# Patient Record
Sex: FEMALE | Race: WHITE | NOT HISPANIC OR LATINO | Employment: UNEMPLOYED | URBAN - METROPOLITAN AREA
[De-identification: names, ages, dates, MRNs, and addresses within clinical notes are randomized per-mention and may not be internally consistent; named-entity substitution may affect disease eponyms.]

---

## 2019-04-04 DIAGNOSIS — M65.841 STENOSING TENOSYNOVITIS OF FINGER OF RIGHT HAND: Primary | ICD-10-CM

## 2019-04-16 ENCOUNTER — CLINICAL SUPPORT (OUTPATIENT)
Dept: REHABILITATION | Facility: HOSPITAL | Age: 39
End: 2019-04-16
Attending: ORTHOPAEDIC SURGERY
Payer: MEDICAID

## 2019-04-16 DIAGNOSIS — M79.89 PAIN AND SWELLING OF RIGHT FOREARM: ICD-10-CM

## 2019-04-16 DIAGNOSIS — M79.621 PAIN IN RIGHT UPPER ARM: ICD-10-CM

## 2019-04-16 DIAGNOSIS — M79.641 PAIN IN RIGHT HAND: ICD-10-CM

## 2019-04-16 DIAGNOSIS — M79.631 PAIN AND SWELLING OF RIGHT FOREARM: ICD-10-CM

## 2019-04-16 PROCEDURE — 97166 OT EVAL MOD COMPLEX 45 MIN: CPT | Mod: PO

## 2019-04-16 NOTE — PLAN OF CARE
[facility] Occupational Therapy  Initial Evaluation     Date: 4/16/2019  Name: Geetha Meyers  Clinic Number: [id number]    Therapy Diagnosis:   Encounter Diagnoses   Name Primary?    Pain in right hand     Pain and swelling of right forearm     Pain in right upper arm      Physician: Triston Pitt III*    Physician Orders: Bilateral wrists ROM, heat, US, multimodality therapies  Medical Diagnosis: M65.841 (ICD-10-CM) - Stenosing tenosynovitis of finger of right hand  Surgical Procedure and Date: N/a  Evaluation Date: 4/16/2019  Insurance Authorization Period Expiration: 4/3/20  Plan of Care Certification Period: 5/10/19  Date of Return to MD: ?    Visit # / Visits authorized: 1/ 1   Time In:2:00PM  Time Out: 3:00PM  Total Billable Time: 60 minutes      Precautions:  Standard    Subjective     Involved Side: RUE  Dominant Side: Right  Date of Onset: A few months ago  History of Current Condition/Mechanism of Injury: Pt reports she was brushing her hair several weeks ago and felt a sharp pain in her wrist and heard a pop. She states the pain shot up her arm almost to the shoulder. She reports she has a history of CTS that is treated conservatively with wrist braces but that she still has occasional numbness, burning, and tingling in her fingers. She wears isotoner edema gloves as needed. She reports she was recently diagnosed with fibromyalgia and is concerned that is part of the issue. She reports recent onset of locking of the index finger of her R hand. She is scheduled for a nerve conduction study later this week.  Imaging: not available  Previous Therapy: No    Past Medical History/Physical Systems Review:   Geetha Meyers  has no past medical history on file.    Geetha Meyers  has no past surgical history on file.    Geetha currently has no medications in their medication list.    Review of patient's allergies indicates:  Allergies not on file     Patient's Goals for Therapy: To be able to  brush her daughter's hair again and get her symptoms to a manageable point.    Pain:  Functional Pain Scale Rating 0-10:   5/10 on average  Location: ulnar side of hand, proximal dorsal forearm, shooting up to upper arm  Description: burning, sometimes dull and achy    Occupation:  Business owner of staffing/security company  Duties: computer work, writing, typing    Functional Limitations/Social History:    Previous functional status includes: Independent with all ADLs.     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 PIP joints of bilateral fingers slip into hyperextension with mild resistance against finger extension.  Palpation: Tenderness over muscle bellies of ECRB and EDC muscles. No tenderness over 2nd A1 pulley.    AROM of bilateral hands and wrists WNL    Treatment     Treatment Time In: 2:40PM  Treatment Time Out: 3:00PM  Total Treatment time separate from Evaluation time:20 minutes    Geetha received the following supervised modalities after being cleared for contradictions for 10 minutes:   -Patient received paraffin bath with MHP to increase blood flow, circulation, pain management and for tissue elasticity prior to therex.     Geetha received therapeutic exercises for 10 minutes including:  -Initial HEP instruction and demonstration    Home Exercise Program/Education:  Issued HEP (see patient instructions in EMR) and educated on modality use for pain management . Exercises were reviewed and Geetha was able to demonstrate them prior to the end of the session.   Pt received a written copy of exercises to perform at home. Geetha demonstrated good  understanding of the education  provided.  Pt was advised to perform these exercises free of pain, and to stop performing them if pain occurs.    Patient/Family Education: role of OT, goals for OT, scheduling/cancellations - pt verbalized understanding. Discussed insurance limitations with patient.    Additional Education provided: journal to record details of type of pain, time of pain, factors that cause pain. Avoid repetitive/sustained gripping    Assessment     Geetha Meyers is a 38 y.o. female referred to outpatient occupational therapy with a medical diagnosis of A1 pulley stenosing tenosynovitis, along with complaints of radiating pain from hand to shoulder and loss of  strength, resulting in Decreased  strength, Decreased functional hand use, Increased pain, Edema and Joint Stiffness and demonstrates limitations as described in the chart below. Following medical record review it is determined that pt will benefit from occupational therapy services in order to maximize pain free and/or functional use of right UE. The following goals were discussed with the patient and patient is in agreement with them as to be addressed in the treatment plan. The patient's rehab potential is Fair.     Anticipated barriers to occupational therapy: underlying cause of symptoms unknown at this time  Pt has no cultural, educational or language barriers to learning provided.    Profile and History Assessment of Occupational Performance Level of Clinical Decision Making Complexity Score   Occupational Profile:   Geetha Meyers is a 38 y.o. female who lives with her family and is a business owner of a [employer]/security company. Geetha Meyers has difficulty with  ADLs and IADLs as listed previously, which  affecting his/her daily functional abilities.      Comorbidities:    has no past medical history on file.    Medical and Therapy History Review:   Brief Performance Deficits    Physical:  Pain, Edema, Joint stiffness, Muscular endurance    Cognitive:  No  deficits    Psychosocial:    No deficits     Clinical Decision Making:  moderate    Assessment Process:  Problem-focused    Modification/Need for Assistance:  Not Necessary    Intervention Selection:  Limited Treatment Options       low  Based on PMHX, co morbidities , data from assessments and functional level of assistance required with task and clinical presentation directly impacting function.       The following goals were discussed with the patient and patient is in agreement with them as to be addressed in the treatment plan.     Goals:   Short Term Goals: (in 2 week)  1) Patient will be independent in HEP and modalities for pain management  2) Pt will identify at least two activities that aggravate pain and two activities that mitigate pain to better manage symptoms  (in  4 weeks)  3) Patient will perform yellow putty gripping for 3 minutes without locking of index finger for pain free gripping during cooking tasks  Long Term Goals: (to be completed by discharge)  4) Pt will write using a pen for at least 5 minutes continuously without dropping pen once to return to previous level of functional performance at work  5) Return to prior level of functioning with  and self care activities.    Plan   Certification Period/Plan of care expiration: 4/16/2019 to 5/10/19    Outpatient Occupational Therapy 1x times weekly for 5 weeks to include the following interventions: paraffin, modalities for pain management, therapeutic exercise, strengthening, ultrasound      Olga Qureshi, OT

## 2019-04-16 NOTE — PATIENT INSTRUCTIONS
Tendon Glides        Start at position A and move through each position slowly attempting to achieve full glide.  A-E is ONE repetition.         Prayer Stretch (Passive Flexor Stretch)      Sitting with elbows on table and palms together, slowly lower wrists toward table until stretch is felt. Be sure to keep palms together throughout stretch. Hold 5-10 seconds. Relax.  .

## 2019-04-30 ENCOUNTER — CLINICAL SUPPORT (OUTPATIENT)
Dept: REHABILITATION | Facility: HOSPITAL | Age: 39
End: 2019-04-30
Attending: ORTHOPAEDIC SURGERY
Payer: MEDICAID

## 2019-04-30 DIAGNOSIS — M79.641 PAIN IN RIGHT HAND: ICD-10-CM

## 2019-04-30 DIAGNOSIS — M79.631 PAIN AND SWELLING OF RIGHT FOREARM: ICD-10-CM

## 2019-04-30 DIAGNOSIS — M79.89 PAIN AND SWELLING OF RIGHT FOREARM: ICD-10-CM

## 2019-04-30 DIAGNOSIS — M79.621 PAIN IN RIGHT UPPER ARM: ICD-10-CM

## 2019-04-30 PROCEDURE — 97530 THERAPEUTIC ACTIVITIES: CPT | Mod: PO

## 2019-04-30 NOTE — PROGRESS NOTES
Occupational Therapy Daily Treatment Note     Date: 4/30/2019  Name: Geetha Meyers  Clinic Number: [id number]    Therapy Diagnosis:   Encounter Diagnoses   Name Primary?    Pain in right hand     Pain and swelling of right forearm     Pain in right upper arm      Physician: Triston Pitt III*    Physician Orders: Bilateral wrists ROM, heat, US, multimodality therapies  Medical Diagnosis: M65.841 (ICD-10-CM) - Stenosing tenosynovitis of finger of right hand  Surgical Procedure and Date: N/a  Evaluation Date: 4/16/2019  Insurance Authorization Period Expiration: 6/29/19  Plan of Care Certification Period: 5/10/19  Date of Return to MD: ?     Visit # / Visits authorized: 2/ 8   Time In:9:30AM  Time Out: 10:20AMPM  Total Billable Time: 50 minutes    Subjective     Patient reports compliance with HEP.    Pain:  Functional Pain Scale Rating 0-10:   5/10 on average  Location: ulnar side of hand, proximal dorsal forearm, shooting up to upper arm  Description: burning, sometimes dull and achy     Objective     Geetha received the following supervised modalities after being cleared for contradictions for 10 minutes:   -Patient received paraffin bath to bilateral hands to increase blood flow, circulation, pain management and for tissue elasticity prior to therex.     Geetha received therapeutic exercises for 40 minutes including:  -Joint blocking all digits, PIP and DIP x 10 each  -Grasping and extension in rice for desensitization and light resistance x 3'  -Grasp and transfer rice x 3'  -Yellow putty molding x 2'  -Yellow putty gripping x 2'2  Fitted pt with tubigrip size D to bilateral wrist/hands  Fitted pt with oval-8 splints to bilateral ring fingers    Home Exercises and Education Provided     Education provided: tubigrip sleeve use for swelling, use of oval-8 splints during the day, issued putty and instructed on performing  strengthening once per day 3-4 minutes    Written Home Exercises Provided:  Yes  Exercises were reviewed and Geetha was able to demonstrate them prior to the end of the session.  Geetha demonstrated good  understanding of the HEP provided.   .   See EMR under Patient Instructions for exercises provided during initial evaluation.     Assessment     Pt reports dexterity of the fingers slightly improved since performing daily exercises. She tolerated light  strengthening with mild c/o pain and reported fatigue by end. Pt fitted with oval-8 splints to bilateral ring fingers to limit hyperextension of the PIPs, decrease pain, and promote function.    Geetha is progressing well towards her goals and there are no updates to goals at this time. Pt prognosis is Fair.     Pt will continue to benefit from skilled outpatient occupational therapy to address the deficits listed in the problem list on initial evaluation provide pt/family education and to maximize pt's level of independence in the home and community environment.     Pt's spiritual, cultural and educational needs considered and pt agreeable to plan of care and goals.    Goals:  Short Term Goals: (in 2 week)  1) Patient will be independent in HEP and modalities for pain management-Progressing  2) Pt will identify at least two activities that aggravate pain and two activities that mitigate pain to better manage symptoms-Progressing  (in  4 weeks)  3) Patient will perform yellow putty gripping for 3 minutes without locking of index finger for pain free gripping during cooking tasks-Progressing  Long Term Goals: (to be completed by discharge)  4) Pt will write using a pen for at least 5 minutes continuously without dropping pen once to return to previous level of functional performance at work-Not met  5) Return to prior level of functioning with  and self care activities-Not met.      Plan   Continue OT 1x/week  Updates/Grading for next session: assess tolerance to oval-8 splints and daily  strengthening      Olga Qureshi, OT

## 2019-05-09 ENCOUNTER — CLINICAL SUPPORT (OUTPATIENT)
Dept: REHABILITATION | Facility: HOSPITAL | Age: 39
End: 2019-05-09
Attending: ORTHOPAEDIC SURGERY
Payer: MEDICAID

## 2019-05-09 DIAGNOSIS — M79.89 PAIN AND SWELLING OF RIGHT FOREARM: ICD-10-CM

## 2019-05-09 DIAGNOSIS — M79.641 PAIN IN RIGHT HAND: ICD-10-CM

## 2019-05-09 DIAGNOSIS — M79.631 PAIN AND SWELLING OF RIGHT FOREARM: ICD-10-CM

## 2019-05-09 DIAGNOSIS — M79.621 PAIN IN RIGHT UPPER ARM: ICD-10-CM

## 2019-05-09 PROCEDURE — 97530 THERAPEUTIC ACTIVITIES: CPT | Mod: PO

## 2019-05-09 PROCEDURE — 97018 PARAFFIN BATH THERAPY: CPT | Mod: PO

## 2019-05-09 PROCEDURE — 97110 THERAPEUTIC EXERCISES: CPT | Mod: PO

## 2019-05-09 NOTE — PROGRESS NOTES
Occupational Therapy Daily Treatment Note     Date: 5/9/2019  Name: Geetha Meyers  Clinic Number: [id number]    Therapy Diagnosis:   Encounter Diagnoses   Name Primary?    Pain in right hand     Pain and swelling of right forearm     Pain in right upper arm      Physician: Triston Pitt III*    Physician Orders: Bilateral wrists ROM, heat, US, multimodality therapies  Medical Diagnosis: M65.841 (ICD-10-CM) - Stenosing tenosynovitis of finger of right hand  Surgical Procedure and Date: N/a  Evaluation Date: 4/16/2019  Insurance Authorization Period Expiration: 6/29/19  Plan of Care Certification Period: 5/10/19  Date of Return to MD: ?     Visit # / Visits authorized: 3/ 8   Time In:9:30AM  Time Out: 10:20AM  Total Billable Time: 50 minutes    Subjective     Patient reports the compression sleeves and oval 8 splints have been helping with the pain.    Pain:  Functional Pain Scale Rating 0-10:   5/10 on average  Location: ulnar side of hand, proximal dorsal forearm, shooting up to upper arm  Description: burning, sometimes dull and achy     Objective     Geetha received the following supervised modalities after being cleared for contradictions for 15 minutes:   -Patient received paraffin bath to bilateral hands to increase blood flow, circulation, pain management and for tissue elasticity prior to therex.     Geetha received therapeutic exercises for 35 minutes including:  -Joint blocking all digits, PIP and DIP x 10 each, each hand  -Grasping and extension in rice for desensitization and light resistance x 3' each hand  -Yellow putty molding x 2', each hand  -Yellow putty gripping x 2', each hand    Home Exercises and Education Provided     Education provided: Reviewed HEP    Written Home Exercises Provided: Yes  Exercises were reviewed and Geetha was able to demonstrate them prior to the end of the session.  Geetha demonstrated good  understanding of the HEP provided.   .   See EMR under Patient Instructions for  exercises provided during initial evaluation.     Assessment     Pt reports dexterity of the fingers slightly improved since performing daily exercises. She tolerated light  strengthening with mild c/o pain and reported fatigue by end. Pt fitted with oval-8 splints to bilateral ring fingers to limit hyperextension of the PIPs, decrease pain, and promote function.    Geetha is progressing well towards her goals and there are no updates to goals at this time. Pt prognosis is Fair.     Pt will continue to benefit from skilled outpatient occupational therapy to address the deficits listed in the problem list on initial evaluation provide pt/family education and to maximize pt's level of independence in the home and community environment.     Pt's spiritual, cultural and educational needs considered and pt agreeable to plan of care and goals.    Goals:  Short Term Goals: (in 2 week)  1) Patient will be independent in HEP and modalities for pain management-Met  2) Pt will identify at least two activities that aggravate pain and two activities that mitigate pain to better manage symptoms-Met 1/2  (in  4 weeks)  3) Patient will perform yellow putty gripping for 3 minutes without locking of index finger for pain free gripping during cooking tasks-Progressing  Long Term Goals: (to be completed by discharge)  4) Pt will write using a pen for at least 5 minutes continuously without dropping pen once to return to previous level of functional performance at work-Not met  5) Return to prior level of functioning with  and self care activities-Not met.      Plan   Continue OT 1x/week  Updates/Grading for next session: assess tolerance to oval-8 splints and daily  strengthening      Olga Qureshi, OT

## 2019-05-31 ENCOUNTER — DOCUMENTATION ONLY (OUTPATIENT)
Dept: REHABILITATION | Facility: HOSPITAL | Age: 39
End: 2019-05-31

## 2019-05-31 NOTE — PROGRESS NOTES
OCCUPATIONAL THERAPY RETURN TO DOCTOR  DISCHARGE SUMMARY      Patient: Geetha Meyers  MRN: [id number]  Date of Evaluation: 4/16/19  Referring Physician: Triston Pitt III MD visit: Not reported  Primary Diagnosis: M65.841 (ICD-10-CM) - Stenosing tenosynovitis of finger of right hand  Treatment Diagnosis:   Encounter Diagnoses   Name Primary?    Pain in right hand      Pain and swelling of right forearm      Pain in right upper arm      Date of last visit: 5/9/19    DOS:N/a  Certification Period:  4/16/19 to 5/10/19  Precautions:  Standard    TOTAL VISITS: 3    Assessment      Pt reports dexterity of the fingers slightly improved since initiating HEP. She reports decreased triggering of the fingers during daily activities with use of oval-8 splints.     Goals:  Short Term Goals: (in 2 week)  1) Patient will be independent in HEP and modalities for pain management-Met  2) Pt will identify at least two activities that aggravate pain and two activities that mitigate pain to better manage symptoms-Met   (in  4 weeks)  3) Patient will perform yellow putty gripping for 3 minutes without locking of index finger for pain free gripping during cooking tasks-Met  Long Term Goals: (to be completed by discharge)  4) Pt will write using a pen for at least 5 minutes continuously without dropping pen once to return to previous level of functional performance at work-Not met  5) Return to prior level of functioning with  and self care activities-Not met.    Reason for discharge from therapy: Trigger finger is asymptomatic with splint use. Pain managed with HEP and modalities.    Plan   Discharge to HEP    TRINY Shen

## 2019-06-03 DIAGNOSIS — M65.841 STENOSING TENOSYNOVITIS OF FINGER OF RIGHT HAND: Primary | ICD-10-CM

## 2019-10-21 DIAGNOSIS — R05.9 COUGH: Primary | ICD-10-CM

## 2019-10-24 ENCOUNTER — HOSPITAL ENCOUNTER (OUTPATIENT)
Dept: PULMONOLOGY | Facility: CLINIC | Age: 39
Discharge: HOME | End: 2019-10-24
Payer: MEDICAID

## 2019-10-24 ENCOUNTER — HOSPITAL ENCOUNTER (OUTPATIENT)
Dept: RADIOLOGY | Facility: HOSPITAL | Age: 39
Discharge: HOME | End: 2019-10-24
Attending: NURSE PRACTITIONER
Payer: MEDICAID

## 2019-10-24 ENCOUNTER — OFFICE VISIT (OUTPATIENT)
Dept: PULMONOLOGY | Facility: CLINIC | Age: 39
End: 2019-10-24
Payer: MEDICAID

## 2019-10-24 VITALS
OXYGEN SATURATION: 98 % | HEIGHT: 63 IN | DIASTOLIC BLOOD PRESSURE: 82 MMHG | BODY MASS INDEX: 51.29 KG/M2 | WEIGHT: 289.44 LBS | SYSTOLIC BLOOD PRESSURE: 124 MMHG | HEART RATE: 111 BPM

## 2019-10-24 DIAGNOSIS — R05.9 COUGH: ICD-10-CM

## 2019-10-24 DIAGNOSIS — R06.02 SHORTNESS OF BREATH: Primary | ICD-10-CM

## 2019-10-24 DIAGNOSIS — R06.02 SHORTNESS OF BREATH: ICD-10-CM

## 2019-10-24 LAB
DLCO ADJ PRE: 16.96 ML/(MIN*MMHG) (ref 19.61–31.07)
DLCO SINGLE BREATH LLN: 19.61
DLCO SINGLE BREATH PRE REF: 66.9 %
DLCO SINGLE BREATH REF: 25.34
DLCOC SBVA LLN: 3.68
DLCOC SBVA PRE REF: 88.3 %
DLCOC SBVA REF: 5.31
DLCOC SINGLE BREATH LLN: 19.61
DLCOC SINGLE BREATH PRE REF: 66.9 %
DLCOC SINGLE BREATH REF: 25.34
DLCOCSBVAULN: 6.94
DLCOCSINGLEBREATHULN: 31.07
DLCOSINGLEBREATHULN: 31.07
DLCOVA LLN: 3.68
DLCOVA PRE REF: 88.3 %
DLCOVA PRE: 4.69 ML/(MIN*MMHG*L) (ref 3.68–6.94)
DLCOVA REF: 5.31
DLCOVAULN: 6.94
DLVAADJ PRE: 4.69 ML/(MIN*MMHG*L) (ref 3.68–6.94)
FEF 25 75 LLN: 1.54
FEF 25 75 PRE REF: 62.9 %
FEF 25 75 REF: 2.81
FEV05 LLN: 1.25
FEV05 REF: 2.1
FEV1 FVC LLN: 73
FEV1 FVC PRE REF: 93.5 %
FEV1 FVC REF: 83
FEV1 LLN: 1.98
FEV1 PRE REF: 78.4 %
FEV1 REF: 2.55
FVC LLN: 2.4
FVC PRE REF: 83.5 %
FVC REF: 3.07
IVC PRE: 2.35 L (ref 2.4–3.73)
IVC SINGLE BREATH LLN: 2.4
IVC SINGLE BREATH PRE REF: 76.6 %
IVC SINGLE BREATH REF: 3.07
IVCSINGLEBREATHULN: 3.73
MVV LLN: 94
MVV REF: 111
PEF LLN: 4.56
PEF PRE REF: 90 %
PEF REF: 6.51
PRE DLCO: 16.96 ML/(MIN*MMHG) (ref 19.61–31.07)
PRE FEF 25 75: 1.77 L/S (ref 1.54–4.08)
PRE FET 100: 6.68 SEC
PRE FEV05 REF: 74.2 %
PRE FEV1 FVC: 77.98 % (ref 72.57–94.19)
PRE FEV1: 2 L (ref 1.98–3.12)
PRE FEV5: 1.56 L (ref 1.25–2.96)
PRE FVC: 2.56 L (ref 2.4–3.73)
PRE PEF: 5.86 L/S (ref 4.56–8.45)
VA PRE: 3.61 L (ref 4.62–4.62)
VA SINGLE BREATH LLN: 4.62
VA SINGLE BREATH PRE REF: 78.2 %
VA SINGLE BREATH REF: 4.62
VASINGLEBREATHULN: 4.62

## 2019-10-24 PROCEDURE — 99999 PR PBB SHADOW E&M-EST. PATIENT-LVL III: CPT | Mod: PBBFAC,,, | Performed by: NURSE PRACTITIONER

## 2019-10-24 PROCEDURE — 94729 DIFFUSING CAPACITY: CPT | Mod: 26,S$PBB,, | Performed by: INTERNAL MEDICINE

## 2019-10-24 PROCEDURE — 71046 XR CHEST PA AND LATERAL: ICD-10-PCS | Mod: 26,,, | Performed by: RADIOLOGY

## 2019-10-24 PROCEDURE — 94010 BREATHING CAPACITY TEST: ICD-10-PCS | Mod: 26,S$PBB,, | Performed by: INTERNAL MEDICINE

## 2019-10-24 PROCEDURE — 94010 BREATHING CAPACITY TEST: CPT | Mod: 26,S$PBB,, | Performed by: INTERNAL MEDICINE

## 2019-10-24 PROCEDURE — 94729 DIFFUSING CAPACITY: CPT | Mod: PBBFAC | Performed by: INTERNAL MEDICINE

## 2019-10-24 PROCEDURE — 94729 PR C02/MEMBANE DIFFUSE CAPACITY: ICD-10-PCS | Mod: 26,S$PBB,, | Performed by: INTERNAL MEDICINE

## 2019-10-24 PROCEDURE — 99213 OFFICE O/P EST LOW 20 MIN: CPT | Mod: PBBFAC,25 | Performed by: NURSE PRACTITIONER

## 2019-10-24 PROCEDURE — 99999 PR PBB SHADOW E&M-EST. PATIENT-LVL III: ICD-10-PCS | Mod: PBBFAC,,, | Performed by: NURSE PRACTITIONER

## 2019-10-24 PROCEDURE — 99204 OFFICE O/P NEW MOD 45 MIN: CPT | Mod: 25,S$PBB,, | Performed by: NURSE PRACTITIONER

## 2019-10-24 PROCEDURE — 71046 X-RAY EXAM CHEST 2 VIEWS: CPT | Mod: 26,,, | Performed by: RADIOLOGY

## 2019-10-24 PROCEDURE — 99204 PR OFFICE/OUTPT VISIT, NEW, LEVL IV, 45-59 MIN: ICD-10-PCS | Mod: 25,S$PBB,, | Performed by: NURSE PRACTITIONER

## 2019-10-24 PROCEDURE — 71046 X-RAY EXAM CHEST 2 VIEWS: CPT | Mod: TC,FY

## 2019-10-24 PROCEDURE — 94010 BREATHING CAPACITY TEST: CPT | Mod: PBBFAC | Performed by: INTERNAL MEDICINE

## 2019-10-24 RX ORDER — CYCLOBENZAPRINE HCL 10 MG
TABLET ORAL
Status: ON HOLD | COMMUNITY
Start: 2019-09-23 | End: 2021-03-19

## 2019-10-24 RX ORDER — NAPROXEN 250 MG/1
250 TABLET ORAL
COMMUNITY
End: 2020-03-05

## 2019-10-24 RX ORDER — BENZONATATE 200 MG/1
CAPSULE ORAL
COMMUNITY
Start: 2019-08-23 | End: 2020-03-05

## 2019-10-24 RX ORDER — MULTIVITAMIN
1 TABLET ORAL
Status: ON HOLD | COMMUNITY
End: 2021-03-19

## 2019-10-24 RX ORDER — LORATADINE 10 MG/1
10 TABLET ORAL
COMMUNITY
End: 2020-03-05

## 2019-10-24 RX ORDER — OMEPRAZOLE 40 MG/1
CAPSULE, DELAYED RELEASE ORAL
COMMUNITY
Start: 2005-08-01 | End: 2020-03-05

## 2019-10-24 RX ORDER — CYCLOBENZAPRINE HCL 10 MG
10 TABLET ORAL NIGHTLY
COMMUNITY
Start: 2009-01-01 | End: 2023-08-10 | Stop reason: SDUPTHER

## 2019-10-24 RX ORDER — LORATADINE 10 MG/1
TABLET ORAL
COMMUNITY
Start: 2019-09-23 | End: 2020-03-05

## 2019-10-24 RX ORDER — NAPROXEN SODIUM 500 MG/1
TABLET, FILM COATED, EXTENDED RELEASE ORAL
COMMUNITY
Start: 2009-01-01 | End: 2020-03-05

## 2019-10-24 RX ORDER — NAPROXEN 500 MG/1
TABLET ORAL
Status: ON HOLD | COMMUNITY
Start: 2019-09-30 | End: 2021-03-19

## 2019-10-24 RX ORDER — ERGOCALCIFEROL (VITAMIN D2) 10 MCG
TABLET ORAL
COMMUNITY
End: 2020-03-05

## 2019-10-24 RX ORDER — LEVALBUTEROL TARTRATE 45 UG/1
AEROSOL, METERED ORAL
COMMUNITY
Start: 2019-08-23 | End: 2020-03-05 | Stop reason: SDUPTHER

## 2019-10-24 NOTE — LETTER
October 25, 2019      Anel Mace MD  [address] [place] [address]           [facility]  [address]  [place] [address]  Phone: [phone]          Patient: Geetha Meyers   MR Number: [id number]   YOB: 1980   Date of Visit: 10/24/2019       Dear Dr. Anel Mace:    Thank you for referring Geetha Meyers to me for evaluation. Attached you will find relevant portions of my assessment and plan of care.    If you have questions, please do not hesitate to call me. I look forward to following Geetha Meyers along with you.    Sincerely,    Saritha Aguirre, [facility]    Enclosure  CC:  No Recipients    If you would like to receive this communication electronically, please contact [email or web address] or [phone] to request more information on [facility] [employer] access.    For providers and/or their staff who would like to refer a patient to [facility], please contact us through our one-stop-shop provider referral line, [facility] , at [phone].    If you feel you have received this communication in error or would no longer like to receive these types of communications, please e-mail [email or web address]

## 2019-10-24 NOTE — PROGRESS NOTES
.   Subjective:       Patient ID: Geetha Meyers is a 39 y.o. female.    Chief Complaint: Cough    HPI:   Geetha Meyers is a 39 y.o. female who presents with Cough for several months.  Can feel like she needs to cough something up but nothing is coming up.  When she coughs she feels something there. Tessalon not helping, started with a URI a few months ago.  Tried albuterol, a neb and tessalon.  Tried flonase for years but not currently using.  Was having nose bleeds.  No tobacco use.   As a child.Had to have allergy shots.  Had steroids which help for a bit but stop helping after a while.  Had hives on her face and back after a steroid shot. Does have a mild allergy and did a mold test in her apartment that showed black mold which was fixed.  She got better for a while but then got worse again.  Had to have a bone graft in [id number] to assist with completion of a dental procedure.  Was thought to have RA previously and was diagnosed with fibromyalgia.        Review of Systems   Constitutional: Positive for fever, chills and night sweats. Negative for activity change and fatigue.   HENT: Negative for postnasal drip, rhinorrhea, trouble swallowing and congestion.    Eyes: Negative for itching.   Respiratory: Positive for cough. Negative for hemoptysis, sputum production, choking, chest tightness, shortness of breath, wheezing, dyspnea on extertion and use of rescue inhaler.    Cardiovascular: Negative for chest pain and palpitations.   Genitourinary: Negative for difficulty urinating.   Endocrine: Negative for cold intolerance and heat intolerance.    Musculoskeletal: Negative for arthralgias.   Skin: Negative for rash.   Gastrointestinal: Negative for nausea, vomiting and acid reflux.   Neurological: Negative for dizziness and light-headedness.   Hematological: Negative for adenopathy.   Psychiatric/Behavioral: Negative for sleep disturbance.         Social History     Tobacco Use    Smoking status: Never Smoker    Substance Use Topics    Alcohol use: Yes     Frequency: Monthly or less     Drinks per session: 3 or 4     Binge frequency: Never       Review of patient's allergies indicates:   Allergen Reactions    Mold Diarrhea, Hives, Itching, Nausea And Vomiting, Rash, Shortness Of Breath and Swelling    Mushroom Anaphylaxis, Diarrhea, Edema, Hives, Itching, Nausea And Vomiting, Rash and Swelling    Wool Hives, Itching, Nausea Only, Rash and Swelling     History reviewed. No pertinent past medical history.  History reviewed. No pertinent surgical history.  Current Outpatient Medications on File Prior to Visit   Medication Sig    benzonatate (TESSALON) 200 MG capsule     cyclobenzaprine (FLEXERIL) 10 MG tablet Take 10 mg by mouth.    cyclobenzaprine (FLEXERIL) 10 MG tablet     ergocalciferol, vitamin D2, 400 unit Tab Take by mouth.    ketone blood test (PRECISION XTRA B-KETONE) Strp     levalbuterol (XOPENEX HFA) 45 mcg/actuation inhaler     loratadine (CLARITIN) 10 mg tablet Take 10 mg by mouth.    loratadine (CLARITIN) 10 mg tablet     loratadine 10 mg Cap     multivitamin (THERAGRAN) per tablet Take 1 tablet by mouth.    naproxen (NAPROSYN) 250 MG tablet Take 250 mg by mouth.    naproxen (NAPROSYN) 500 MG tablet     naproxen sodium 500 mg TM24     omeprazole (PRILOSEC) 40 MG capsule      No current facility-administered medications on file prior to visit.        Objective:      Vitals:    10/24/19 0908   BP: 124/82   Pulse: (!) 111     Physical Exam   Constitutional: She is oriented to person, place, and time. She appears well-developed and well-nourished. No distress.   HENT:   Head: Normocephalic.   Neck: Normal range of motion. Neck supple.   Cardiovascular: Normal rate and regular rhythm.   No murmur heard.  Pulmonary/Chest: Normal expansion, symmetric chest wall expansion, effort normal and breath sounds normal. No respiratory distress. She has no decreased breath sounds. She has no wheezes. She has  no rhonchi. She has no rales.   Abdominal: Soft. She exhibits no distension. There is no hepatosplenomegaly. There is no tenderness.   Musculoskeletal: Normal range of motion. She exhibits no edema.   Lymphadenopathy:     She has no cervical adenopathy.   Neurological: She is alert and oriented to person, place, and time. Gait normal.   Skin: Skin is warm and dry. No cyanosis. Nails show no clubbing.   Psychiatric: She has a normal mood and affect.   Vitals reviewed.    Personal Diagnostic Review    Imaging personally reviewed with patient CXR 10/24/19  PFTs personally reviewed and discussed with patient          Assessment:     Problem List Items Addressed This Visit        Pulmonary    Cough    Overview     Ongoing for several months.  Has increased allergic reactions to mold and was exposed to mold in her home during the course of illness. Mold has been remediated but symptoms remain.         Current Assessment & Plan     PFTs show no obstruction or restriction save for a  diffusion capacity.  She is not anemic.  Unable to tell from chart review if she has had a MCTD workup.  Will initiate if not completed.  Trial albuterol and Trial saline nasal rinses, oral antihistamine, Flonase nasal spray and Delsym cough syrup BID as outlined in AVS.            Other Visit Diagnoses     Shortness of breath    -  Primary    Relevant Orders    IGE (Completed)    CBC auto differential (Completed)    X-Ray Chest PA And Lateral (Completed)

## 2019-10-25 NOTE — ASSESSMENT & PLAN NOTE
PFTs show no obstruction or restriction save for a  diffusion capacity.  She is not anemic.  Unable to tell from chart review if she has had a MCTD workup.  Will initiate if not completed.  Trial albuterol and Trial saline nasal rinses, oral antihistamine, Flonase nasal spray and Delsym cough syrup BID as outlined in AVS.

## 2020-02-03 ENCOUNTER — TELEPHONE (OUTPATIENT)
Dept: PULMONOLOGY | Facility: CLINIC | Age: 40
End: 2020-02-03

## 2020-02-03 NOTE — TELEPHONE ENCOUNTER
Spoke with patient, informed her that I have received her message. Patient follow up appointment with NICHOLAS Melara has been scheduled on 3/5/2020 for 2:30 pm. Patient verbalized that she understand and excepted the appointment.

## 2020-02-03 NOTE — TELEPHONE ENCOUNTER
----- Message from Alaina Wood sent at 2/3/2020  1:17 PM CST -----  Contact: patient  Please call above patient received letter to schedule appointment call [phone] thanks.

## 2020-03-05 ENCOUNTER — OFFICE VISIT (OUTPATIENT)
Dept: PULMONOLOGY | Facility: CLINIC | Age: 40
End: 2020-03-05
Payer: COMMERCIAL

## 2020-03-05 VITALS — WEIGHT: 282.19 LBS | BODY MASS INDEX: 50 KG/M2 | OXYGEN SATURATION: 95 % | HEIGHT: 63 IN | HEART RATE: 88 BPM

## 2020-03-05 DIAGNOSIS — R06.83 SNORING: Primary | ICD-10-CM

## 2020-03-05 DIAGNOSIS — R05.9 COUGH: ICD-10-CM

## 2020-03-05 PROCEDURE — 99999 PR PBB SHADOW E&M-EST. PATIENT-LVL III: ICD-10-PCS | Mod: PBBFAC,,, | Performed by: NURSE PRACTITIONER

## 2020-03-05 PROCEDURE — 3008F BODY MASS INDEX DOCD: CPT | Mod: CPTII,S$GLB,, | Performed by: NURSE PRACTITIONER

## 2020-03-05 PROCEDURE — 99999 PR PBB SHADOW E&M-EST. PATIENT-LVL III: CPT | Mod: PBBFAC,,, | Performed by: NURSE PRACTITIONER

## 2020-03-05 PROCEDURE — 99213 PR OFFICE/OUTPT VISIT, EST, LEVL III, 20-29 MIN: ICD-10-PCS | Mod: S$GLB,,, | Performed by: NURSE PRACTITIONER

## 2020-03-05 PROCEDURE — 3008F PR BODY MASS INDEX (BMI) DOCUMENTED: ICD-10-PCS | Mod: CPTII,S$GLB,, | Performed by: NURSE PRACTITIONER

## 2020-03-05 PROCEDURE — 99213 OFFICE O/P EST LOW 20 MIN: CPT | Mod: S$GLB,,, | Performed by: NURSE PRACTITIONER

## 2020-03-05 RX ORDER — LEVALBUTEROL TARTRATE 45 UG/1
1-2 AEROSOL, METERED ORAL EVERY 6 HOURS PRN
Qty: 15 G | Refills: 11 | Status: SHIPPED | OUTPATIENT
Start: 2020-03-05 | End: 2023-03-14

## 2020-03-05 RX ORDER — GABAPENTIN 100 MG/1
1 CAPSULE ORAL DAILY
Status: ON HOLD | COMMUNITY
Start: 2020-02-18 | End: 2021-03-19

## 2020-03-05 RX ORDER — ERGOCALCIFEROL 1.25 MG/1
50000 CAPSULE ORAL WEEKLY
COMMUNITY
Start: 2018-05-14 | End: 2020-04-01

## 2020-03-05 RX ORDER — LEVOCETIRIZINE DIHYDROCHLORIDE 5 MG/1
5 TABLET, FILM COATED ORAL DAILY PRN
COMMUNITY

## 2020-03-05 NOTE — PROGRESS NOTES
Subjective:       Patient ID: Geetha Meyers is a 39 y.o. female.    Chief Complaint: Shortness of Breath and Cough    HPI:   Geetha Meyers is a 39 y.o. female who presents for follow up.   Shortness of breath is better  Cough is better  Likes inhaler  Having snoring and daytime somnolence  Her  reports that she snores and has pauses in her breathing  Has established with rheum, waiting for records and then they will initiate a formal work up    Review of Systems   Constitutional: Negative for fever, chills, fatigue and night sweats.   HENT: Negative for postnasal drip and congestion.    Respiratory: Negative for sputum production, chest tightness, wheezing, dyspnea on extertion and use of rescue inhaler.    Cardiovascular: Negative for leg swelling.   Psychiatric/Behavioral: Negative for sleep disturbance.         Social History     Tobacco Use    Smoking status: Never Smoker    Smokeless tobacco: Never Used   Substance Use Topics    Alcohol use: Yes     Frequency: Monthly or less     Drinks per session: 3 or 4     Binge frequency: Never       Review of patient's allergies indicates:   Allergen Reactions    Mold Diarrhea, Hives, Itching, Nausea And Vomiting, Rash, Shortness Of Breath and Swelling    Mushroom Anaphylaxis, Diarrhea, Edema, Hives, Itching, Nausea And Vomiting, Rash and Swelling    Wool Hives, Itching, Nausea Only, Rash and Swelling     History reviewed. No pertinent past medical history.  History reviewed. No pertinent surgical history.  Current Outpatient Medications on File Prior to Visit   Medication Sig    cyclobenzaprine (FLEXERIL) 10 MG tablet Take 10 mg by mouth.    ergocalciferol (ERGOCALCIFEROL) 50,000 unit Cap Take 50,000 Units by mouth once a week.    gabapentin (NEURONTIN) 100 MG capsule 1 capsule once daily.    levocetirizine (XYZAL) 5 MG tablet Take 5 mg by mouth once daily.    cyclobenzaprine (FLEXERIL) 10 MG tablet     ketone blood test (PRECISION XTRA B-KETONE) Strp      levalbuterol (XOPENEX HFA) 45 mcg/actuation inhaler     multivitamin (THERAGRAN) per tablet Take 1 tablet by mouth.    naproxen (NAPROSYN) 500 MG tablet     [DISCONTINUED] benzonatate (TESSALON) 200 MG capsule     [DISCONTINUED] ergocalciferol, vitamin D2, 400 unit Tab Take by mouth.    [DISCONTINUED] loratadine (CLARITIN) 10 mg tablet Take 10 mg by mouth.    [DISCONTINUED] loratadine (CLARITIN) 10 mg tablet     [DISCONTINUED] loratadine 10 mg Cap     [DISCONTINUED] naproxen (NAPROSYN) 250 MG tablet Take 250 mg by mouth.    [DISCONTINUED] naproxen sodium 500 mg TM24     [DISCONTINUED] omeprazole (PRILOSEC) 40 MG capsule      No current facility-administered medications on file prior to visit.        Objective:      Vitals:    03/05/20 1445   Pulse: 88     Physical Exam   Constitutional: She is oriented to person, place, and time. She appears well-developed and well-nourished. No distress.   HENT:   Head: Normocephalic.   Neck: Normal range of motion. Neck supple.   Cardiovascular: Normal rate.   Pulmonary/Chest: Normal expansion. No respiratory distress. She has no decreased breath sounds. She has no wheezes. She has no rales.   Abdominal: Soft.   Musculoskeletal: Normal range of motion. She exhibits no edema.   Lymphadenopathy:     She has no axillary adenopathy.   Neurological: She is alert and oriented to person, place, and time. Gait normal.   Skin: Skin is warm and dry. She is not diaphoretic. No erythema. Nails show no clubbing.   Psychiatric: She has a normal mood and affect.   Nursing note and vitals reviewed.    Personal Diagnostic Review    Pulmonary Function Tests 3/5/2020   SpO2 95   Height 63.000   Weight 4515.02   BMI (Calculated) 50   Some recent data might be hidden         Assessment:     Problem List Items Addressed This Visit        Pulmonary    Cough    Overview     Continue saline nasal rinses, oral antihistamine, Flonase nasal spray and Delsym cough syrup BID as outlined in  AVS.           Current Assessment & Plan     Feels much improved.           Relevant Medications    levalbuterol (XOPENEX HFA) 45 mcg/actuation inhaler       Other    Snoring - Primary    Overview     Refer to sleep medicine for eval.  Suspect this contributes to her daytime somnolence         Current Assessment & Plan     Await eval by sleep         Relevant Orders    Ambulatory referral/consult to Sleep Disorders

## 2020-03-05 NOTE — PATIENT INSTRUCTIONS
Keep up the good work!  Continue the saline rinses and other meds as now    I'll be watching for what sleep medicine has to say    Keep me posted on how you are doing!

## 2020-03-13 ENCOUNTER — PATIENT MESSAGE (OUTPATIENT)
Dept: PULMONOLOGY | Facility: CLINIC | Age: 40
End: 2020-03-13

## 2021-03-16 ENCOUNTER — HOSPITAL ENCOUNTER (INPATIENT)
Facility: HOSPITAL | Age: 41
LOS: 4 days | Discharge: HOME | DRG: 098 | End: 2021-03-20
Attending: EMERGENCY MEDICINE | Admitting: HOSPITALIST
Payer: MEDICAID

## 2021-03-16 DIAGNOSIS — R50.9 FEVER: ICD-10-CM

## 2021-03-16 DIAGNOSIS — G03.0 ASEPTIC MENINGITIS DUE TO DRUG: Primary | ICD-10-CM

## 2021-03-16 DIAGNOSIS — M79.10 MYALGIA: ICD-10-CM

## 2021-03-16 DIAGNOSIS — T50.905A ASEPTIC MENINGITIS DUE TO DRUG: Primary | ICD-10-CM

## 2021-03-16 DIAGNOSIS — G03.9 MENINGITIS: ICD-10-CM

## 2021-03-16 DIAGNOSIS — Z79.899 IMMUNOSUPPRESSION DUE TO DRUG THERAPY: ICD-10-CM

## 2021-03-16 DIAGNOSIS — D84.821 IMMUNOSUPPRESSION DUE TO DRUG THERAPY: ICD-10-CM

## 2021-03-16 DIAGNOSIS — R51.9 INTRACTABLE HEADACHE, UNSPECIFIED CHRONICITY PATTERN, UNSPECIFIED HEADACHE TYPE: ICD-10-CM

## 2021-03-16 LAB
BACTERIA #/AREA URNS AUTO: ABNORMAL /HPF
BASOPHILS # BLD AUTO: 0.05 K/UL (ref 0–0.2)
BASOPHILS NFR BLD: 0.5 % (ref 0–1.9)
BILIRUB UR QL STRIP: NEGATIVE
CLARITY UR REFRACT.AUTO: CLEAR
COLOR UR AUTO: YELLOW
CRP SERPL-MCNC: 11.2 MG/L (ref 0–8.2)
CTP QC/QA: YES
CTP QC/QA: YES
DIFFERENTIAL METHOD: ABNORMAL
EOSINOPHIL # BLD AUTO: 0 K/UL (ref 0–0.5)
EOSINOPHIL NFR BLD: 0.1 % (ref 0–8)
ERYTHROCYTE [DISTWIDTH] IN BLOOD BY AUTOMATED COUNT: 14.3 % (ref 11.5–14.5)
ERYTHROCYTE [SEDIMENTATION RATE] IN BLOOD BY WESTERGREN METHOD: 75 MM/HR (ref 0–36)
GLUCOSE UR QL STRIP: NEGATIVE
GROUP A STREP, MOLECULAR: NEGATIVE
HCT VFR BLD AUTO: 38.9 % (ref 37–48.5)
HGB BLD-MCNC: 12.2 G/DL (ref 12–16)
HGB UR QL STRIP: ABNORMAL
IMM GRANULOCYTES # BLD AUTO: 0.18 K/UL (ref 0–0.04)
IMM GRANULOCYTES NFR BLD AUTO: 1.7 % (ref 0–0.5)
KETONES UR QL STRIP: NEGATIVE
LACTATE SERPL-SCNC: 1.7 MMOL/L (ref 0.5–2.2)
LEUKOCYTE ESTERASE UR QL STRIP: NEGATIVE
LYMPHOCYTES # BLD AUTO: 3.1 K/UL (ref 1–4.8)
LYMPHOCYTES NFR BLD: 29 % (ref 18–48)
MCH RBC QN AUTO: 28.9 PG (ref 27–31)
MCHC RBC AUTO-ENTMCNC: 31.4 G/DL (ref 32–36)
MCV RBC AUTO: 92 FL (ref 82–98)
MICROSCOPIC COMMENT: ABNORMAL
MONOCYTES # BLD AUTO: 0.5 K/UL (ref 0.3–1)
MONOCYTES NFR BLD: 4.9 % (ref 4–15)
NEUTROPHILS # BLD AUTO: 6.8 K/UL (ref 1.8–7.7)
NEUTROPHILS NFR BLD: 63.8 % (ref 38–73)
NITRITE UR QL STRIP: NEGATIVE
NRBC BLD-RTO: 0 /100 WBC
PH UR STRIP: 8 [PH] (ref 5–8)
PLATELET # BLD AUTO: 458 K/UL (ref 150–350)
PMV BLD AUTO: 10 FL (ref 9.2–12.9)
POC MOLECULAR INFLUENZA A AGN: NEGATIVE
POC MOLECULAR INFLUENZA B AGN: NEGATIVE
PROT UR QL STRIP: NEGATIVE
RBC # BLD AUTO: 4.22 M/UL (ref 4–5.4)
RBC #/AREA URNS AUTO: 11 /HPF (ref 0–4)
SARS-COV-2 RDRP RESP QL NAA+PROBE: NEGATIVE
SP GR UR STRIP: 1.01 (ref 1–1.03)
SQUAMOUS #/AREA URNS AUTO: 1 /HPF
URN SPEC COLLECT METH UR: ABNORMAL
WBC # BLD AUTO: 10.72 K/UL (ref 3.9–12.7)
WBC #/AREA URNS AUTO: 4 /HPF (ref 0–5)

## 2021-03-16 PROCEDURE — 96361 HYDRATE IV INFUSION ADD-ON: CPT

## 2021-03-16 PROCEDURE — 87040 BLOOD CULTURE FOR BACTERIA: CPT | Performed by: PHYSICIAN ASSISTANT

## 2021-03-16 PROCEDURE — 63600175 PHARM REV CODE 636 W HCPCS: Performed by: PHYSICIAN ASSISTANT

## 2021-03-16 PROCEDURE — 25000003 PHARM REV CODE 250: Performed by: PHYSICIAN ASSISTANT

## 2021-03-16 PROCEDURE — 85025 COMPLETE CBC W/AUTO DIFF WBC: CPT | Performed by: PHYSICIAN ASSISTANT

## 2021-03-16 PROCEDURE — 96375 TX/PRO/DX INJ NEW DRUG ADDON: CPT

## 2021-03-16 PROCEDURE — U0002 COVID-19 LAB TEST NON-CDC: HCPCS | Performed by: EMERGENCY MEDICINE

## 2021-03-16 PROCEDURE — 83605 ASSAY OF LACTIC ACID: CPT | Performed by: PHYSICIAN ASSISTANT

## 2021-03-16 PROCEDURE — 81001 URINALYSIS AUTO W/SCOPE: CPT | Performed by: PHYSICIAN ASSISTANT

## 2021-03-16 PROCEDURE — 83036 HEMOGLOBIN GLYCOSYLATED A1C: CPT | Performed by: PHYSICIAN ASSISTANT

## 2021-03-16 PROCEDURE — 86140 C-REACTIVE PROTEIN: CPT | Performed by: PHYSICIAN ASSISTANT

## 2021-03-16 PROCEDURE — 85652 RBC SED RATE AUTOMATED: CPT | Performed by: PHYSICIAN ASSISTANT

## 2021-03-16 PROCEDURE — 87651 STREP A DNA AMP PROBE: CPT | Performed by: PHYSICIAN ASSISTANT

## 2021-03-16 PROCEDURE — 99285 EMERGENCY DEPT VISIT HI MDM: CPT | Mod: CR,CS,, | Performed by: PHYSICIAN ASSISTANT

## 2021-03-16 PROCEDURE — 99285 PR EMERGENCY DEPT VISIT,LEVEL V: ICD-10-PCS | Mod: CR,CS,, | Performed by: PHYSICIAN ASSISTANT

## 2021-03-16 PROCEDURE — 87502 INFLUENZA DNA AMP PROBE: CPT

## 2021-03-16 PROCEDURE — 99285 EMERGENCY DEPT VISIT HI MDM: CPT | Mod: 25

## 2021-03-16 PROCEDURE — 12000002 HC ACUTE/MED SURGE SEMI-PRIVATE ROOM

## 2021-03-16 RX ORDER — IBUPROFEN 400 MG/1
800 TABLET ORAL
Status: COMPLETED | OUTPATIENT
Start: 2021-03-16 | End: 2021-03-16

## 2021-03-16 RX ORDER — ONDANSETRON 2 MG/ML
4 INJECTION INTRAMUSCULAR; INTRAVENOUS
Status: COMPLETED | OUTPATIENT
Start: 2021-03-16 | End: 2021-03-16

## 2021-03-16 RX ORDER — ACETAMINOPHEN 500 MG
1000 TABLET ORAL
Status: COMPLETED | OUTPATIENT
Start: 2021-03-16 | End: 2021-03-16

## 2021-03-16 RX ORDER — SULFAMETHOXAZOLE AND TRIMETHOPRIM 800; 160 MG/1; MG/1
1 TABLET ORAL
Status: ON HOLD | COMMUNITY
Start: 2021-03-16 | End: 2021-03-19

## 2021-03-16 RX ADMIN — IBUPROFEN 800 MG: 400 TABLET, FILM COATED ORAL at 11:03

## 2021-03-16 RX ADMIN — ONDANSETRON 4 MG: 2 INJECTION INTRAMUSCULAR; INTRAVENOUS at 11:03

## 2021-03-16 RX ADMIN — ACETAMINOPHEN 1000 MG: 500 TABLET ORAL at 09:03

## 2021-03-16 RX ADMIN — SODIUM CHLORIDE 1000 ML: 0.9 INJECTION, SOLUTION INTRAVENOUS at 09:03

## 2021-03-17 PROBLEM — R51.9 INTRACTABLE HEADACHE: Status: ACTIVE | Noted: 2021-03-17

## 2021-03-17 LAB
ALBUMIN SERPL BCP-MCNC: 3.2 G/DL (ref 3.5–5.2)
ALP SERPL-CCNC: 131 U/L (ref 55–135)
ALT SERPL W/O P-5'-P-CCNC: 10 U/L (ref 10–44)
ANION GAP SERPL CALC-SCNC: 9 MMOL/L (ref 8–16)
ANION GAP SERPL CALC-SCNC: 9 MMOL/L (ref 8–16)
AST SERPL-CCNC: 11 U/L (ref 10–40)
BASOPHILS # BLD AUTO: 0.05 K/UL (ref 0–0.2)
BASOPHILS NFR BLD: 0.4 % (ref 0–1.9)
BILIRUB SERPL-MCNC: 0.3 MG/DL (ref 0.1–1)
BUN SERPL-MCNC: 10 MG/DL (ref 6–20)
BUN SERPL-MCNC: 11 MG/DL (ref 6–20)
BUN SERPL-MCNC: 14 MG/DL (ref 6–30)
CALCIUM SERPL-MCNC: 8.6 MG/DL (ref 8.7–10.5)
CALCIUM SERPL-MCNC: 8.7 MG/DL (ref 8.7–10.5)
CHLORIDE SERPL-SCNC: 106 MMOL/L (ref 95–110)
CHLORIDE SERPL-SCNC: 107 MMOL/L (ref 95–110)
CHLORIDE SERPL-SCNC: 108 MMOL/L (ref 95–110)
CO2 SERPL-SCNC: 22 MMOL/L (ref 23–29)
CO2 SERPL-SCNC: 22 MMOL/L (ref 23–29)
CREAT SERPL-MCNC: 1.1 MG/DL (ref 0.5–1.4)
DIFFERENTIAL METHOD: ABNORMAL
EOSINOPHIL # BLD AUTO: 0 K/UL (ref 0–0.5)
EOSINOPHIL NFR BLD: 0 % (ref 0–8)
ERYTHROCYTE [DISTWIDTH] IN BLOOD BY AUTOMATED COUNT: 14.3 % (ref 11.5–14.5)
EST. GFR  (AFRICAN AMERICAN): >60 ML/MIN/1.73 M^2
EST. GFR  (AFRICAN AMERICAN): >60 ML/MIN/1.73 M^2
EST. GFR  (NON AFRICAN AMERICAN): >60 ML/MIN/1.73 M^2
EST. GFR  (NON AFRICAN AMERICAN): >60 ML/MIN/1.73 M^2
ESTIMATED AVG GLUCOSE: 114 MG/DL (ref 68–131)
GLUCOSE SERPL-MCNC: 125 MG/DL (ref 70–110)
GLUCOSE SERPL-MCNC: 161 MG/DL (ref 70–110)
GLUCOSE SERPL-MCNC: 161 MG/DL (ref 70–110)
HBA1C MFR BLD: 5.6 % (ref 4–5.6)
HCT VFR BLD AUTO: 36.3 % (ref 37–48.5)
HCT VFR BLD CALC: 36 %PCV (ref 36–54)
HGB BLD-MCNC: 11.9 G/DL (ref 12–16)
IMM GRANULOCYTES # BLD AUTO: 0.11 K/UL (ref 0–0.04)
IMM GRANULOCYTES NFR BLD AUTO: 0.9 % (ref 0–0.5)
LYMPHOCYTES # BLD AUTO: 1.6 K/UL (ref 1–4.8)
LYMPHOCYTES NFR BLD: 13.8 % (ref 18–48)
MCH RBC QN AUTO: 30.2 PG (ref 27–31)
MCHC RBC AUTO-ENTMCNC: 32.8 G/DL (ref 32–36)
MCV RBC AUTO: 92 FL (ref 82–98)
MONOCYTES # BLD AUTO: 0.3 K/UL (ref 0.3–1)
MONOCYTES NFR BLD: 2.8 % (ref 4–15)
NEUTROPHILS # BLD AUTO: 9.7 K/UL (ref 1.8–7.7)
NEUTROPHILS NFR BLD: 82.1 % (ref 38–73)
NRBC BLD-RTO: 0 /100 WBC
PLATELET # BLD AUTO: 410 K/UL (ref 150–350)
PMV BLD AUTO: 9.7 FL (ref 9.2–12.9)
POC IONIZED CALCIUM: 1.04 MMOL/L (ref 1.06–1.42)
POC TCO2 (MEASURED): 28 MMOL/L (ref 23–29)
POTASSIUM BLD-SCNC: 6.8 MMOL/L (ref 3.5–5.1)
POTASSIUM SERPL-SCNC: 4.2 MMOL/L (ref 3.5–5.1)
POTASSIUM SERPL-SCNC: 4.2 MMOL/L (ref 3.5–5.1)
PROT SERPL-MCNC: 7.5 G/DL (ref 6–8.4)
RBC # BLD AUTO: 3.94 M/UL (ref 4–5.4)
SAMPLE: ABNORMAL
SODIUM BLD-SCNC: 135 MMOL/L (ref 136–145)
SODIUM SERPL-SCNC: 138 MMOL/L (ref 136–145)
SODIUM SERPL-SCNC: 139 MMOL/L (ref 136–145)
WBC # BLD AUTO: 11.83 K/UL (ref 3.9–12.7)

## 2021-03-17 PROCEDURE — 63600175 PHARM REV CODE 636 W HCPCS: Performed by: PHYSICIAN ASSISTANT

## 2021-03-17 PROCEDURE — 96375 TX/PRO/DX INJ NEW DRUG ADDON: CPT

## 2021-03-17 PROCEDURE — 25000003 PHARM REV CODE 250: Performed by: STUDENT IN AN ORGANIZED HEALTH CARE EDUCATION/TRAINING PROGRAM

## 2021-03-17 PROCEDURE — 63600175 PHARM REV CODE 636 W HCPCS: Performed by: HOSPITALIST

## 2021-03-17 PROCEDURE — 63600175 PHARM REV CODE 636 W HCPCS: Performed by: EMERGENCY MEDICINE

## 2021-03-17 PROCEDURE — 11000001 HC ACUTE MED/SURG PRIVATE ROOM

## 2021-03-17 PROCEDURE — 25000003 PHARM REV CODE 250: Performed by: HOSPITALIST

## 2021-03-17 PROCEDURE — 99219 PR INITIAL OBSERVATION CARE,LEVL II: ICD-10-PCS | Mod: ,,, | Performed by: HOSPITALIST

## 2021-03-17 PROCEDURE — 96367 TX/PROPH/DG ADDL SEQ IV INF: CPT

## 2021-03-17 PROCEDURE — 96366 THER/PROPH/DIAG IV INF ADDON: CPT

## 2021-03-17 PROCEDURE — 96361 HYDRATE IV INFUSION ADD-ON: CPT

## 2021-03-17 PROCEDURE — G0378 HOSPITAL OBSERVATION PER HR: HCPCS

## 2021-03-17 PROCEDURE — 99219 PR INITIAL OBSERVATION CARE,LEVL II: CPT | Mod: ,,, | Performed by: HOSPITALIST

## 2021-03-17 PROCEDURE — 80048 BASIC METABOLIC PNL TOTAL CA: CPT | Performed by: HOSPITALIST

## 2021-03-17 PROCEDURE — 25000003 PHARM REV CODE 250: Performed by: EMERGENCY MEDICINE

## 2021-03-17 PROCEDURE — 25000003 PHARM REV CODE 250: Performed by: PHYSICIAN ASSISTANT

## 2021-03-17 PROCEDURE — 63600175 PHARM REV CODE 636 W HCPCS: Performed by: STUDENT IN AN ORGANIZED HEALTH CARE EDUCATION/TRAINING PROGRAM

## 2021-03-17 PROCEDURE — 85025 COMPLETE CBC W/AUTO DIFF WBC: CPT | Performed by: HOSPITALIST

## 2021-03-17 PROCEDURE — 80053 COMPREHEN METABOLIC PANEL: CPT | Performed by: PHYSICIAN ASSISTANT

## 2021-03-17 PROCEDURE — 96376 TX/PRO/DX INJ SAME DRUG ADON: CPT

## 2021-03-17 PROCEDURE — 96365 THER/PROPH/DIAG IV INF INIT: CPT

## 2021-03-17 RX ORDER — ACETAMINOPHEN 325 MG/1
650 TABLET ORAL EVERY 6 HOURS PRN
Status: DISCONTINUED | OUTPATIENT
Start: 2021-03-17 | End: 2021-03-20 | Stop reason: HOSPADM

## 2021-03-17 RX ORDER — ENOXAPARIN SODIUM 100 MG/ML
60 INJECTION SUBCUTANEOUS EVERY 12 HOURS
Status: DISCONTINUED | OUTPATIENT
Start: 2021-03-18 | End: 2021-03-18

## 2021-03-17 RX ORDER — SODIUM CHLORIDE 0.9 % (FLUSH) 0.9 %
10 SYRINGE (ML) INJECTION
Status: DISCONTINUED | OUTPATIENT
Start: 2021-03-17 | End: 2021-03-20 | Stop reason: HOSPADM

## 2021-03-17 RX ORDER — DEXAMETHASONE SODIUM PHOSPHATE 4 MG/ML
19 INJECTION, SOLUTION INTRA-ARTICULAR; INTRALESIONAL; INTRAMUSCULAR; INTRAVENOUS; SOFT TISSUE ONCE
Status: COMPLETED | OUTPATIENT
Start: 2021-03-17 | End: 2021-03-17

## 2021-03-17 RX ORDER — DEXAMETHASONE SODIUM PHOSPHATE 4 MG/ML
50 INJECTION, SOLUTION INTRA-ARTICULAR; INTRALESIONAL; INTRAMUSCULAR; INTRAVENOUS; SOFT TISSUE EVERY 12 HOURS
Status: DISCONTINUED | OUTPATIENT
Start: 2021-03-17 | End: 2021-03-17

## 2021-03-17 RX ORDER — DEXAMETHASONE SODIUM PHOSPHATE 4 MG/ML
40 INJECTION, SOLUTION INTRA-ARTICULAR; INTRALESIONAL; INTRAMUSCULAR; INTRAVENOUS; SOFT TISSUE EVERY 12 HOURS
Status: DISCONTINUED | OUTPATIENT
Start: 2021-03-17 | End: 2021-03-17

## 2021-03-17 RX ORDER — KETOROLAC TROMETHAMINE 30 MG/ML
15 INJECTION, SOLUTION INTRAMUSCULAR; INTRAVENOUS EVERY 8 HOURS PRN
Status: DISCONTINUED | OUTPATIENT
Start: 2021-03-17 | End: 2021-03-19

## 2021-03-17 RX ORDER — PREDNISONE 5 MG/1
5 TABLET ORAL DAILY
COMMUNITY
Start: 2020-05-15 | End: 2024-01-02

## 2021-03-17 RX ORDER — ONDANSETRON 2 MG/ML
4 INJECTION INTRAMUSCULAR; INTRAVENOUS EVERY 8 HOURS PRN
Status: DISCONTINUED | OUTPATIENT
Start: 2021-03-17 | End: 2021-03-18

## 2021-03-17 RX ORDER — TALC
6 POWDER (GRAM) TOPICAL NIGHTLY PRN
Status: DISCONTINUED | OUTPATIENT
Start: 2021-03-17 | End: 2021-03-20 | Stop reason: HOSPADM

## 2021-03-17 RX ORDER — FAMCICLOVIR 500 MG/1
500 TABLET ORAL DAILY
COMMUNITY
Start: 2021-03-09 | End: 2023-03-14

## 2021-03-17 RX ORDER — ACETAMINOPHEN 325 MG/1
650 TABLET ORAL EVERY 6 HOURS PRN
Status: DISCONTINUED | OUTPATIENT
Start: 2021-03-17 | End: 2021-03-17

## 2021-03-17 RX ORDER — ACETAMINOPHEN 325 MG/1
650 TABLET ORAL EVERY 8 HOURS PRN
Status: DISCONTINUED | OUTPATIENT
Start: 2021-03-17 | End: 2021-03-17

## 2021-03-17 RX ORDER — ENOXAPARIN SODIUM 100 MG/ML
40 INJECTION SUBCUTANEOUS EVERY 24 HOURS
Status: DISCONTINUED | OUTPATIENT
Start: 2021-03-17 | End: 2021-03-17

## 2021-03-17 RX ORDER — PROCHLORPERAZINE EDISYLATE 5 MG/ML
5 INJECTION INTRAMUSCULAR; INTRAVENOUS EVERY 6 HOURS PRN
Status: DISCONTINUED | OUTPATIENT
Start: 2021-03-17 | End: 2021-03-20 | Stop reason: HOSPADM

## 2021-03-17 RX ADMIN — ACETAMINOPHEN 650 MG: 325 TABLET ORAL at 11:03

## 2021-03-17 RX ADMIN — CEFTRIAXONE 2 G: 2 INJECTION, SOLUTION INTRAVENOUS at 03:03

## 2021-03-17 RX ADMIN — DEXAMETHASONE SODIUM PHOSPHATE 50 MG: 10 INJECTION INTRAMUSCULAR; INTRAVENOUS at 12:03

## 2021-03-17 RX ADMIN — AMPICILLIN SODIUM 2 G: 2 INJECTION, POWDER, FOR SOLUTION INTRAMUSCULAR; INTRAVENOUS at 04:03

## 2021-03-17 RX ADMIN — KETOROLAC TROMETHAMINE 15 MG: 30 INJECTION, SOLUTION INTRAMUSCULAR; INTRAVENOUS at 04:03

## 2021-03-17 RX ADMIN — VANCOMYCIN HYDROCHLORIDE 2000 MG: 10 INJECTION, POWDER, LYOPHILIZED, FOR SOLUTION INTRAVENOUS at 05:03

## 2021-03-17 RX ADMIN — CEFTRIAXONE 2 G: 2 INJECTION, SOLUTION INTRAVENOUS at 02:03

## 2021-03-17 RX ADMIN — AMPICILLIN SODIUM 2 G: 2 INJECTION, POWDER, FOR SOLUTION INTRAMUSCULAR; INTRAVENOUS at 10:03

## 2021-03-17 RX ADMIN — KETOROLAC TROMETHAMINE 15 MG: 30 INJECTION, SOLUTION INTRAMUSCULAR; INTRAVENOUS at 07:03

## 2021-03-17 RX ADMIN — ONDANSETRON 4 MG: 2 INJECTION INTRAMUSCULAR; INTRAVENOUS at 11:03

## 2021-03-17 RX ADMIN — DEXAMETHASONE SODIUM PHOSPHATE 19 MG: 4 INJECTION INTRA-ARTICULAR; INTRALESIONAL; INTRAMUSCULAR; INTRAVENOUS; SOFT TISSUE at 12:03

## 2021-03-17 RX ADMIN — ACYCLOVIR SODIUM 520 MG: 1000 INJECTION, SOLUTION INTRAVENOUS at 11:03

## 2021-03-17 RX ADMIN — VANCOMYCIN HYDROCHLORIDE 2000 MG: 10 INJECTION, POWDER, LYOPHILIZED, FOR SOLUTION INTRAVENOUS at 06:03

## 2021-03-17 RX ADMIN — ACYCLOVIR SODIUM 520 MG: 500 INJECTION, SOLUTION INTRAVENOUS at 01:03

## 2021-03-17 RX ADMIN — ACYCLOVIR SODIUM 520 MG: 1000 INJECTION, SOLUTION INTRAVENOUS at 10:03

## 2021-03-17 RX ADMIN — DEXAMETHASONE SODIUM PHOSPHATE 50 MG: 10 INJECTION INTRAMUSCULAR; INTRAVENOUS at 10:03

## 2021-03-18 ENCOUNTER — ANESTHESIA EVENT (OUTPATIENT)
Dept: OBSERVATION | Facility: HOSPITAL | Age: 41
End: 2021-03-18

## 2021-03-18 ENCOUNTER — ANESTHESIA EVENT (OUTPATIENT)
Dept: ENDOSCOPY | Facility: HOSPITAL | Age: 41
DRG: 098 | End: 2021-03-18
Payer: MEDICAID

## 2021-03-18 ENCOUNTER — ANESTHESIA (OUTPATIENT)
Dept: ENDOSCOPY | Facility: HOSPITAL | Age: 41
DRG: 098 | End: 2021-03-18
Payer: MEDICAID

## 2021-03-18 ENCOUNTER — ANESTHESIA (OUTPATIENT)
Dept: OBSERVATION | Facility: HOSPITAL | Age: 41
End: 2021-03-18

## 2021-03-18 PROBLEM — D84.9 IMMUNOSUPPRESSED STATUS: Status: ACTIVE | Noted: 2021-03-18

## 2021-03-18 LAB
ACID FAST MOD KINY STN SPEC: NORMAL
ANION GAP SERPL CALC-SCNC: 9 MMOL/L (ref 8–16)
ANISOCYTOSIS BLD QL SMEAR: SLIGHT
BASOPHILS # BLD AUTO: 0.07 K/UL (ref 0–0.2)
BASOPHILS NFR BLD: 0.3 % (ref 0–1.9)
BUN SERPL-MCNC: 17 MG/DL (ref 6–20)
CALCIUM SERPL-MCNC: 8.8 MG/DL (ref 8.7–10.5)
CHLORIDE SERPL-SCNC: 109 MMOL/L (ref 95–110)
CLARITY CSF: CLEAR
CLARITY CSF: CLEAR
CO2 SERPL-SCNC: 19 MMOL/L (ref 23–29)
COLOR CSF: COLORLESS
COLOR CSF: COLORLESS
CREAT SERPL-MCNC: 1.2 MG/DL (ref 0.5–1.4)
DIFFERENTIAL METHOD: ABNORMAL
EOSINOPHIL # BLD AUTO: 0 K/UL (ref 0–0.5)
EOSINOPHIL NFR BLD: 0 % (ref 0–8)
ERYTHROCYTE [DISTWIDTH] IN BLOOD BY AUTOMATED COUNT: 14.6 % (ref 11.5–14.5)
EST. GFR  (AFRICAN AMERICAN): >60 ML/MIN/1.73 M^2
EST. GFR  (NON AFRICAN AMERICAN): 57.1 ML/MIN/1.73 M^2
GLUCOSE CSF-MCNC: 84 MG/DL (ref 40–70)
GLUCOSE SERPL-MCNC: 154 MG/DL (ref 70–110)
HCT VFR BLD AUTO: 36.9 % (ref 37–48.5)
HGB BLD-MCNC: 11.6 G/DL (ref 12–16)
HYPOCHROMIA BLD QL SMEAR: ABNORMAL
IMM GRANULOCYTES # BLD AUTO: 0.58 K/UL (ref 0–0.04)
IMM GRANULOCYTES NFR BLD AUTO: 2.2 % (ref 0–0.5)
INDIA INK PREP CSF: NORMAL
LYMPHOCYTES # BLD AUTO: 2.6 K/UL (ref 1–4.8)
LYMPHOCYTES NFR BLD: 9.6 % (ref 18–48)
LYMPHOCYTES NFR CSF MANUAL: 22 % (ref 40–80)
LYMPHOCYTES NFR CSF MANUAL: 36 % (ref 40–80)
MCH RBC QN AUTO: 29.4 PG (ref 27–31)
MCHC RBC AUTO-ENTMCNC: 31.4 G/DL (ref 32–36)
MCV RBC AUTO: 94 FL (ref 82–98)
MONOCYTES # BLD AUTO: 0.5 K/UL (ref 0.3–1)
MONOCYTES NFR BLD: 1.8 % (ref 4–15)
MONOS+MACROS NFR CSF MANUAL: 55 % (ref 15–45)
MONOS+MACROS NFR CSF MANUAL: 72 % (ref 15–45)
NEUTROPHILS # BLD AUTO: 23 K/UL (ref 1.8–7.7)
NEUTROPHILS NFR BLD: 86.1 % (ref 38–73)
NEUTROPHILS NFR CSF MANUAL: 6 % (ref 0–6)
NEUTROPHILS NFR CSF MANUAL: 9 % (ref 0–6)
NRBC BLD-RTO: 0 /100 WBC
OVALOCYTES BLD QL SMEAR: ABNORMAL
PLATELET # BLD AUTO: 324 K/UL (ref 150–350)
PLATELET BLD QL SMEAR: ABNORMAL
PMV BLD AUTO: 10.2 FL (ref 9.2–12.9)
POLYCHROMASIA BLD QL SMEAR: ABNORMAL
POTASSIUM SERPL-SCNC: 4.9 MMOL/L (ref 3.5–5.1)
PROT CSF-MCNC: 45 MG/DL (ref 15–40)
RBC # BLD AUTO: 3.94 M/UL (ref 4–5.4)
RBC # CSF: 11 /CU MM
RBC # CSF: 136 /CU MM
SODIUM SERPL-SCNC: 137 MMOL/L (ref 136–145)
SPECIMEN VOL CSF: 1.8 ML
SPECIMEN VOL CSF: 2 ML
VANCOMYCIN TROUGH SERPL-MCNC: 13.6 UG/ML (ref 10–22)
WBC # BLD AUTO: 26.63 K/UL (ref 3.9–12.7)
WBC # CSF: 170 /CU MM (ref 0–5)
WBC # CSF: 192 /CU MM (ref 0–5)

## 2021-03-18 PROCEDURE — 87206 SMEAR FLUORESCENT/ACID STAI: CPT | Performed by: STUDENT IN AN ORGANIZED HEALTH CARE EDUCATION/TRAINING PROGRAM

## 2021-03-18 PROCEDURE — 87210 SMEAR WET MOUNT SALINE/INK: CPT | Performed by: STUDENT IN AN ORGANIZED HEALTH CARE EDUCATION/TRAINING PROGRAM

## 2021-03-18 PROCEDURE — 99000 SPECIMEN HANDLING OFFICE-LAB: CPT | Performed by: STUDENT IN AN ORGANIZED HEALTH CARE EDUCATION/TRAINING PROGRAM

## 2021-03-18 PROCEDURE — 63600175 PHARM REV CODE 636 W HCPCS: Performed by: HOSPITALIST

## 2021-03-18 PROCEDURE — 96376 TX/PRO/DX INJ SAME DRUG ADON: CPT

## 2021-03-18 PROCEDURE — 63600175 PHARM REV CODE 636 W HCPCS: Performed by: EMERGENCY MEDICINE

## 2021-03-18 PROCEDURE — 25000003 PHARM REV CODE 250: Performed by: STUDENT IN AN ORGANIZED HEALTH CARE EDUCATION/TRAINING PROGRAM

## 2021-03-18 PROCEDURE — 25000003 PHARM REV CODE 250: Performed by: HOSPITALIST

## 2021-03-18 PROCEDURE — 62270 DX LMBR SPI PNXR: CPT | Performed by: STUDENT IN AN ORGANIZED HEALTH CARE EDUCATION/TRAINING PROGRAM

## 2021-03-18 PROCEDURE — 62270 LUMBAR PUNCTURE: ICD-10-PCS | Mod: 59,,, | Performed by: ANESTHESIOLOGY

## 2021-03-18 PROCEDURE — 80202 ASSAY OF VANCOMYCIN: CPT | Performed by: HOSPITALIST

## 2021-03-18 PROCEDURE — 99233 SBSQ HOSP IP/OBS HIGH 50: CPT | Mod: ,,, | Performed by: HOSPITALIST

## 2021-03-18 PROCEDURE — 99233 PR SUBSEQUENT HOSPITAL CARE,LEVL III: ICD-10-PCS | Mod: ,,, | Performed by: HOSPITALIST

## 2021-03-18 PROCEDURE — 89051 BODY FLUID CELL COUNT: CPT | Mod: 91 | Performed by: STUDENT IN AN ORGANIZED HEALTH CARE EDUCATION/TRAINING PROGRAM

## 2021-03-18 PROCEDURE — 25000003 PHARM REV CODE 250: Performed by: EMERGENCY MEDICINE

## 2021-03-18 PROCEDURE — 80048 BASIC METABOLIC PNL TOTAL CA: CPT | Performed by: HOSPITALIST

## 2021-03-18 PROCEDURE — 84157 ASSAY OF PROTEIN OTHER: CPT | Performed by: PHYSICIAN ASSISTANT

## 2021-03-18 PROCEDURE — 87529 HSV DNA AMP PROBE: CPT | Performed by: PHYSICIAN ASSISTANT

## 2021-03-18 PROCEDURE — 87102 FUNGUS ISOLATION CULTURE: CPT | Performed by: STUDENT IN AN ORGANIZED HEALTH CARE EDUCATION/TRAINING PROGRAM

## 2021-03-18 PROCEDURE — 36415 COLL VENOUS BLD VENIPUNCTURE: CPT | Performed by: HOSPITALIST

## 2021-03-18 PROCEDURE — 88108 PR  CYTOPATH FLUIDS,CONCENTRATN,INTERP: ICD-10-PCS | Mod: 26,,, | Performed by: PATHOLOGY

## 2021-03-18 PROCEDURE — 82945 GLUCOSE OTHER FLUID: CPT | Performed by: PHYSICIAN ASSISTANT

## 2021-03-18 PROCEDURE — 89051 BODY FLUID CELL COUNT: CPT | Performed by: PHYSICIAN ASSISTANT

## 2021-03-18 PROCEDURE — 11000001 HC ACUTE MED/SURG PRIVATE ROOM

## 2021-03-18 PROCEDURE — 88108 CYTOPATH CONCENTRATE TECH: CPT | Performed by: PATHOLOGY

## 2021-03-18 PROCEDURE — 85025 COMPLETE CBC W/AUTO DIFF WBC: CPT | Performed by: HOSPITALIST

## 2021-03-18 PROCEDURE — 87070 CULTURE OTHR SPECIMN AEROBIC: CPT | Performed by: PHYSICIAN ASSISTANT

## 2021-03-18 PROCEDURE — 87498 ENTEROVIRUS PROBE&REVRS TRNS: CPT | Performed by: STUDENT IN AN ORGANIZED HEALTH CARE EDUCATION/TRAINING PROGRAM

## 2021-03-18 PROCEDURE — 63600175 PHARM REV CODE 636 W HCPCS: Performed by: STUDENT IN AN ORGANIZED HEALTH CARE EDUCATION/TRAINING PROGRAM

## 2021-03-18 PROCEDURE — 87205 SMEAR GRAM STAIN: CPT | Performed by: PHYSICIAN ASSISTANT

## 2021-03-18 PROCEDURE — 62270 DX LMBR SPI PNXR: CPT | Mod: 59,,, | Performed by: ANESTHESIOLOGY

## 2021-03-18 PROCEDURE — 87206 SMEAR FLUORESCENT/ACID STAI: CPT | Mod: 91 | Performed by: STUDENT IN AN ORGANIZED HEALTH CARE EDUCATION/TRAINING PROGRAM

## 2021-03-18 PROCEDURE — 88108 CYTOPATH CONCENTRATE TECH: CPT | Mod: 26,,, | Performed by: PATHOLOGY

## 2021-03-18 PROCEDURE — 87116 MYCOBACTERIA CULTURE: CPT | Performed by: STUDENT IN AN ORGANIZED HEALTH CARE EDUCATION/TRAINING PROGRAM

## 2021-03-18 RX ORDER — ENOXAPARIN SODIUM 100 MG/ML
40 INJECTION SUBCUTANEOUS EVERY 12 HOURS
Status: DISCONTINUED | OUTPATIENT
Start: 2021-03-19 | End: 2021-03-20 | Stop reason: HOSPADM

## 2021-03-18 RX ORDER — ONDANSETRON 2 MG/ML
4 INJECTION INTRAMUSCULAR; INTRAVENOUS EVERY 8 HOURS PRN
Status: DISCONTINUED | OUTPATIENT
Start: 2021-03-18 | End: 2021-03-20 | Stop reason: HOSPADM

## 2021-03-18 RX ORDER — ONDANSETRON 4 MG/1
4 TABLET, ORALLY DISINTEGRATING ORAL EVERY 6 HOURS PRN
Status: DISCONTINUED | OUTPATIENT
Start: 2021-03-18 | End: 2021-03-20 | Stop reason: HOSPADM

## 2021-03-18 RX ADMIN — CEFTRIAXONE 2 G: 2 INJECTION, SOLUTION INTRAVENOUS at 01:03

## 2021-03-18 RX ADMIN — CEFTRIAXONE 2 G: 2 INJECTION, SOLUTION INTRAVENOUS at 04:03

## 2021-03-18 RX ADMIN — DEXAMETHASONE SODIUM PHOSPHATE 50 MG: 10 INJECTION INTRAMUSCULAR; INTRAVENOUS at 12:03

## 2021-03-18 RX ADMIN — SODIUM CHLORIDE, SODIUM LACTATE, POTASSIUM CHLORIDE, AND CALCIUM CHLORIDE 500 ML: .6; .31; .03; .02 INJECTION, SOLUTION INTRAVENOUS at 09:03

## 2021-03-18 RX ADMIN — AMPICILLIN SODIUM 2 G: 2 INJECTION, POWDER, FOR SOLUTION INTRAMUSCULAR; INTRAVENOUS at 02:03

## 2021-03-18 RX ADMIN — AMPICILLIN SODIUM 2 G: 2 INJECTION, POWDER, FOR SOLUTION INTRAMUSCULAR; INTRAVENOUS at 06:03

## 2021-03-18 RX ADMIN — SODIUM CHLORIDE, SODIUM LACTATE, POTASSIUM CHLORIDE, AND CALCIUM CHLORIDE 500 ML: .6; .31; .03; .02 INJECTION, SOLUTION INTRAVENOUS at 12:03

## 2021-03-18 RX ADMIN — ACYCLOVIR SODIUM 520 MG: 1000 INJECTION, SOLUTION INTRAVENOUS at 06:03

## 2021-03-18 RX ADMIN — ACETAMINOPHEN 650 MG: 325 TABLET ORAL at 12:03

## 2021-03-18 RX ADMIN — VANCOMYCIN HYDROCHLORIDE 2000 MG: 10 INJECTION, POWDER, LYOPHILIZED, FOR SOLUTION INTRAVENOUS at 08:03

## 2021-03-18 RX ADMIN — ACYCLOVIR SODIUM 520 MG: 1000 INJECTION, SOLUTION INTRAVENOUS at 10:03

## 2021-03-18 RX ADMIN — ONDANSETRON 4 MG: 4 TABLET, ORALLY DISINTEGRATING ORAL at 09:03

## 2021-03-18 RX ADMIN — ACETAMINOPHEN 650 MG: 325 TABLET ORAL at 09:03

## 2021-03-18 RX ADMIN — KETOROLAC TROMETHAMINE 15 MG: 30 INJECTION, SOLUTION INTRAMUSCULAR; INTRAVENOUS at 03:03

## 2021-03-18 RX ADMIN — AMPICILLIN SODIUM 2 G: 2 INJECTION, POWDER, FOR SOLUTION INTRAMUSCULAR; INTRAVENOUS at 09:03

## 2021-03-18 RX ADMIN — GUAIFENESIN AND DEXTROMETHORPHAN HYDROBROMIDE 1 TABLET: 600; 30 TABLET, EXTENDED RELEASE ORAL at 09:03

## 2021-03-18 RX ADMIN — VANCOMYCIN HYDROCHLORIDE 2250 MG: 1 INJECTION, POWDER, LYOPHILIZED, FOR SOLUTION INTRAVENOUS at 10:03

## 2021-03-19 LAB
ANION GAP SERPL CALC-SCNC: 8 MMOL/L (ref 8–16)
ANISOCYTOSIS BLD QL SMEAR: SLIGHT
BASOPHILS # BLD AUTO: 0.06 K/UL (ref 0–0.2)
BASOPHILS NFR BLD: 0.2 % (ref 0–1.9)
BUN SERPL-MCNC: 19 MG/DL (ref 6–20)
CALCIUM SERPL-MCNC: 8.3 MG/DL (ref 8.7–10.5)
CHLORIDE SERPL-SCNC: 105 MMOL/L (ref 95–110)
CO2 SERPL-SCNC: 25 MMOL/L (ref 23–29)
CREAT SERPL-MCNC: 1 MG/DL (ref 0.5–1.4)
DIFFERENTIAL METHOD: ABNORMAL
EOSINOPHIL # BLD AUTO: 0 K/UL (ref 0–0.5)
EOSINOPHIL NFR BLD: 0 % (ref 0–8)
ERYTHROCYTE [DISTWIDTH] IN BLOOD BY AUTOMATED COUNT: 14.9 % (ref 11.5–14.5)
EST. GFR  (AFRICAN AMERICAN): >60 ML/MIN/1.73 M^2
EST. GFR  (NON AFRICAN AMERICAN): >60 ML/MIN/1.73 M^2
GLUCOSE SERPL-MCNC: 121 MG/DL (ref 70–110)
HCT VFR BLD AUTO: 35.4 % (ref 37–48.5)
HGB BLD-MCNC: 11.1 G/DL (ref 12–16)
IMM GRANULOCYTES # BLD AUTO: 0.59 K/UL (ref 0–0.04)
IMM GRANULOCYTES NFR BLD AUTO: 2.4 % (ref 0–0.5)
LYMPHOCYTES # BLD AUTO: 1.9 K/UL (ref 1–4.8)
LYMPHOCYTES NFR BLD: 7.7 % (ref 18–48)
MCH RBC QN AUTO: 29.2 PG (ref 27–31)
MCHC RBC AUTO-ENTMCNC: 31.4 G/DL (ref 32–36)
MCV RBC AUTO: 93 FL (ref 82–98)
MONOCYTES # BLD AUTO: 0.7 K/UL (ref 0.3–1)
MONOCYTES NFR BLD: 2.9 % (ref 4–15)
NEUTROPHILS # BLD AUTO: 21.2 K/UL (ref 1.8–7.7)
NEUTROPHILS NFR BLD: 86.8 % (ref 38–73)
NRBC BLD-RTO: 0 /100 WBC
PLATELET # BLD AUTO: 400 K/UL (ref 150–350)
PLATELET BLD QL SMEAR: ABNORMAL
PMV BLD AUTO: 10.4 FL (ref 9.2–12.9)
POLYCHROMASIA BLD QL SMEAR: ABNORMAL
POTASSIUM SERPL-SCNC: 4.3 MMOL/L (ref 3.5–5.1)
RBC # BLD AUTO: 3.8 M/UL (ref 4–5.4)
SODIUM SERPL-SCNC: 138 MMOL/L (ref 136–145)
WBC # BLD AUTO: 24.46 K/UL (ref 3.9–12.7)

## 2021-03-19 PROCEDURE — 85025 COMPLETE CBC W/AUTO DIFF WBC: CPT | Performed by: HOSPITALIST

## 2021-03-19 PROCEDURE — 36410 VNPNXR 3YR/> PHY/QHP DX/THER: CPT

## 2021-03-19 PROCEDURE — 99233 SBSQ HOSP IP/OBS HIGH 50: CPT | Mod: ,,, | Performed by: HOSPITALIST

## 2021-03-19 PROCEDURE — 25000003 PHARM REV CODE 250: Performed by: STUDENT IN AN ORGANIZED HEALTH CARE EDUCATION/TRAINING PROGRAM

## 2021-03-19 PROCEDURE — 99223 PR INITIAL HOSPITAL CARE,LEVL III: ICD-10-PCS | Mod: ,,, | Performed by: INTERNAL MEDICINE

## 2021-03-19 PROCEDURE — C1751 CATH, INF, PER/CENT/MIDLINE: HCPCS

## 2021-03-19 PROCEDURE — 63600175 PHARM REV CODE 636 W HCPCS: Performed by: HOSPITALIST

## 2021-03-19 PROCEDURE — 63600175 PHARM REV CODE 636 W HCPCS: Performed by: STUDENT IN AN ORGANIZED HEALTH CARE EDUCATION/TRAINING PROGRAM

## 2021-03-19 PROCEDURE — 25000003 PHARM REV CODE 250: Performed by: EMERGENCY MEDICINE

## 2021-03-19 PROCEDURE — 80048 BASIC METABOLIC PNL TOTAL CA: CPT | Performed by: HOSPITALIST

## 2021-03-19 PROCEDURE — 25000003 PHARM REV CODE 250: Performed by: HOSPITALIST

## 2021-03-19 PROCEDURE — 63600175 PHARM REV CODE 636 W HCPCS: Performed by: EMERGENCY MEDICINE

## 2021-03-19 PROCEDURE — 99223 1ST HOSP IP/OBS HIGH 75: CPT | Mod: ,,, | Performed by: INTERNAL MEDICINE

## 2021-03-19 PROCEDURE — 76937 US GUIDE VASCULAR ACCESS: CPT

## 2021-03-19 PROCEDURE — 36415 COLL VENOUS BLD VENIPUNCTURE: CPT | Performed by: HOSPITALIST

## 2021-03-19 PROCEDURE — 11000001 HC ACUTE MED/SURG PRIVATE ROOM

## 2021-03-19 PROCEDURE — 99233 PR SUBSEQUENT HOSPITAL CARE,LEVL III: ICD-10-PCS | Mod: ,,, | Performed by: HOSPITALIST

## 2021-03-19 RX ORDER — DICYCLOMINE HYDROCHLORIDE 20 MG/1
20 TABLET ORAL
Status: DISCONTINUED | OUTPATIENT
Start: 2021-03-19 | End: 2021-03-19

## 2021-03-19 RX ORDER — AMITRIPTYLINE HYDROCHLORIDE 25 MG/1
25 TABLET, FILM COATED ORAL NIGHTLY
Status: DISCONTINUED | OUTPATIENT
Start: 2021-03-19 | End: 2021-03-20 | Stop reason: HOSPADM

## 2021-03-19 RX ORDER — AMITRIPTYLINE HYDROCHLORIDE 25 MG/1
25 TABLET, FILM COATED ORAL NIGHTLY
COMMUNITY
End: 2023-03-14

## 2021-03-19 RX ORDER — DICYCLOMINE HYDROCHLORIDE 20 MG/1
20 TABLET ORAL 4 TIMES DAILY PRN
Status: DISCONTINUED | OUTPATIENT
Start: 2021-03-19 | End: 2021-03-20 | Stop reason: HOSPADM

## 2021-03-19 RX ORDER — LANOLIN ALCOHOL/MO/W.PET/CERES
400 CREAM (GRAM) TOPICAL ONCE
Status: COMPLETED | OUTPATIENT
Start: 2021-03-19 | End: 2021-03-19

## 2021-03-19 RX ORDER — DULOXETIN HYDROCHLORIDE 30 MG/1
30 CAPSULE, DELAYED RELEASE ORAL 2 TIMES DAILY
Status: DISCONTINUED | OUTPATIENT
Start: 2021-03-19 | End: 2021-03-20 | Stop reason: HOSPADM

## 2021-03-19 RX ORDER — OMEPRAZOLE 20 MG/1
20 CAPSULE, DELAYED RELEASE ORAL DAILY
COMMUNITY
End: 2024-01-02

## 2021-03-19 RX ORDER — KETOROLAC TROMETHAMINE 30 MG/ML
30 INJECTION, SOLUTION INTRAMUSCULAR; INTRAVENOUS EVERY 8 HOURS PRN
Status: ACTIVE | OUTPATIENT
Start: 2021-03-19 | End: 2021-03-20

## 2021-03-19 RX ORDER — HYDROCHLOROTHIAZIDE 12.5 MG/1
12.5 CAPSULE ORAL DAILY
COMMUNITY
End: 2023-03-14

## 2021-03-19 RX ORDER — ATORVASTATIN CALCIUM 10 MG/1
10 TABLET, FILM COATED ORAL NIGHTLY
Status: DISCONTINUED | OUTPATIENT
Start: 2021-03-19 | End: 2021-03-20 | Stop reason: HOSPADM

## 2021-03-19 RX ORDER — DULOXETIN HYDROCHLORIDE 30 MG/1
30 CAPSULE, DELAYED RELEASE ORAL 2 TIMES DAILY
COMMUNITY
End: 2023-03-14

## 2021-03-19 RX ORDER — CETIRIZINE HYDROCHLORIDE 10 MG/1
10 TABLET ORAL DAILY
COMMUNITY

## 2021-03-19 RX ORDER — PREDNISONE 5 MG/1
5 TABLET ORAL DAILY
Status: DISCONTINUED | OUTPATIENT
Start: 2021-03-20 | End: 2021-03-20 | Stop reason: HOSPADM

## 2021-03-19 RX ORDER — ERGOCALCIFEROL 1.25 MG/1
50000 CAPSULE ORAL
COMMUNITY
End: 2024-01-02

## 2021-03-19 RX ORDER — ATORVASTATIN CALCIUM 10 MG/1
10 TABLET, FILM COATED ORAL DAILY
COMMUNITY
End: 2024-01-02

## 2021-03-19 RX ADMIN — AMPICILLIN SODIUM 2 G: 2 INJECTION, POWDER, FOR SOLUTION INTRAMUSCULAR; INTRAVENOUS at 02:03

## 2021-03-19 RX ADMIN — ACETAMINOPHEN 650 MG: 325 TABLET ORAL at 09:03

## 2021-03-19 RX ADMIN — DICYCLOMINE HYDROCHLORIDE 20 MG: 20 TABLET ORAL at 09:03

## 2021-03-19 RX ADMIN — Medication 400 MG: at 09:03

## 2021-03-19 RX ADMIN — KETOROLAC TROMETHAMINE 15 MG: 30 INJECTION, SOLUTION INTRAMUSCULAR; INTRAVENOUS at 09:03

## 2021-03-19 RX ADMIN — AMPICILLIN SODIUM 2 G: 2 INJECTION, POWDER, FOR SOLUTION INTRAMUSCULAR; INTRAVENOUS at 06:03

## 2021-03-19 RX ADMIN — SODIUM CHLORIDE, SODIUM LACTATE, POTASSIUM CHLORIDE, AND CALCIUM CHLORIDE 500 ML: .6; .31; .03; .02 INJECTION, SOLUTION INTRAVENOUS at 02:03

## 2021-03-19 RX ADMIN — ACYCLOVIR SODIUM 520 MG: 1000 INJECTION, SOLUTION INTRAVENOUS at 05:03

## 2021-03-19 RX ADMIN — DEXAMETHASONE SODIUM PHOSPHATE 50 MG: 10 INJECTION INTRAMUSCULAR; INTRAVENOUS at 12:03

## 2021-03-19 RX ADMIN — DEXAMETHASONE SODIUM PHOSPHATE 50 MG: 10 INJECTION INTRAMUSCULAR; INTRAVENOUS at 02:03

## 2021-03-19 RX ADMIN — AMITRIPTYLINE HYDROCHLORIDE 25 MG: 25 TABLET, FILM COATED ORAL at 09:03

## 2021-03-19 RX ADMIN — CEFTRIAXONE 2 G: 2 INJECTION, SOLUTION INTRAVENOUS at 05:03

## 2021-03-19 RX ADMIN — ACETAMINOPHEN 650 MG: 325 TABLET ORAL at 01:03

## 2021-03-19 RX ADMIN — ATORVASTATIN CALCIUM 10 MG: 10 TABLET, FILM COATED ORAL at 09:03

## 2021-03-19 RX ADMIN — ENOXAPARIN SODIUM 40 MG: 40 INJECTION SUBCUTANEOUS at 09:03

## 2021-03-19 RX ADMIN — ACYCLOVIR SODIUM 520 MG: 1000 INJECTION, SOLUTION INTRAVENOUS at 01:03

## 2021-03-19 RX ADMIN — ENOXAPARIN SODIUM 40 MG: 40 INJECTION SUBCUTANEOUS at 08:03

## 2021-03-19 RX ADMIN — AMPICILLIN SODIUM 2 G: 2 INJECTION, POWDER, FOR SOLUTION INTRAMUSCULAR; INTRAVENOUS at 10:03

## 2021-03-19 RX ADMIN — ACETAMINOPHEN 650 MG: 325 TABLET ORAL at 05:03

## 2021-03-19 RX ADMIN — KETOROLAC TROMETHAMINE 30 MG: 30 INJECTION, SOLUTION INTRAMUSCULAR; INTRAVENOUS at 05:03

## 2021-03-19 RX ADMIN — DULOXETINE HYDROCHLORIDE 30 MG: 30 CAPSULE, DELAYED RELEASE ORAL at 09:03

## 2021-03-19 RX ADMIN — ACYCLOVIR SODIUM 520 MG: 1000 INJECTION, SOLUTION INTRAVENOUS at 08:03

## 2021-03-19 RX ADMIN — VANCOMYCIN HYDROCHLORIDE 2250 MG: 1 INJECTION, POWDER, LYOPHILIZED, FOR SOLUTION INTRAVENOUS at 11:03

## 2021-03-19 RX ADMIN — DICYCLOMINE HYDROCHLORIDE 20 MG: 20 TABLET ORAL at 05:03

## 2021-03-20 VITALS
SYSTOLIC BLOOD PRESSURE: 160 MMHG | DIASTOLIC BLOOD PRESSURE: 93 MMHG | HEART RATE: 100 BPM | TEMPERATURE: 99 F | RESPIRATION RATE: 20 BRPM | OXYGEN SATURATION: 95 % | WEIGHT: 293 LBS | HEIGHT: 63 IN | BODY MASS INDEX: 51.91 KG/M2

## 2021-03-20 LAB
ANION GAP SERPL CALC-SCNC: 10 MMOL/L (ref 8–16)
ANISOCYTOSIS BLD QL SMEAR: SLIGHT
BASOPHILS NFR BLD: 0 % (ref 0–1.9)
BUN SERPL-MCNC: 20 MG/DL (ref 6–20)
CALCIUM SERPL-MCNC: 8.7 MG/DL (ref 8.7–10.5)
CHLORIDE SERPL-SCNC: 106 MMOL/L (ref 95–110)
CO2 SERPL-SCNC: 23 MMOL/L (ref 23–29)
CREAT SERPL-MCNC: 1 MG/DL (ref 0.5–1.4)
DIFFERENTIAL METHOD: ABNORMAL
EOSINOPHIL NFR BLD: 0 % (ref 0–8)
ERYTHROCYTE [DISTWIDTH] IN BLOOD BY AUTOMATED COUNT: 14.8 % (ref 11.5–14.5)
EST. GFR  (AFRICAN AMERICAN): >60 ML/MIN/1.73 M^2
EST. GFR  (NON AFRICAN AMERICAN): >60 ML/MIN/1.73 M^2
GLUCOSE SERPL-MCNC: 126 MG/DL (ref 70–110)
HCT VFR BLD AUTO: 37.3 % (ref 37–48.5)
HGB BLD-MCNC: 11.9 G/DL (ref 12–16)
HSV1, PCR, CSF: NEGATIVE
HSV1, PCR, CSF: NEGATIVE
HSV2, PCR, CSF: NEGATIVE
HSV2, PCR, CSF: NEGATIVE
IMM GRANULOCYTES # BLD AUTO: ABNORMAL K/UL (ref 0–0.04)
IMM GRANULOCYTES NFR BLD AUTO: ABNORMAL % (ref 0–0.5)
LYMPHOCYTES NFR BLD: 12 % (ref 18–48)
MCH RBC QN AUTO: 30.3 PG (ref 27–31)
MCHC RBC AUTO-ENTMCNC: 31.9 G/DL (ref 32–36)
MCV RBC AUTO: 95 FL (ref 82–98)
METAMYELOCYTES NFR BLD MANUAL: 1 %
MONOCYTES NFR BLD: 5 % (ref 4–15)
MYELOCYTES NFR BLD MANUAL: 1 %
NEUTROPHILS NFR BLD: 80 % (ref 38–73)
NEUTS BAND NFR BLD MANUAL: 1 %
NRBC BLD-RTO: 0 /100 WBC
PLATELET # BLD AUTO: 379 K/UL (ref 150–350)
PLATELET BLD QL SMEAR: ABNORMAL
PMV BLD AUTO: 10.6 FL (ref 9.2–12.9)
POTASSIUM SERPL-SCNC: 4.5 MMOL/L (ref 3.5–5.1)
RBC # BLD AUTO: 3.93 M/UL (ref 4–5.4)
SODIUM SERPL-SCNC: 139 MMOL/L (ref 136–145)
WBC # BLD AUTO: 23.19 K/UL (ref 3.9–12.7)

## 2021-03-20 PROCEDURE — 99238 HOSP IP/OBS DSCHRG MGMT 30/<: CPT | Mod: ,,, | Performed by: HOSPITALIST

## 2021-03-20 PROCEDURE — 99233 PR SUBSEQUENT HOSPITAL CARE,LEVL III: ICD-10-PCS | Mod: ,,, | Performed by: INTERNAL MEDICINE

## 2021-03-20 PROCEDURE — 25000003 PHARM REV CODE 250: Performed by: STUDENT IN AN ORGANIZED HEALTH CARE EDUCATION/TRAINING PROGRAM

## 2021-03-20 PROCEDURE — 25000003 PHARM REV CODE 250: Performed by: HOSPITALIST

## 2021-03-20 PROCEDURE — 63600175 PHARM REV CODE 636 W HCPCS: Performed by: STUDENT IN AN ORGANIZED HEALTH CARE EDUCATION/TRAINING PROGRAM

## 2021-03-20 PROCEDURE — 80048 BASIC METABOLIC PNL TOTAL CA: CPT | Performed by: HOSPITALIST

## 2021-03-20 PROCEDURE — 36415 COLL VENOUS BLD VENIPUNCTURE: CPT | Performed by: HOSPITALIST

## 2021-03-20 PROCEDURE — 85027 COMPLETE CBC AUTOMATED: CPT | Performed by: HOSPITALIST

## 2021-03-20 PROCEDURE — 85007 BL SMEAR W/DIFF WBC COUNT: CPT | Performed by: HOSPITALIST

## 2021-03-20 PROCEDURE — 99238 PR HOSPITAL DISCHARGE DAY,<30 MIN: ICD-10-PCS | Mod: ,,, | Performed by: HOSPITALIST

## 2021-03-20 PROCEDURE — 99233 SBSQ HOSP IP/OBS HIGH 50: CPT | Mod: ,,, | Performed by: INTERNAL MEDICINE

## 2021-03-20 PROCEDURE — 63600175 PHARM REV CODE 636 W HCPCS: Performed by: HOSPITALIST

## 2021-03-20 RX ORDER — PANTOPRAZOLE SODIUM 40 MG/1
40 TABLET, DELAYED RELEASE ORAL DAILY
Status: DISCONTINUED | OUTPATIENT
Start: 2021-03-20 | End: 2021-03-20 | Stop reason: HOSPADM

## 2021-03-20 RX ADMIN — SODIUM CHLORIDE, SODIUM LACTATE, POTASSIUM CHLORIDE, AND CALCIUM CHLORIDE 500 ML: .6; .31; .03; .02 INJECTION, SOLUTION INTRAVENOUS at 02:03

## 2021-03-20 RX ADMIN — ACYCLOVIR SODIUM 520 MG: 1000 INJECTION, SOLUTION INTRAVENOUS at 09:03

## 2021-03-20 RX ADMIN — PREDNISONE 5 MG: 5 TABLET ORAL at 03:03

## 2021-03-20 RX ADMIN — DICYCLOMINE HYDROCHLORIDE 20 MG: 20 TABLET ORAL at 01:03

## 2021-03-20 RX ADMIN — ACETAMINOPHEN 650 MG: 325 TABLET ORAL at 10:03

## 2021-03-20 RX ADMIN — DULOXETINE HYDROCHLORIDE 30 MG: 30 CAPSULE, DELAYED RELEASE ORAL at 09:03

## 2021-03-20 RX ADMIN — ACYCLOVIR SODIUM 520 MG: 1000 INJECTION, SOLUTION INTRAVENOUS at 02:03

## 2021-03-20 RX ADMIN — PANTOPRAZOLE SODIUM 40 MG: 40 TABLET, DELAYED RELEASE ORAL at 03:03

## 2021-03-20 RX ADMIN — ENOXAPARIN SODIUM 40 MG: 40 INJECTION SUBCUTANEOUS at 09:03

## 2021-03-21 LAB
EV RNA SPEC QL NAA+PROBE: NEGATIVE
EV RNA SPEC QL NAA+PROBE: NEGATIVE
SPECIMEN SOURCE: NORMAL
SPECIMEN SOURCE: NORMAL

## 2021-03-22 LAB
BACTERIA BLD CULT: NORMAL
BACTERIA BLD CULT: NORMAL
FINAL PATHOLOGIC DIAGNOSIS: NORMAL
Lab: NORMAL

## 2021-03-23 LAB
BACTERIA CSF CULT: NO GROWTH
GRAM STN SPEC: NORMAL

## 2021-04-21 LAB — FUNGUS SPEC CULT: NORMAL

## 2021-05-20 LAB
ACID FAST MOD KINY STN SPEC: NORMAL
MYCOBACTERIUM SPEC QL CULT: NORMAL

## 2022-01-11 ENCOUNTER — CLINICAL SUPPORT (OUTPATIENT)
Dept: REHABILITATION | Facility: HOSPITAL | Age: 42
End: 2022-01-11
Payer: MEDICAID

## 2022-01-11 DIAGNOSIS — M62.89 PELVIC FLOOR DYSFUNCTION: ICD-10-CM

## 2022-01-11 DIAGNOSIS — R10.2 PELVIC PAIN: ICD-10-CM

## 2022-01-11 DIAGNOSIS — M62.838 MUSCLE SPASM: ICD-10-CM

## 2022-01-11 PROCEDURE — 97163 PT EVAL HIGH COMPLEX 45 MIN: CPT | Mod: PO

## 2022-01-11 PROCEDURE — 97530 THERAPEUTIC ACTIVITIES: CPT | Mod: PO

## 2022-01-11 NOTE — PATIENT INSTRUCTIONS
Before next visit, purchase a pelvic wand from [employer] (can buy direct from the website). Bring it to your next session.    Pelvic floor check-ins: Begin to become aware of if/when holding pelvic muscle tension throughout the day and try to relax/let go. Notice if you have triggers. Certain activities, emotions, or environments may activate these muscles. Once you have become aware of this, you can try to change your habits and thought patterns to prevent these triggers from creating as much tension. Also, notice what things work for you in terms of relaxation - this can look different for everyone - positive self talk, deep breath, body scan, etc.    Home Exercise Program: 01/11/2022    Bowel Movement Body Mechanics  1. Sit on the toilet comfortably with legs and buttocks relaxed.  2. Put your feet on a step stool or squatty potty (8 inches tall).  3. Lean forward while keeping your back straight and rest your elbows on your knees.  4. Keep your knees apart.  5. Exhale like you are blowing out birthday candles while you gently bear down.     Do not strain and Do not hold your breath.

## 2022-01-12 PROBLEM — M62.838 MUSCLE SPASM: Status: ACTIVE | Noted: 2022-01-12

## 2022-01-12 PROBLEM — R10.2 PELVIC PAIN: Status: ACTIVE | Noted: 2022-01-12

## 2022-01-12 PROBLEM — M62.89 PELVIC FLOOR DYSFUNCTION: Status: ACTIVE | Noted: 2022-01-12

## 2022-01-12 NOTE — PLAN OF CARE
"[facility]  Pelvic Health Physical Therapy Initial Evaluation    Date: 1/11/2022   Name: Geetha Meyers  Clinic Number: [id number]  Therapy Diagnosis:   Encounter Diagnoses   Name Primary?    Muscle spasm     Pelvic floor dysfunction     Pelvic pain      Physician: Tabatha, Provider    Physician Orders: Pelvic PT Eval and Treat  Medical Diagnosis from Referral: Periodic health assessment, Pap and pelvic [Z01.419]  Evaluation Date: 1/11/2022  Authorization Period Expiration: 12/31/22  Plan of Care Expiration: 5/3/22  Visit # / Visits authorized: 1/ 1    Time In: 5:00p  Time Out: 6:00p  Total Appointment Time (timed & untimed codes): 60 minutes    Precautions: universal    Subjective     Date of onset: over a year    History of current condition - Geetha reports: She has chronic pelvic pain. Complicated history of ER visits and scans led to multiple surgeries including a D&C and an ablation. Most recent surgery in August to remove L fallopian tube and ovary due to swelling. Plans to have hysterectomy soon, unsure if they are going to keep R ovary. Pelvic pain is constant, worse on the L side. Feels as if her limbs are "pulling" at the hip joint and feels like her organs may have shifted after the surgery. Something just doesn't feel right in the abdomen. Describes a deep ache in the anterolateral hip. Pain with intercourse that feels like cramping and pressure anterior to the tailbone. Hx of RA and fibromyalgia as well as painful periods. Her various medications leave her in a cycle of diarrhea and constipation that is difficult to manage. Had urinary leakage in the past but this has resolved over the last few weeks. Receiving OP PT at [facility] for general strengthening and management of her other conditions. 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 Frequent cycle of UTIs and vaginal infections is also an issue - feels like she cannot fully empty her " bladder and is frequently on antibiotics.    OB/GYN History: painful periods  Sexually active? Yes  Pain with vaginal exams, intercourse or tampon use? Yes right at the tailbone cramping and pressure    Bladder/Bowel History:   Frequency of urination:   Daytime: several times, sometimes more than once an hour          Nighttime: 2-3 times a night  Difficulty initiating urine stream: Yes sometimes will have hesitancy  Urine stream: weak and sometimes trickly, splayed sometimes  Complete emptying: No  Bladder leakage: No  Frequency of incidents: not right now  Amount leaked (urine): n/a  Urinary Urgency: Yes  Able to delay the urge for at least 0 minute(s).  Frequency of bowel movements: once a day  Difficulty initiating BM: sometimes  Quality/Shape of BM: inconsistent  Does Patient Feel Empty after BM? No, feels like a blockage  Fiber Supplements or Laxative Use?  No  Colon leakage: No  Form of protection: none  Number of pads required in 24 hours: used to wear pantiliners, no longer needs them    Prior Therapy/Previous treatment included: PT for RA/fibromyalgia and general conditioning  Social History: lives with their family  Current exercise: has a treadmill at home, uses it frequently, doing PT exercises  Occupation: Pt works as a convention coordinator and job-related duties include very active.  Prior Level of Function: independent  Current Level of Function: impaired bowel and bladder function, frequent UTIs and vaginal infections,     Types of fluid intake: water  Diet: Traditional  Habitus:well developed, well nourished  Abuse/Neglect: No     PAIN:  Location: pelvis    Description: Cramping, aching, grabbing, deep, nagging  Aggravating Factors/Activities that cause symptoms: Bowel movement , Walking, Stairs and General movement    Easing Factors: nothing     Medical History: Geetha  has a past medical history of Fibromyalgia, HTN (hypertension), Hyperlipidemia, Lower leg edema, Menorrhagia, Obesity,  Prediabetes, and Rheumatoid arthritis.     Surgical History:  Geetha Meyers  has a past surgical history that includes Endometrial ablation.    Medications: Geetha has a current medication list which includes the following prescription(s): adalimumab, amitriptyline, atorvastatin, cetirizine, cyclobenzaprine, duloxetine, ergocalciferol, famciclovir, hydrochlorothiazide, levalbuterol, levocetirizine, omeprazole, and prednisone.    Allergies:   Review of patient's allergies indicates:   Allergen Reactions    Bactrim [sulfamethoxazole-trimethoprim]      Aseptic meningitis    Mold Diarrhea, Hives, Itching, Nausea And Vomiting, Rash, Shortness Of Breath and Swelling    Mushroom Anaphylaxis, Diarrhea, Edema, Hives, Itching, Nausea And Vomiting, Rash and Swelling    Sulfasalazine      Orange skin,nausea,diarrhea,h/a,ringing in the ears    Sulfa (sulfonamide antibiotics)     Wool Hives, Itching, Nausea Only, Rash and Swelling        Imaging See EMR for full imaging workup    Pts goals: to relieve pain and normalize control of bladder and bowel function    OBJECTIVE     See EMR under MEDIA for written consent provided 1/11/2022  Chaperone: declined    ORTHO SCREEN  Posture in sitting: WNL  Posture in standing: WNL  Pelvic alignment: Not assessed today       BREATHING MECHANICS ASSESSMENT   Thorax Assessment During Quiet Respiration: WNL excursion of ribcage and WNL excursion of abdominal wall  Thorax Assessment During Deep Respiration: WNL excursion of ribcage and WNL excursion of abdominal wall    VAGINAL PELVIC FLOOR EXAM    EXTERNAL ASSESSMENT  Introitus: WNL  Skin condition: WNL  Scarring: none   Sensation: WNL   Pain:  none  Voluntary contraction: visible lift  Voluntary relaxation: visible drop  Involuntary contraction: nil  Bearing down: bulge  Perineal descent: absent  Comments:        INTERNAL ASSESSMENT  Pain: trigger points as follows: B levator ani L>R, significant tension noted at B OI but minimal pain   Sensation:  numbness noted at B OI   Vaginal vault: roomy   Muscle Bulk: hypertonus   Muscle Power: 3/5  Muscle Endurance: 3 sec  # Reps To Fatigue: not tested    Fast Contractions in 10 seconds: not tested     Quality of contraction: WNL   Specificity: WNL   Coordination: WNL   Prolapse check:none  Comments:  Significant hypertonus with sensory loss at B OI  TREATMENT     Treatment Time In: 5:45  Treatment Time Out: 6:00  Total Treatment time (time-based codes) separate from Evaluation: 15 minutes    Therapeutic Activity Patient participated in dynamic functional therapeutic activities to improve functional performance for 15 minutes. Including: Education as described below.     Patient Education provided:   general anatomy/physiology of urinary/ bowel  system, benefits of treatment, risks of treatment and alternative methods of treatment were discussed with the pt. Additionally, anatomy/physiology of pelvic floor, pelvic wand use and BM mechanics were reviewed.     Home Exercises Provided:  Written Home Exercises Provided: yes.  Exercises were reviewed and Geetha was able to demonstrate them prior to the end of the session.    Geetha demonstrated good  understanding of the education provided.     See EMR under Patient Instructions for exercises provided 1/11/2022.    Assessment     Geetha is a 41 y.o. female referred to outpatient Physical Therapy with a medical diagnosis of Periodic health assessment, Pap and pelvic [Z01.419]. Pt presents with pelvic floor tenderness, decreased pelvic muscle strength, decreased endurance of the pelvic muscles, increased tension of the pelvic muscles, increased frequency of urination, increased nocturia, incomplete urination, dysfunctional voiding, dysfunctional defecation, chronic UTI due to dysfunctional voiding and pelvic pain.     Pt prognosis is Good.   Pt will benefit from skilled outpatient Physical Therapy to address the deficits stated above and in the chart below, provide pt/family  education, and to maximize pt's level of independence.     Plan of care discussed with patient: Yes  Pt's spiritual, cultural and educational needs considered and patient is agreeable to the plan of care and goals as stated below:       Anticipated Barriers for therapy: none    Medical Necessity is demonstrated by the following    History  Co-morbidities and personal factors that may impact the plan of care Co-morbidities:   coping style/mechanism, depression, high BMI, HTN, immunosuppression, prior abdominal surgery, prior pelvic surgery, recurrent urinary infections and polypharmacy, fibromyalgia, RA    Personal Factors:   coping style     high   Examination  Body Structures and Functions, activity limitations and participation restrictions that may impact the plan of care Body Regions/Systems/Functions:  pelvic floor tenderness, decreased pelvic muscle strength, decreased endurance of the pelvic muscles, increased tension of the pelvic muscles, poor quality of pelvic muscle contraction, increased frequency of urination, increased nocturia, incomplete urination, dysfunctional voiding, dysfunctional defecation, chronic UTI due to dysfunctional voiding and pelvic pain     Activity Limitations:  bearing down for BM, initiating a BM, full bladder emptying, intercourse/vaginal exam/tampon use without pain, Pain with ADLs and Participating in social activities and ADLs due to pelvic pressure    Participation Restrictions:  social activities with friends/family, relationship with spouse/partner, ADL participation affected by pain, regularly having a comfortable BM, Sleep restrictions, Pelvic pressure with ADLs.  and exercise restrictions due to pain     Activity limitations:   Learning and applying knowledge  no deficits    General Tasks and Commands  no deficits    Communication  no deficits    Mobility  no deficits    Self care  no deficits    Domestic Life  no deficits    Interactions/Relationships  no  deficits    Life Areas  no deficits    Community and Social Life  no deficits       high   Clinical Presentation unstable clinical presentation with unpredictable characteristics high   Decision Making/ Complexity Score: high       Short Term Goals: 4 weeks  Pt will verbalize improved awareness of PFM activity as palpated by PT in order to improve activity involvement with HEP.  Pt will urinate without crede/Valsalva to prevent adverse effects to adjacent structures.  Pt will be independent with double voiding techniques 100% of the time to ensure full bladder emptying and decrease pt's risk of infection.   Pt will report improved ability to tolerate standing > or = 30 minutes with < or = 2/10 pain for improved ability to complete work duties.  Pt will report improved ability to tolerate sitting > or = 30 minutes with < or = 2/10 pain for improved ability to complete work duties.   Pt to demonstrate proper diaphragmatic breathing to help with calming the nervous system in order to decrease pain and to improve abdominal wall musculature extensibility.    Pt will report minimal to no pain with single digit pelvic assessment and display relaxation during this assessment in order to progress to dilator/wand use.  Pt to report a decrease in urinary frequency to no more than once every 90 minutes to improve ability to participate in social activities.     Long Term Goals: 12 weeks   Pt to be discharged with home plan for carry over after discharge.   Pt will be trained and compliant with postural strategies in sitting and standing to improve alignment and decrease pain and muscle fatigue  Pt to report being able to complete a full day at work without significant increase in pain to demonstrate a return towards prior level of function.  Pt to report improved restorative sleeping, waking up no more than 1x/night from pain or urgency.  Pt to report being able to have a comfortable vaginal exam without significant increase in  pelvic pain/discomfort.  Pt to report a decrease in pain to no more than 3/10 at it's worst with intercourse.    Pt will tolerate a medium sized dilator and/or pelvic wand in order to improve access to women's healthcare and allow for more comfortable manual treatment.     Plan     Plan of care Certification: 1/11/2022 to 5/3/22.    Outpatient Physical Therapy 1 times weekly for 12 weeks to include the following interventions: therapeutic exercises, therapeutic activity, neuromuscular re-education, manual therapy, modalities PRN, patient/family education, dry needling, and self care/home management    I appreciate your consult and look forward to participating in this patient's care.    Yadi Miranda, PT, DPT

## 2022-02-23 DIAGNOSIS — D84.9 IMMUNOSUPPRESSED STATUS: ICD-10-CM

## 2022-03-08 ENCOUNTER — CLINICAL SUPPORT (OUTPATIENT)
Dept: REHABILITATION | Facility: HOSPITAL | Age: 42
End: 2022-03-08
Payer: MEDICAID

## 2022-03-08 DIAGNOSIS — M62.89 PELVIC FLOOR DYSFUNCTION: ICD-10-CM

## 2022-03-08 DIAGNOSIS — M62.838 MUSCLE SPASM: ICD-10-CM

## 2022-03-08 DIAGNOSIS — R10.2 PELVIC PAIN: ICD-10-CM

## 2022-03-08 PROCEDURE — 97530 THERAPEUTIC ACTIVITIES: CPT | Mod: PO

## 2022-03-08 NOTE — PATIENT INSTRUCTIONS
TRIGGER POINT RELEASE WITH PELVIC WAND  1. Make sure the wand is clean, free of any visible soiling.  2. Lay back comfortably with your back well supported by several pillows and both knees bent.   3. Apply water-based lubricant (ie. Slippery Stuff) on the end of the wand AND vaginal opening.  4. Grab the end of the wand with one hand, and spread your labia with the other hand to visualize the entrance of the vagina; insert the wand.   5. Gently palpate your pelvic floor muscles with the wand for trigger points/muscle spasms.     To find the pelvic floor muscles:   - In the middle at 6 o'clock = Rectum   - Move a little to the right at 5 o'clock = Right levator ani muscle    - Move a little to the left at 7 o'clock = Left levator ani muscle    - A little more to the right at 3/4 o'clock = Right obturator internus muscle   - A little more to the left at 8/9 o'clock = Left obturator internus muscle   - If you feel sharp, stabbing pain = bone or pudendal nerve (wrong)    6. When you find a sensitive, painful spot (a knot/bump in your muscle), apply constant pressure TO YOUR TOLERANCE.   *Do not apply so much pressure that you cannot tolerate it, your muscles start tightening up, or it leaves you too painful to do again the following day.    7. You may start to feel a pulse, throbbing, or light burning sensations; keep your pressure constant!  8. Hold this until the muscle spasm has diminished and the pain has improved. It may take a few minutes. You can gently massage the muscle in small circular motions.  9. Focus on Diaphragmatic Breathing and DROPPING your pelvic floor muscle (releasing the tension).    10. Once finished, remove wand.  12. Wash with anti-bacterial soap and thoroughly rinse. Let air dry whenever possible. Store in a dry, clean location.   13. Perform for 10-15 minutes, every other day or every day if needed.    *Don't apply so much pressure that it leaves you too sore to perform again the next  day.    STOP if there is increased bleeding, an infection, or extreme pain.

## 2022-03-08 NOTE — PROGRESS NOTES
"  Pelvic Health Physical Therapy   Treatment/Progress Note     Name: Geetha Meyers  Clinic Number: [id number]    Therapy Diagnosis:   Encounter Diagnoses   Name Primary?    Muscle spasm     Pelvic floor dysfunction     Pelvic pain      Physician: Clive Mays    Visit Date: 3/8/2022    Physician Orders: Pelvic PT Eval and Treat  Medical Diagnosis from Referral: Periodic health assessment, Pap and pelvic [Z01.419]  Evaluation Date: 1/11/2022  Authorization Period Expiration: 5/17/22  Plan of Care Expiration: 5/3/22  Visit # / Visits authorized: 1/ 12  Cancelled Visits: 3  No Show Visits: 0    Time In: 3:00p  Time Out: 3:45p  Total Billable Time: 45 minutes    Precautions: Standard    Subjective     Pt reports: she is in a lot of pain today, "pulling tearing and numbness". With walking she'll get sharp stabbing pain in the L leg. R leg crampy as well. The pain brought her to the ER last Tuesday. It has not been a great few weeks, physically.   She was compliant with home exercise program.  Response to previous treatment: none  Functional change: ongoing    Pain: yes, intense  Location: B legs, L>R    Constitutional Symptoms Review: The patient denies having any constitutional symptoms.     Objective   Pt verbally consents to treatment today.  Signed consent form already on file.     Geetha participated in dynamic functional therapeutic activities to improve functional performance for 45  minutes, including:  Pelvic wand training with real time instructions and feedback. Dosing schedule every day to every other day as needed. Discussed using TENS unit PRN for pain in legs, back, hips      Home Exercises Provided and Patient Education Provided     Education provided:   - pelvic wand use and TENS units  Discussed progression of plan of care with patient; educated pt in activity modification; reviewed HEP with pt. Pt demonstrated and verbalized understanding of all instruction and was provided with a handout of HEP " (see Patient Instructions).    Written Home Exercises Provided: yes.  Exercises were reviewed and Geetha was able to demonstrate them prior to the end of the session.  Geetha demonstrated good  understanding of the education provided.     See EMR under Patient Instructions for exercises provided 3/8/2022.    Assessment     Geetha tolerated treatment well and demonstrated understanding of all education provided at today's visit. She noted some TTP with the wand bilaterally, and reports feeling confident using the wand at home. Dosing schedule every day to every other day. Goals assessed today - note that today is first visit from al.    Geetha Is progressing well towards her goals.   Pt prognosis is Good.     Pt will continue to benefit from skilled outpatient physical therapy to address the deficits listed in the problem list box on initial evaluation, provide pt/family education and to maximize pt's level of independence in the home and community environment.     Pt's spiritual, cultural and educational needs considered and pt agreeable to plan of care and goals.     Anticipated barriers to physical therapy: none    Short Term Goals: 4 weeks  Pt will verbalize improved awareness of PFM activity as palpated by PT in order to improve activity involvement with HEP. PROGRESSING 3/8  Pt will urinate without crede/Valsalva to prevent adverse effects to adjacent structures. PROGRESSING 3/8  Pt will be independent with double voiding techniques 100% of the time to ensure full bladder emptying and decrease pt's risk of infection. PROGRESSING 3/8  Pt will report improved ability to tolerate standing > or = 30 minutes with < or = 2/10 pain for improved ability to complete work duties. PROGRESSING 3/8  Pt will report improved ability to tolerate sitting > or = 30 minutes with < or = 2/10 pain for improved ability to complete work duties. PROGRESSING 3/8  Pt to demonstrate proper diaphragmatic breathing to help with calming the  nervous system in order to decrease pain and to improve abdominal wall musculature extensibility.  PROGRESSING 3/8  Pt will report minimal to no pain with single digit pelvic assessment and display relaxation during this assessment in order to progress to dilator/wand use. PROGRESSING 3/8  Pt to report a decrease in urinary frequency to no more than once every 90 minutes to improve ability to participate in social activities. PROGRESSING 3/8     Long Term Goals: 12 weeks   Pt to be discharged with home plan for carry over after discharge.   Pt will be trained and compliant with postural strategies in sitting and standing to improve alignment and decrease pain and muscle fatigue  Pt to report being able to complete a full day at work without significant increase in pain to demonstrate a return towards prior level of function.  Pt to report improved restorative sleeping, waking up no more than 1x/night from pain or urgency.  Pt to report being able to have a comfortable vaginal exam without significant increase in pelvic pain/discomfort.  Pt to report a decrease in pain to no more than 3/10 at it's worst with intercourse.    Pt will tolerate a medium sized dilator and/or pelvic wand in order to improve access to women's healthcare and allow for more comfortable manual treatment.     Plan     Continue per established POC.    Yadi Miranda, PT , DPT

## 2022-04-13 ENCOUNTER — CLINICAL SUPPORT (OUTPATIENT)
Dept: REHABILITATION | Facility: HOSPITAL | Age: 42
End: 2022-04-13
Payer: MEDICAID

## 2022-04-13 DIAGNOSIS — M62.838 MUSCLE SPASM: ICD-10-CM

## 2022-04-13 DIAGNOSIS — R10.2 PELVIC PAIN: ICD-10-CM

## 2022-04-13 DIAGNOSIS — M62.89 PELVIC FLOOR DYSFUNCTION: ICD-10-CM

## 2022-04-13 PROCEDURE — 97530 THERAPEUTIC ACTIVITIES: CPT | Mod: PO

## 2022-04-13 PROCEDURE — 97140 MANUAL THERAPY 1/> REGIONS: CPT | Mod: PO

## 2022-04-13 NOTE — PROGRESS NOTES
Pelvic Health Physical Therapy   Treatment/Progress Note     Name: Geetha Meyers  Clinic Number: [id number]    Therapy Diagnosis:   Encounter Diagnoses   Name Primary?    Muscle spasm     Pelvic floor dysfunction     Pelvic pain      Physician: Clive Mays    Visit Date: 4/13/2022    Physician Orders: Pelvic PT Eval and Treat  Medical Diagnosis from Referral: Periodic health assessment, Pap and pelvic [Z01.419]  Evaluation Date: 1/11/2022  Authorization Period Expiration: 5/17/22  Plan of Care Expiration: 5/3/22  Visit # / Visits authorized: 2/ 12  Cancelled Visits: 3  No Show Visits: 0    Time In: 9:05a  Time Out: 10:05a  Total Billable Time: 60 minutes    Precautions: Standard    Subjective     Pt reports: She is frustrated lately regarding her upcoming hysterectomy. She has been in lots of pain and nothing has really helped.    She was compliant with home exercise program.  Response to previous treatment: none  Functional change: ongoing    Pain: 4/10  Location: pelvis, suprapubic    Constitutional Symptoms Review: The patient denies having any constitutional symptoms.     Objective   Pt verbally consents to treatment today.  Signed consent form already on file.     Geetha participated in dynamic functional therapeutic activities to improve functional performance for 25  minutes, including: discussed symptoms, POC progression, HEP updates     Geetha received the following manual therapy techniques: to develop flexibility, extensibility and desensitization for 35 minutes including: cupping of entirety of abdomen and suprapubic region    Home Exercises Provided and Patient Education Provided     Education provided:   - continue pelvic wand use   Discussed progression of plan of care with patient; educated pt in activity modification; reviewed HEP with pt. Pt demonstrated and verbalized understanding of all instruction and was provided with a handout of HEP (see Patient Instructions).    Written Home  Exercises Provided: yes.  Exercises were reviewed and Geetha was able to demonstrate them prior to the end of the session.  Geetha demonstrated good  understanding of the education provided.     See EMR under Patient Instructions for exercises provided 3/8/2022.    Assessment     Geetha tolerated treatment well and demonstrated understanding of all education provided at today's visit. She noted some relief after treatment. Goals assessed today.    Geetha Is progressing well towards her goals.   Pt prognosis is Good.     Pt will continue to benefit from skilled outpatient physical therapy to address the deficits listed in the problem list box on initial evaluation, provide pt/family education and to maximize pt's level of independence in the home and community environment.     Pt's spiritual, cultural and educational needs considered and pt agreeable to plan of care and goals.     Anticipated barriers to physical therapy: none    Short Term Goals: 4 weeks  Pt will verbalize improved awareness of PFM activity as palpated by PT in order to improve activity involvement with HEP. PROGRESSING 4/13  Pt will urinate without crede/Valsalva to prevent adverse effects to adjacent structures. PROGRESSING 4/13  Pt will be independent with double voiding techniques 100% of the time to ensure full bladder emptying and decrease pt's risk of infection. PROGRESSING 4/13  Pt will report improved ability to tolerate standing > or = 30 minutes with < or = 2/10 pain for improved ability to complete work duties. PROGRESSING 4/13  Pt will report improved ability to tolerate sitting > or = 30 minutes with < or = 2/10 pain for improved ability to complete work duties. PROGRESSING 4/13  Pt to demonstrate proper diaphragmatic breathing to help with calming the nervous system in order to decrease pain and to improve abdominal wall musculature extensibility.  PROGRESSING 4/13  Pt will report minimal to no pain with single digit pelvic assessment and  display relaxation during this assessment in order to progress to dilator/wand use. PROGRESSING 4/13  Pt to report a decrease in urinary frequency to no more than once every 90 minutes to improve ability to participate in social activities. PROGRESSING 4/13     Long Term Goals: 12 weeks   Pt to be discharged with home plan for carry over after discharge.   Pt will be trained and compliant with postural strategies in sitting and standing to improve alignment and decrease pain and muscle fatigue  Pt to report being able to complete a full day at work without significant increase in pain to demonstrate a return towards prior level of function.  Pt to report improved restorative sleeping, waking up no more than 1x/night from pain or urgency.  Pt to report being able to have a comfortable vaginal exam without significant increase in pelvic pain/discomfort.  Pt to report a decrease in pain to no more than 3/10 at it's worst with intercourse.    Pt will tolerate a medium sized dilator and/or pelvic wand in order to improve access to women's healthcare and allow for more comfortable manual treatment.     Plan     Continue per established POC.    Yadi Miranda, PT , DPT

## 2022-04-20 ENCOUNTER — CLINICAL SUPPORT (OUTPATIENT)
Dept: REHABILITATION | Facility: HOSPITAL | Age: 42
End: 2022-04-20
Attending: INTERNAL MEDICINE
Payer: MEDICAID

## 2022-04-20 DIAGNOSIS — M62.838 MUSCLE SPASM: ICD-10-CM

## 2022-04-20 DIAGNOSIS — M62.89 PELVIC FLOOR DYSFUNCTION: ICD-10-CM

## 2022-04-20 DIAGNOSIS — R10.2 PELVIC PAIN: ICD-10-CM

## 2022-04-20 PROCEDURE — 97140 MANUAL THERAPY 1/> REGIONS: CPT | Mod: PO

## 2022-04-20 NOTE — PROGRESS NOTES
Pelvic Health Physical Therapy   Treatment Note     Name: Geetha Meyers  Clinic Number: [id number]    Therapy Diagnosis:   Encounter Diagnoses   Name Primary?    Muscle spasm     Pelvic floor dysfunction     Pelvic pain      Physician: Triston Pitt Jr.,*    Visit Date: 4/20/2022    Physician Orders: Pelvic PT Eval and Treat  Medical Diagnosis from Referral: Periodic health assessment, Pap and pelvic [Z01.419]  Evaluation Date: 1/11/2022  Authorization Period Expiration: 5/17/22  Plan of Care Expiration: 5/3/22  Visit # / Visits authorized: 3/ 12  Cancelled Visits: 3  No Show Visits: 0    Time In: 9:00a  Time Out: 10:00a  Total Billable Time: 60 minutes    Precautions: Standard    Subjective     Pt reports: She feels a lot better today. She feels like her legs are more free and mobile. She feels like the cupping helps. She is able to ignore her pain more. Still using the pelvic wand regularly.    She was compliant with home exercise program.  Response to previous treatment: none  Functional change: ongoing    Pain: 3/10  Location: pelvis, suprapubic    Constitutional Symptoms Review: The patient denies having any constitutional symptoms.     Objective   Pt verbally consents to treatment today.  Signed consent form already on file.     Geetha received the following manual therapy techniques: to develop flexibility, extensibility and desensitization for 60 minutes including: cupping of entirety of abdomen and suprapubic region, inner thighs. MFR to R adductor tendon    Home Exercises Provided and Patient Education Provided     Education provided:   - continue pelvic wand use   Discussed progression of plan of care with patient; educated pt in activity modification; reviewed HEP with pt. Pt demonstrated and verbalized understanding of all instruction and was provided with a handout of HEP (see Patient Instructions).    Written Home Exercises Provided: yes.  Exercises were reviewed and Geetha was able to demonstrate  them prior to the end of the session.  Geetha demonstrated good  understanding of the education provided.     See EMR under Patient Instructions for exercises provided 3/8/2022.    Assessment     Geetha tolerated treatment well and demonstrated understanding of all education provided at today's visit. She noted some relief after treatment. Goals assessed today.    Geetha Is progressing well towards her goals.   Pt prognosis is Good.     Pt will continue to benefit from skilled outpatient physical therapy to address the deficits listed in the problem list box on initial evaluation, provide pt/family education and to maximize pt's level of independence in the home and community environment.     Pt's spiritual, cultural and educational needs considered and pt agreeable to plan of care and goals.     Anticipated barriers to physical therapy: none    Short Term Goals: 4 weeks  Pt will verbalize improved awareness of PFM activity as palpated by PT in order to improve activity involvement with HEP. PROGRESSING 4/13  Pt will urinate without crede/Valsalva to prevent adverse effects to adjacent structures. PROGRESSING 4/13  Pt will be independent with double voiding techniques 100% of the time to ensure full bladder emptying and decrease pt's risk of infection. PROGRESSING 4/13  Pt will report improved ability to tolerate standing > or = 30 minutes with < or = 2/10 pain for improved ability to complete work duties. PROGRESSING 4/13  Pt will report improved ability to tolerate sitting > or = 30 minutes with < or = 2/10 pain for improved ability to complete work duties. PROGRESSING 4/13  Pt to demonstrate proper diaphragmatic breathing to help with calming the nervous system in order to decrease pain and to improve abdominal wall musculature extensibility.  PROGRESSING 4/13  Pt will report minimal to no pain with single digit pelvic assessment and display relaxation during this assessment in order to progress to dilator/wand use.  PROGRESSING 4/13  Pt to report a decrease in urinary frequency to no more than once every 90 minutes to improve ability to participate in social activities. PROGRESSING 4/13     Long Term Goals: 12 weeks   Pt to be discharged with home plan for carry over after discharge.   Pt will be trained and compliant with postural strategies in sitting and standing to improve alignment and decrease pain and muscle fatigue  Pt to report being able to complete a full day at work without significant increase in pain to demonstrate a return towards prior level of function.  Pt to report improved restorative sleeping, waking up no more than 1x/night from pain or urgency.  Pt to report being able to have a comfortable vaginal exam without significant increase in pelvic pain/discomfort.  Pt to report a decrease in pain to no more than 3/10 at it's worst with intercourse.    Pt will tolerate a medium sized dilator and/or pelvic wand in order to improve access to women's healthcare and allow for more comfortable manual treatment.     Plan     Continue per established POC.    Yadi Miranda, PT , DPT

## 2022-05-23 ENCOUNTER — CLINICAL SUPPORT (OUTPATIENT)
Dept: REHABILITATION | Facility: HOSPITAL | Age: 42
End: 2022-05-23
Attending: INTERNAL MEDICINE
Payer: MEDICAID

## 2022-05-23 DIAGNOSIS — M62.838 MUSCLE SPASM: Primary | ICD-10-CM

## 2022-05-23 DIAGNOSIS — M62.89 PELVIC FLOOR DYSFUNCTION: ICD-10-CM

## 2022-05-23 DIAGNOSIS — R10.2 PELVIC PAIN: ICD-10-CM

## 2022-05-23 PROCEDURE — 97140 MANUAL THERAPY 1/> REGIONS: CPT | Mod: PO

## 2022-05-23 NOTE — PROGRESS NOTES
Pelvic Health Physical Therapy   Treatment/Progress Note and updated POC     Name: Geetha Meyers  Clinic Number: [id number]    Therapy Diagnosis:   Encounter Diagnoses   Name Primary?    Muscle spasm Yes    Pelvic floor dysfunction     Pelvic pain      Physician: Triston Pitt Jr.,*    Visit Date: 5/23/2022    Physician Orders: Pelvic PT Eval and Treat  Medical Diagnosis from Referral: Periodic health assessment, Pap and pelvic [Z01.419]  Evaluation Date: 1/11/2022  Authorization Period Expiration: 5/17/22 - extension requested, work queue  Plan of Care Expiration: Extend 12 weeks to 08/15/22  Visit # / Visits authorized: 4/ 12  Cancelled Visits: 3  No Show Visits: 0    Time In: 8:30  Time Out: 9:25  Total Billable Time: 55 minutes    Precautions: Standard    Subjective     Pt reports: She is having a flare-up and having some joint pain. Still having some groin pain. Pelvic pain is improving a lot.  She was compliant with home exercise program.  Response to previous treatment: improvement in pain  Functional change: ongoing    Pain: 6/10  Location: all over    Constitutional Symptoms Review: The patient denies having any constitutional symptoms.     Objective   Pt verbally consents to treatment today.  Signed consent form already on file.     Geetha received the following manual therapy techniques: to develop flexibility, extensibility and desensitization for 55 minutes including: MFR to B levator ani/OI, long axis hip distraction x 3 min each side    Home Exercises Provided and Patient Education Provided     Education provided:   - continue pelvic wand use, add in hip IR stretch with strap  Discussed progression of plan of care with patient; educated pt in activity modification; reviewed HEP with pt. Pt demonstrated and verbalized understanding of all instruction and was provided with a handout of HEP (see Patient Instructions).    Written Home Exercises Provided: yes.  Exercises were reviewed and Geetha was able  to demonstrate them prior to the end of the session.  Geetha demonstrated good  understanding of the education provided.     See EMR under Patient Instructions for exercises provided 3/8/2022.    Assessment     Geetha tolerated treatment well and demonstrated understanding of all education provided at today's visit. She noted some relief after treatment. Pt has not been seen in over a month due to scheduling issues, though she reports compliance with home program. She is also enrolled in PT at an outside facility for joint pain and overall mobility. Goals assessed today.    Geetha Is progressing well towards her goals.   Pt prognosis is Good.     Pt will continue to benefit from skilled outpatient physical therapy to address the deficits listed in the problem list box on initial evaluation, provide pt/family education and to maximize pt's level of independence in the home and community environment.     Pt's spiritual, cultural and educational needs considered and pt agreeable to plan of care and goals.     Anticipated barriers to physical therapy: none    Short Term Goals: 4 weeks  Pt will verbalize improved awareness of PFM activity as palpated by PT in order to improve activity involvement with HEP. MET 5/23  Pt will urinate without crede/Valsalva to prevent adverse effects to adjacent structures. MET 5/23  Pt will be independent with double voiding techniques 100% of the time to ensure full bladder emptying and decrease pt's risk of infection. PROGRESSING 5/23  Pt will report improved ability to tolerate standing > or = 30 minutes with < or = 2/10 pain for improved ability to complete work duties. PROGRESSING 5/23  Pt will report improved ability to tolerate sitting > or = 30 minutes with < or = 2/10 pain for improved ability to complete work duties. PROGRESSING 5/23  Pt to demonstrate proper diaphragmatic breathing to help with calming the nervous system in order to decrease pain and to improve abdominal wall  musculature extensibility.  MET 5/23  Pt will report minimal to no pain with single digit pelvic assessment and display relaxation during this assessment in order to progress to dilator/wand use. MET 5/23  Pt to report a decrease in urinary frequency to no more than once every 90 minutes to improve ability to participate in social activities. PROGRESSING 5/23     Long Term Goals: 12 weeks   Pt to be discharged with home plan for carry over after discharge.   Pt will be trained and compliant with postural strategies in sitting and standing to improve alignment and decrease pain and muscle fatigue  Pt to report being able to complete a full day at work without significant increase in pain to demonstrate a return towards prior level of function.  Pt to report improved restorative sleeping, waking up no more than 1x/night from pain or urgency.  Pt to report being able to have a comfortable vaginal exam without significant increase in pelvic pain/discomfort.  Pt to report a decrease in pain to no more than 3/10 at it's worst with intercourse.    Pt will tolerate a medium sized dilator and/or pelvic wand in order to improve access to women's healthcare and allow for more comfortable manual treatment.     Plan     Continue per established POC. Extend existing POC an additional 12 weeks.    Yadi Miranda, PT , DPT

## 2022-06-07 ENCOUNTER — CLINICAL SUPPORT (OUTPATIENT)
Dept: REHABILITATION | Facility: HOSPITAL | Age: 42
End: 2022-06-07
Attending: INTERNAL MEDICINE
Payer: MEDICAID

## 2022-06-07 DIAGNOSIS — M62.89 PELVIC FLOOR DYSFUNCTION: ICD-10-CM

## 2022-06-07 DIAGNOSIS — R10.2 PELVIC PAIN: ICD-10-CM

## 2022-06-07 DIAGNOSIS — M62.838 MUSCLE SPASM: ICD-10-CM

## 2022-06-07 PROCEDURE — 97530 THERAPEUTIC ACTIVITIES: CPT | Mod: PO

## 2022-06-07 PROCEDURE — 97140 MANUAL THERAPY 1/> REGIONS: CPT | Mod: PO

## 2022-06-07 NOTE — PROGRESS NOTES
Pelvic Health Physical Therapy   Treatment Note      Name: Geetha Meyers  Clinic Number: [id number]    Therapy Diagnosis:   Encounter Diagnoses   Name Primary?    Muscle spasm     Pelvic floor dysfunction     Pelvic pain      Physician: Triston Pitt Jr.,*    Visit Date: 6/7/2022    Physician Orders: Pelvic PT Eval and Treat  Medical Diagnosis from Referral: Periodic health assessment, Pap and pelvic [Z01.419]  Evaluation Date: 1/11/2022  Authorization Period Expiration: 5/17/22 - extension requested, work queue  Plan of Care Expiration: Extend 12 weeks to 08/15/22  Visit # / Visits authorized: 5/ 12  Cancelled Visits: 3  No Show Visits: 0    Time In: 5:00  Time Out: 6:10  Total Billable Time: 70 minutes    Precautions: Standard    Subjective     Pt reports: She is still having significant pain, but it is improving. Used the TENS machine earlier which helped. Achy overused muscle feeling, no longer feeling tearing and pulling sensation.  She was compliant with home exercise program.  Response to previous treatment: improvement in pain  Functional change: ongoing    Pain: 5/10  Location: low back, pelvis    Constitutional Symptoms Review: The patient denies having any constitutional symptoms.     Objective   Pt verbally consents to treatment today.  Signed consent form already on file.     Geetha received the following manual therapy techniques: to develop extensibility and desensitization, pain relief for 25 minutes including: scraping/IASTM to thoracolumbar fascia, sacral region    Geetha participated in dynamic functional therapeutic activities to improve functional performance for 45  minutes, including: reviewed prior concepts and discussed behavioral modifications as well as exercise modifications    Home Exercises Provided and Patient Education Provided     Education provided:   - continue pelvic wand use, add in hip IR stretch with strap  Discussed progression of plan of care with patient; educated pt in  activity modification; reviewed HEP with pt. Pt demonstrated and verbalized understanding of all instruction and was provided with a handout of HEP (see Patient Instructions).    Written Home Exercises Provided: yes.  Exercises were reviewed and Geetha was able to demonstrate them prior to the end of the session.  Geetha demonstrated good  understanding of the education provided.     See EMR under Patient Instructions for exercises provided 3/8/2022.    Assessment     Geetha tolerated treatment well and demonstrated understanding of all education provided at today's visit. She noted some relief after treatment. She expressed hesitation regarding hysterectomy/pelvic surgery options, states that she's in the process of finding a new physician for a second opinion. I provided her with some names for [facility] physicians.    Geetha Is progressing well towards her goals.   Pt prognosis is Good.     Pt will continue to benefit from skilled outpatient physical therapy to address the deficits listed in the problem list box on initial evaluation, provide pt/family education and to maximize pt's level of independence in the home and community environment.     Pt's spiritual, cultural and educational needs considered and pt agreeable to plan of care and goals.     Anticipated barriers to physical therapy: none    Short Term Goals: 4 weeks  Pt will verbalize improved awareness of PFM activity as palpated by PT in order to improve activity involvement with HEP. MET 5/23  Pt will urinate without crede/Valsalva to prevent adverse effects to adjacent structures. MET 5/23  Pt will be independent with double voiding techniques 100% of the time to ensure full bladder emptying and decrease pt's risk of infection. PROGRESSING 5/23  Pt will report improved ability to tolerate standing > or = 30 minutes with < or = 2/10 pain for improved ability to complete work duties. PROGRESSING 5/23  Pt will report improved ability to tolerate sitting > or = 30  minutes with < or = 2/10 pain for improved ability to complete work duties. PROGRESSING 5/23  Pt to demonstrate proper diaphragmatic breathing to help with calming the nervous system in order to decrease pain and to improve abdominal wall musculature extensibility.  MET 5/23  Pt will report minimal to no pain with single digit pelvic assessment and display relaxation during this assessment in order to progress to dilator/wand use. MET 5/23  Pt to report a decrease in urinary frequency to no more than once every 90 minutes to improve ability to participate in social activities. PROGRESSING 5/23     Long Term Goals: 12 weeks   Pt to be discharged with home plan for carry over after discharge.   Pt will be trained and compliant with postural strategies in sitting and standing to improve alignment and decrease pain and muscle fatigue  Pt to report being able to complete a full day at work without significant increase in pain to demonstrate a return towards prior level of function.  Pt to report improved restorative sleeping, waking up no more than 1x/night from pain or urgency.  Pt to report being able to have a comfortable vaginal exam without significant increase in pelvic pain/discomfort.  Pt to report a decrease in pain to no more than 3/10 at it's worst with intercourse.    Pt will tolerate a medium sized dilator and/or pelvic wand in order to improve access to women's healthcare and allow for more comfortable manual treatment.     Plan     Continue per established POC.     Yadi Miranda, PT , DPT

## 2022-06-20 ENCOUNTER — PATIENT MESSAGE (OUTPATIENT)
Dept: REHABILITATION | Facility: HOSPITAL | Age: 42
End: 2022-06-20
Payer: MEDICAID

## 2022-11-10 ENCOUNTER — TELEPHONE (OUTPATIENT)
Dept: GYNECOLOGIC ONCOLOGY | Facility: CLINIC | Age: 42
End: 2022-11-10
Payer: COMMERCIAL

## 2022-11-10 NOTE — TELEPHONE ENCOUNTER
Spoke to pt regarding her upcoming appointment with Dr. Higginbotham on 11/14/2022. Pt states she was referred to Dr. Higginbotham by her GYN provider for pelvic pain and elevated CA-125 and to also consult on a Hyst. Pt did not know what her elevated CA-125 numbers were. She was informed we will need records from her referring provider, once those are received Dr. Higginbotham will review and determined if needs the see the patient or another GYN, pt was given my email. She was also informed that her appointment will be cancelled on Monday November 14th until said records are received and reviewed by the provider. Pt verbalized understanding.    Pt was offered an appointment with one of our GYN providers for pelvic pain, pt declined stating she wants to see Dr. Higginbotham as referred.

## 2022-11-11 ENCOUNTER — TELEPHONE (OUTPATIENT)
Dept: GYNECOLOGIC ONCOLOGY | Facility: CLINIC | Age: 42
End: 2022-11-11
Payer: COMMERCIAL

## 2022-11-11 NOTE — TELEPHONE ENCOUNTER
"----- Message from Ngozi Jean-Baptiste MA sent at 11/11/2022  8:26 AM CST -----  Regarding: reschedule  Rashawn Martinez, this patient was last seen at main campus. Can you please reschedule this appointment for me.   Please advise, thank you.   ----- Message -----  From: Missael Black  Sent: 11/10/2022   4:28 PM CST  To: [facility] Gyn Onc Clinical Support    Reschedule Existing Appointment        Appt Date: 11/14    Type of appt: NP - hysterectomy second opinion(pelvic pain)    Physician: Neftaly    Reason for rescheduling? Requesting to r/s appt w/ Dr. Sauceda    Caller: Self    Contact Preference?: [phone] (Mobile)            Additional Information:  "Thank you for all that you do for our patients"         "

## 2022-11-11 NOTE — TELEPHONE ENCOUNTER
Spoke to pt, she was informed that her records were received and reviewed. Pt was informed Dr. Higginbotham feels scheduling an appointment with him would be inappropriate as the patient does not have a confirmed cancer diagnosis. Her upcoming appointment was cancelled.  Pt states her referral was supposed to be with Dr. Sauceda. She was informed that Dr. Sauceda was out of the office until Monday November 14th and I will speak with him and have him review her records. Once he does, I will notify her. Pt verbalized understanding.    Attempted to contact Dr. Saba Arreguin office at [facility] at [phone] and per the recording, the office closes at noon on Friday's

## 2023-01-31 ENCOUNTER — OFFICE VISIT (OUTPATIENT)
Dept: URGENT CARE | Facility: CLINIC | Age: 43
End: 2023-01-31
Payer: COMMERCIAL

## 2023-01-31 VITALS
WEIGHT: 293 LBS | HEART RATE: 89 BPM | SYSTOLIC BLOOD PRESSURE: 123 MMHG | DIASTOLIC BLOOD PRESSURE: 83 MMHG | OXYGEN SATURATION: 97 % | BODY MASS INDEX: 51.91 KG/M2 | RESPIRATION RATE: 18 BRPM | TEMPERATURE: 98 F | HEIGHT: 63 IN

## 2023-01-31 DIAGNOSIS — J06.9 VIRAL URI WITH COUGH: ICD-10-CM

## 2023-01-31 DIAGNOSIS — R05.1 ACUTE COUGH: Primary | ICD-10-CM

## 2023-01-31 DIAGNOSIS — Z90.710 HISTORY OF HYSTERECTOMY: ICD-10-CM

## 2023-01-31 DIAGNOSIS — R07.89 ATYPICAL CHEST PAIN: ICD-10-CM

## 2023-01-31 LAB
CTP QC/QA: YES
SARS-COV-2 AG RESP QL IA.RAPID: NEGATIVE

## 2023-01-31 PROCEDURE — 1160F PR REVIEW ALL MEDS BY PRESCRIBER/CLIN PHARMACIST DOCUMENTED: ICD-10-PCS | Mod: CPTII,S$GLB,, | Performed by: FAMILY MEDICINE

## 2023-01-31 PROCEDURE — 3074F SYST BP LT 130 MM HG: CPT | Mod: CPTII,S$GLB,, | Performed by: FAMILY MEDICINE

## 2023-01-31 PROCEDURE — 93010 EKG 12-LEAD: ICD-10-PCS | Mod: S$GLB,,, | Performed by: INTERNAL MEDICINE

## 2023-01-31 PROCEDURE — 1160F RVW MEDS BY RX/DR IN RCRD: CPT | Mod: CPTII,S$GLB,, | Performed by: FAMILY MEDICINE

## 2023-01-31 PROCEDURE — 87811 SARS CORONAVIRUS 2 ANTIGEN POCT, MANUAL READ: ICD-10-PCS | Mod: QW,S$GLB,, | Performed by: FAMILY MEDICINE

## 2023-01-31 PROCEDURE — 3008F PR BODY MASS INDEX (BMI) DOCUMENTED: ICD-10-PCS | Mod: CPTII,S$GLB,, | Performed by: FAMILY MEDICINE

## 2023-01-31 PROCEDURE — 3079F DIAST BP 80-89 MM HG: CPT | Mod: CPTII,S$GLB,, | Performed by: FAMILY MEDICINE

## 2023-01-31 PROCEDURE — 3079F PR MOST RECENT DIASTOLIC BLOOD PRESSURE 80-89 MM HG: ICD-10-PCS | Mod: CPTII,S$GLB,, | Performed by: FAMILY MEDICINE

## 2023-01-31 PROCEDURE — 3074F PR MOST RECENT SYSTOLIC BLOOD PRESSURE < 130 MM HG: ICD-10-PCS | Mod: CPTII,S$GLB,, | Performed by: FAMILY MEDICINE

## 2023-01-31 PROCEDURE — 1159F MED LIST DOCD IN RCRD: CPT | Mod: CPTII,S$GLB,, | Performed by: FAMILY MEDICINE

## 2023-01-31 PROCEDURE — 93010 ELECTROCARDIOGRAM REPORT: CPT | Mod: S$GLB,,, | Performed by: INTERNAL MEDICINE

## 2023-01-31 PROCEDURE — 99214 PR OFFICE/OUTPT VISIT, EST, LEVL IV, 30-39 MIN: ICD-10-PCS | Mod: S$GLB,,, | Performed by: FAMILY MEDICINE

## 2023-01-31 PROCEDURE — 93005 ELECTROCARDIOGRAM TRACING: CPT | Mod: S$GLB,,, | Performed by: FAMILY MEDICINE

## 2023-01-31 PROCEDURE — 87811 SARS-COV-2 COVID19 W/OPTIC: CPT | Mod: QW,S$GLB,, | Performed by: FAMILY MEDICINE

## 2023-01-31 PROCEDURE — 71046 XR CHEST PA AND LATERAL: ICD-10-PCS | Mod: FY,S$GLB,, | Performed by: RADIOLOGY

## 2023-01-31 PROCEDURE — 1159F PR MEDICATION LIST DOCUMENTED IN MEDICAL RECORD: ICD-10-PCS | Mod: CPTII,S$GLB,, | Performed by: FAMILY MEDICINE

## 2023-01-31 PROCEDURE — 93005 EKG 12-LEAD: ICD-10-PCS | Mod: S$GLB,,, | Performed by: FAMILY MEDICINE

## 2023-01-31 PROCEDURE — 71046 X-RAY EXAM CHEST 2 VIEWS: CPT | Mod: FY,S$GLB,, | Performed by: RADIOLOGY

## 2023-01-31 PROCEDURE — 3008F BODY MASS INDEX DOCD: CPT | Mod: CPTII,S$GLB,, | Performed by: FAMILY MEDICINE

## 2023-01-31 PROCEDURE — 99214 OFFICE O/P EST MOD 30 MIN: CPT | Mod: S$GLB,,, | Performed by: FAMILY MEDICINE

## 2023-01-31 RX ORDER — ACETAMINOPHEN 500 MG
1000 TABLET ORAL EVERY 6 HOURS
COMMUNITY
Start: 2022-12-15 | End: 2023-03-14 | Stop reason: SDUPTHER

## 2023-01-31 RX ORDER — ERGOCALCIFEROL (VITAMIN D2) 10 MCG
TABLET ORAL
COMMUNITY
End: 2023-03-14 | Stop reason: SDUPTHER

## 2023-01-31 RX ORDER — FLUCONAZOLE 150 MG/1
150 TABLET ORAL ONCE
COMMUNITY
Start: 2022-09-20 | End: 2023-03-14

## 2023-01-31 RX ORDER — FUROSEMIDE 20 MG/1
20 TABLET ORAL
COMMUNITY
Start: 2023-01-30 | End: 2024-02-29

## 2023-01-31 RX ORDER — PREGABALIN 75 MG/1
75 CAPSULE ORAL 2 TIMES DAILY
COMMUNITY
Start: 2022-11-21 | End: 2023-03-14 | Stop reason: SDUPTHER

## 2023-01-31 RX ORDER — PROMETHAZINE HYDROCHLORIDE AND DEXTROMETHORPHAN HYDROBROMIDE 6.25; 15 MG/5ML; MG/5ML
5 SYRUP ORAL EVERY 4 HOURS PRN
Qty: 240 ML | Refills: 0 | Status: SHIPPED | OUTPATIENT
Start: 2023-01-31 | End: 2023-02-10

## 2023-01-31 RX ORDER — SPIRONOLACTONE 25 MG/1
TABLET ORAL
COMMUNITY
End: 2024-02-29

## 2023-01-31 RX ORDER — HYDROXYCHLOROQUINE SULFATE 200 MG/1
TABLET, FILM COATED ORAL
COMMUNITY
Start: 2023-01-30 | End: 2024-01-02

## 2023-01-31 RX ORDER — METHOTREXATE 2.5 MG/1
15 TABLET ORAL
COMMUNITY
Start: 2022-09-21 | End: 2024-01-02

## 2023-01-31 RX ORDER — ONDANSETRON 4 MG/1
4 TABLET, FILM COATED ORAL EVERY 4 HOURS PRN
COMMUNITY
Start: 2023-01-10

## 2023-01-31 RX ORDER — DICLOFENAC SODIUM 10 MG/G
GEL TOPICAL
COMMUNITY
End: 2023-03-14

## 2023-01-31 RX ORDER — METFORMIN HYDROCHLORIDE 500 MG/1
500 TABLET, EXTENDED RELEASE ORAL
COMMUNITY
Start: 2022-11-21 | End: 2024-01-02

## 2023-01-31 RX ORDER — AMLODIPINE BESYLATE 5 MG/1
5 TABLET ORAL
COMMUNITY
Start: 2022-11-17

## 2023-01-31 RX ORDER — FLUTICASONE PROPIONATE 50 MCG
2 SPRAY, SUSPENSION (ML) NASAL 2 TIMES DAILY PRN
Qty: 9.9 ML | Refills: 0 | Status: SHIPPED | OUTPATIENT
Start: 2023-01-31

## 2023-01-31 RX ORDER — FOLIC ACID 1 MG/1
1000 TABLET ORAL
COMMUNITY
Start: 2023-01-07 | End: 2023-03-14

## 2023-01-31 RX ORDER — BENZONATATE 200 MG/1
200 CAPSULE ORAL 3 TIMES DAILY PRN
Qty: 30 CAPSULE | Refills: 0 | Status: SHIPPED | OUTPATIENT
Start: 2023-01-31 | End: 2023-02-10

## 2023-01-31 NOTE — PATIENT INSTRUCTIONS
Urgent care work up is limited: no D-dimer or CT-A (imaging) chest available at our facility    Below are suggestions for symptomatic relief of your upper respiratory symptoms:              -Salt water gargles to soothe throat pain.              -Chloroseptic spray and Cepacol lozenges also help to numb throat pain.              -Warm herbal teas with honey/lemon/jonathan can help soothe sore throat and hoarseness              -Nasal saline spray reduces inflammation and dryness.              -Warm face compresses to help with facial sinus pain/pressure.              -Humidifiers and steam can help with nasal dryness and congestion              -Vicks vapor rub at night for chest congestion.              -Flonase OTC or Nasacort OTC for nasal congestion and post-nasal drip. Ok to use twice daily for the first week, then reduce to once daily after symptoms have begun to improve.              -Afrin is a nasal spray that can give immediate relief of nasal congestion but you cannot use this medication for more than 3 days              -Simple foods like chicken noodle soup.              - Mucinex for congestion or Mucinex DM for cough during the day time. Delsym helps with coughing at night. Mucinex-D if you have sinus pressure/sinus pain or chest congestion. (caution if history of high blood pressure or palpitations). You must increase your water intake when using expectorants (Mucinex).             -Zyrtec/Claritin/Allegra/Xyzal should help with allergies.  -If you DO NOT have Hypertension or any history of palpitations, it is ok to take over the counter Sudafed or Mucinex D or Allegra-D or Claritin-D or Zyrtec-D.  -If you do take one of the above, it is ok to combine that with plain over the counter Mucinex or Allegra or Claritin or Zyrtec. If, for example, you are taking Zyrtec -D, you can combine that with Mucinex, but not Mucinex-D.  If you are taking Mucinex-D, you can combine that with plain Allegra or Claritin  or Zyrtec.   -If you DO have Hypertension or palpitations, it is safe to take Coricidin HBP for relief of sinus symptoms.     Seek immediate care in the emergency room in the event of severe abdominal pain, chest pain, respiratory distress, fever unresponsive to antipyretic, dehydration, loss of consciousness, seizure.

## 2023-01-31 NOTE — PROGRESS NOTES
"Subjective:       Patient ID: Geetha Meyers is a 42 y.o. female.      Chief Complaint: Sinus Problem    Pt states she has had symptoms for 4 days. Pt states symptoms are unchanged. Pt states she came to be evaluated because she was having mild chest pain which pt rated as 4 out of 10. Pt states she also has had direct covid exposure. Pt states she has tried otc medications with mild relief.    Sinus Problem  This is a new problem. The current episode started in the past 7 days. The problem is unchanged. The maximum temperature recorded prior to her arrival was 101 - 101.9 F. The fever has been present for 3 to 4 days. Her pain is at a severity of 4/10. The pain is mild. Associated symptoms include congestion, coughing, diaphoresis, ear pain, headaches, sinus pressure and a sore throat. Treatments tried: tylenol sinus, mucinex. The treatment provided mild relief.     Constitution: Positive for sweating.   HENT:  Positive for ear pain, congestion, postnasal drip, sinus pain, sinus pressure and sore throat.    Respiratory:  Positive for cough.    Musculoskeletal:  Positive for muscle ache.   Neurological:  Positive for headaches.     Objective:      Vitals:    01/31/23 1252   BP: 123/83   Pulse: 89   Resp: 18   Temp: 97.8 °F (36.6 °C)   TempSrc: Oral   SpO2: 97%   Weight: (!) 137.4 kg (303 lb)   Height: 5' 3" (1.6 m)      Physical Exam   Constitutional: She is oriented to person, place, and time. She appears well-developed. She is cooperative.  Non-toxic appearance. She does not appear ill. No distress.   HENT:   Head: Normocephalic and atraumatic.   Ears:   Right Ear: Hearing, tympanic membrane, external ear and ear canal normal.   Left Ear: Hearing, tympanic membrane, external ear and ear canal normal.   Nose: Congestion present. No mucosal edema, rhinorrhea or nasal deformity. No epistaxis. Right sinus exhibits no maxillary sinus tenderness and no frontal sinus tenderness. Left sinus exhibits no maxillary sinus " tenderness and no frontal sinus tenderness.   Mouth/Throat: Uvula is midline, oropharynx is clear and moist and mucous membranes are normal. No trismus in the jaw. Normal dentition. No uvula swelling. No oropharyngeal exudate, posterior oropharyngeal edema or posterior oropharyngeal erythema.   Eyes: Conjunctivae and lids are normal. No scleral icterus.   Neck: Trachea normal and phonation normal. Neck supple. No edema present. No erythema present. No neck rigidity present.   Cardiovascular: Normal rate, regular rhythm, normal heart sounds and normal pulses.   Pulmonary/Chest: Effort normal and breath sounds normal. No respiratory distress. She has no decreased breath sounds. She has no rhonchi. She exhibits no tenderness.   Abdominal: Normal appearance and bowel sounds are normal. Soft.   Musculoskeletal: Normal range of motion.         General: Normal range of motion.   Neurological: no focal deficit. She is alert and oriented to person, place, and time. She exhibits normal muscle tone.   Skin: Skin is warm, intact and not diaphoretic. Capillary refill takes less than 2 seconds.   Psychiatric: Her speech is normal and behavior is normal. Judgment and thought content normal.   Nursing note and vitals reviewed.      Results for orders placed or performed in visit on 01/31/23   SARS Coronavirus 2 Antigen, POCT Manual Read   Result Value Ref Range    SARS Coronavirus 2 Antigen Negative Negative     Acceptable Yes       XR CHEST PA AND LATERAL    Result Date: 1/31/2023  EXAMINATION: XR CHEST PA AND LATERAL CLINICAL HISTORY: Other chest pain TECHNIQUE: PA and lateral views of the chest were performed. COMPARISON: Chest 03/16/2021, 10/24/2019 FINDINGS: Heart size is within normal limits.  Mediastinum is unremarkable.  There is no tracheal abnormality. Lungs are well inflated.  There is no gross lobar consolidations or pneumothorax or pulmonary vascular congestion or pleural effusions.  Visualized bony  thorax demonstrates no gross abnormalities.     No acute cardiopulmonary process. Electronically signed by: Que Moore MD Date:    01/31/2023 Time:    13:43    Assessment:       1. Acute cough    2. Atypical chest pain    3. History of hysterectomy    4. Viral URI with cough          Plan:         Acute cough  -     SARS Coronavirus 2 Antigen, POCT Manual Read  I have prescribed cough regimen.    2. Atypical chest pain  -     IN OFFICE EKG 12-LEAD (to Muse) I have independently reviewed and interpreted the EKG which shows HR 86 bpm, no acute ischemic changes, normal sinus rhythm  -     XR CHEST PA AND LATERAL; Future; Expected date: 01/31/2023  Like due to use of intercostal muscles. Will need cough suppressant regimen.    3. History of hysterectomy  No chance of pregnancy. Proceed with Chest X-ray: no UPT    4. Viral URI with cough  -     fluticasone propionate (FLONASE) 50 mcg/actuation nasal spray; 2 sprays (100 mcg total) by Each Nostril route 2 (two) times daily as needed for Rhinitis.  Dispense: 9.9 mL; Refill: 0  -     promethazine-dextromethorphan (PROMETHAZINE-DM) 6.25-15 mg/5 mL Syrp; Take 5 mLs by mouth every 4 (four) hours as needed (cough).  Dispense: 240 mL; Refill: 0  -     benzonatate (TESSALON) 200 MG capsule; Take 1 capsule (200 mg total) by mouth 3 (three) times daily as needed for Cough.  Dispense: 30 capsule; Refill: 0    This was a 40 minute visit, for which 30 mins were spent reviewing the patient's previous visits, labs and images to look for any trends and/or previous treatments.      Patient Instructions   Urgent care work up is limited: no D-dimer or CT-A (imaging) chest available at our facility    Below are suggestions for symptomatic relief of your upper respiratory symptoms:              -Salt water gargles to soothe throat pain.              -Chloroseptic spray and Cepacol lozenges also help to numb throat pain.              -Warm herbal teas with honey/lemon/ginger can help soothe  sore throat and hoarseness              -Nasal saline spray reduces inflammation and dryness.              -Warm face compresses to help with facial sinus pain/pressure.              -Humidifiers and steam can help with nasal dryness and congestion              -Vicks vapor rub at night for chest congestion.              -Flonase OTC or Nasacort OTC for nasal congestion and post-nasal drip. Ok to use twice daily for the first week, then reduce to once daily after symptoms have begun to improve.              -Afrin is a nasal spray that can give immediate relief of nasal congestion but you cannot use this medication for more than 3 days              -Simple foods like chicken noodle soup.              - Mucinex for congestion or Mucinex DM for cough during the day time. Delsym helps with coughing at night. Mucinex-D if you have sinus pressure/sinus pain or chest congestion. (caution if history of high blood pressure or palpitations). You must increase your water intake when using expectorants (Mucinex).             -Zyrtec/Claritin/Allegra/Xyzal should help with allergies.  -If you DO NOT have Hypertension or any history of palpitations, it is ok to take over the counter Sudafed or Mucinex D or Allegra-D or Claritin-D or Zyrtec-D.  -If you do take one of the above, it is ok to combine that with plain over the counter Mucinex or Allegra or Claritin or Zyrtec. If, for example, you are taking Zyrtec -D, you can combine that with Mucinex, but not Mucinex-D.  If you are taking Mucinex-D, you can combine that with plain Allegra or Claritin or Zyrtec.   -If you DO have Hypertension or palpitations, it is safe to take Coricidin HBP for relief of sinus symptoms.     Seek immediate care in the emergency room in the event of severe abdominal pain, chest pain, respiratory distress, fever unresponsive to antipyretic, dehydration, loss of consciousness, seizure.

## 2023-02-09 ENCOUNTER — TELEPHONE (OUTPATIENT)
Dept: INTERNAL MEDICINE | Facility: CLINIC | Age: 43
End: 2023-02-09
Payer: COMMERCIAL

## 2023-02-09 NOTE — TELEPHONE ENCOUNTER
Patient decide to go back to urgent care for her cough .  She would like to get in sooner to establish care with Dr Mike. Was able  to schedule patient on March 14 at 8 am

## 2023-02-09 NOTE — TELEPHONE ENCOUNTER
----- Message from Pattie Chun sent at 2/8/2023  1:52 PM CST -----  Contact: [phone]  Pt's PCP just left and she does not know where she went or what happened. She has RA and states other chronic conditions that she needs treatment for . Currently she has a chronic cough she cannot get rid of. Can you please see pt sooner then August? She cannot stop coughing on the phone and has heard great things about Dr Mike. Please advise.

## 2023-03-14 ENCOUNTER — OFFICE VISIT (OUTPATIENT)
Dept: INTERNAL MEDICINE | Facility: CLINIC | Age: 43
End: 2023-03-14
Payer: COMMERCIAL

## 2023-03-14 ENCOUNTER — LAB VISIT (OUTPATIENT)
Dept: LAB | Facility: HOSPITAL | Age: 43
End: 2023-03-14
Attending: HOSPITALIST
Payer: COMMERCIAL

## 2023-03-14 VITALS
WEIGHT: 293 LBS | SYSTOLIC BLOOD PRESSURE: 138 MMHG | BODY MASS INDEX: 51.91 KG/M2 | HEART RATE: 82 BPM | TEMPERATURE: 98 F | HEIGHT: 63 IN | RESPIRATION RATE: 16 BRPM | DIASTOLIC BLOOD PRESSURE: 80 MMHG | OXYGEN SATURATION: 98 %

## 2023-03-14 DIAGNOSIS — E55.9 VITAMIN D DEFICIENCY: ICD-10-CM

## 2023-03-14 DIAGNOSIS — R06.2 WHEEZING: ICD-10-CM

## 2023-03-14 DIAGNOSIS — J32.9 SINUSITIS, UNSPECIFIED CHRONICITY, UNSPECIFIED LOCATION: ICD-10-CM

## 2023-03-14 DIAGNOSIS — I10 PRIMARY HYPERTENSION: ICD-10-CM

## 2023-03-14 DIAGNOSIS — R73.03 PREDIABETES: ICD-10-CM

## 2023-03-14 DIAGNOSIS — E78.5 HYPERLIPIDEMIA, UNSPECIFIED HYPERLIPIDEMIA TYPE: ICD-10-CM

## 2023-03-14 DIAGNOSIS — Z12.31 ENCOUNTER FOR SCREENING MAMMOGRAM FOR BREAST CANCER: ICD-10-CM

## 2023-03-14 DIAGNOSIS — Z00.00 ANNUAL PHYSICAL EXAM: Primary | ICD-10-CM

## 2023-03-14 DIAGNOSIS — Z00.00 ANNUAL PHYSICAL EXAM: ICD-10-CM

## 2023-03-14 DIAGNOSIS — H92.01 RIGHT EAR PAIN: ICD-10-CM

## 2023-03-14 DIAGNOSIS — M79.7 FIBROMYALGIA: ICD-10-CM

## 2023-03-14 DIAGNOSIS — E66.01 CLASS 3 SEVERE OBESITY WITH SERIOUS COMORBIDITY AND BODY MASS INDEX (BMI) OF 50.0 TO 59.9 IN ADULT, UNSPECIFIED OBESITY TYPE: ICD-10-CM

## 2023-03-14 DIAGNOSIS — M06.9 RHEUMATOID ARTHRITIS, INVOLVING UNSPECIFIED SITE, UNSPECIFIED WHETHER RHEUMATOID FACTOR PRESENT: ICD-10-CM

## 2023-03-14 LAB
25(OH)D3+25(OH)D2 SERPL-MCNC: 17 NG/ML (ref 30–96)
ALBUMIN SERPL BCP-MCNC: 3.6 G/DL (ref 3.5–5.2)
ALP SERPL-CCNC: 139 U/L (ref 55–135)
ALT SERPL W/O P-5'-P-CCNC: 7 U/L (ref 10–44)
ANION GAP SERPL CALC-SCNC: 10 MMOL/L (ref 8–16)
AST SERPL-CCNC: 14 U/L (ref 10–40)
BASOPHILS # BLD AUTO: 0.03 K/UL (ref 0–0.2)
BASOPHILS NFR BLD: 0.5 % (ref 0–1.9)
BILIRUB SERPL-MCNC: 0.4 MG/DL (ref 0.1–1)
BUN SERPL-MCNC: 8 MG/DL (ref 6–20)
CALCIUM SERPL-MCNC: 9.6 MG/DL (ref 8.7–10.5)
CHLORIDE SERPL-SCNC: 106 MMOL/L (ref 95–110)
CHOLEST SERPL-MCNC: 162 MG/DL (ref 120–199)
CHOLEST/HDLC SERPL: 3.8 {RATIO} (ref 2–5)
CO2 SERPL-SCNC: 23 MMOL/L (ref 23–29)
CREAT SERPL-MCNC: 0.9 MG/DL (ref 0.5–1.4)
CRP SERPL-MCNC: 9 MG/L (ref 0–8.2)
DIFFERENTIAL METHOD: ABNORMAL
EOSINOPHIL # BLD AUTO: 0 K/UL (ref 0–0.5)
EOSINOPHIL NFR BLD: 0.7 % (ref 0–8)
ERYTHROCYTE [DISTWIDTH] IN BLOOD BY AUTOMATED COUNT: 14.1 % (ref 11.5–14.5)
ERYTHROCYTE [SEDIMENTATION RATE] IN BLOOD BY PHOTOMETRIC METHOD: 31 MM/HR (ref 0–36)
EST. GFR  (NO RACE VARIABLE): >60 ML/MIN/1.73 M^2
ESTIMATED AVG GLUCOSE: 108 MG/DL (ref 68–131)
GLUCOSE SERPL-MCNC: 104 MG/DL (ref 70–110)
HBA1C MFR BLD: 5.4 % (ref 4–5.6)
HCT VFR BLD AUTO: 41.3 % (ref 37–48.5)
HCV AB SERPL QL IA: NORMAL
HDLC SERPL-MCNC: 43 MG/DL (ref 40–75)
HDLC SERPL: 26.5 % (ref 20–50)
HGB BLD-MCNC: 12.5 G/DL (ref 12–16)
HIV 1+2 AB+HIV1 P24 AG SERPL QL IA: NORMAL
IMM GRANULOCYTES # BLD AUTO: 0.01 K/UL (ref 0–0.04)
IMM GRANULOCYTES NFR BLD AUTO: 0.2 % (ref 0–0.5)
LDLC SERPL CALC-MCNC: 95.6 MG/DL (ref 63–159)
LYMPHOCYTES # BLD AUTO: 2.8 K/UL (ref 1–4.8)
LYMPHOCYTES NFR BLD: 48.3 % (ref 18–48)
MCH RBC QN AUTO: 28.9 PG (ref 27–31)
MCHC RBC AUTO-ENTMCNC: 30.3 G/DL (ref 32–36)
MCV RBC AUTO: 95 FL (ref 82–98)
MONOCYTES # BLD AUTO: 0.4 K/UL (ref 0.3–1)
MONOCYTES NFR BLD: 6.5 % (ref 4–15)
NEUTROPHILS # BLD AUTO: 2.6 K/UL (ref 1.8–7.7)
NEUTROPHILS NFR BLD: 43.8 % (ref 38–73)
NONHDLC SERPL-MCNC: 119 MG/DL
NRBC BLD-RTO: 0 /100 WBC
PLATELET # BLD AUTO: 407 K/UL (ref 150–450)
PMV BLD AUTO: 10.8 FL (ref 9.2–12.9)
POTASSIUM SERPL-SCNC: 4.3 MMOL/L (ref 3.5–5.1)
PROT SERPL-MCNC: 7.3 G/DL (ref 6–8.4)
RBC # BLD AUTO: 4.33 M/UL (ref 4–5.4)
RHEUMATOID FACT SERPL-ACNC: <13 IU/ML (ref 0–15)
SODIUM SERPL-SCNC: 139 MMOL/L (ref 136–145)
TRIGL SERPL-MCNC: 117 MG/DL (ref 30–150)
TSH SERPL DL<=0.005 MIU/L-ACNC: 1.07 UIU/ML (ref 0.4–4)
WBC # BLD AUTO: 5.84 K/UL (ref 3.9–12.7)

## 2023-03-14 PROCEDURE — 86803 HEPATITIS C AB TEST: CPT | Performed by: HOSPITALIST

## 2023-03-14 PROCEDURE — 36415 COLL VENOUS BLD VENIPUNCTURE: CPT | Mod: PO | Performed by: HOSPITALIST

## 2023-03-14 PROCEDURE — 80053 COMPREHEN METABOLIC PANEL: CPT | Performed by: HOSPITALIST

## 2023-03-14 PROCEDURE — 1159F MED LIST DOCD IN RCRD: CPT | Mod: CPTII,S$GLB,, | Performed by: HOSPITALIST

## 2023-03-14 PROCEDURE — 84443 ASSAY THYROID STIM HORMONE: CPT | Performed by: HOSPITALIST

## 2023-03-14 PROCEDURE — 3079F DIAST BP 80-89 MM HG: CPT | Mod: CPTII,S$GLB,, | Performed by: HOSPITALIST

## 2023-03-14 PROCEDURE — 3008F BODY MASS INDEX DOCD: CPT | Mod: CPTII,S$GLB,, | Performed by: HOSPITALIST

## 2023-03-14 PROCEDURE — 85652 RBC SED RATE AUTOMATED: CPT | Performed by: HOSPITALIST

## 2023-03-14 PROCEDURE — 96372 PR INJECTION,THERAP/PROPH/DIAG2ST, IM OR SUBCUT: ICD-10-PCS | Mod: S$GLB,,, | Performed by: HOSPITALIST

## 2023-03-14 PROCEDURE — 86140 C-REACTIVE PROTEIN: CPT | Performed by: HOSPITALIST

## 2023-03-14 PROCEDURE — 99999 PR PBB SHADOW E&M-EST. PATIENT-LVL V: ICD-10-PCS | Mod: PBBFAC,,, | Performed by: HOSPITALIST

## 2023-03-14 PROCEDURE — 99999 PR PBB SHADOW E&M-EST. PATIENT-LVL V: CPT | Mod: PBBFAC,,, | Performed by: HOSPITALIST

## 2023-03-14 PROCEDURE — 3075F PR MOST RECENT SYSTOLIC BLOOD PRESS GE 130-139MM HG: ICD-10-PCS | Mod: CPTII,S$GLB,, | Performed by: HOSPITALIST

## 2023-03-14 PROCEDURE — 99386 PR PREVENTIVE VISIT,NEW,40-64: ICD-10-PCS | Mod: 25,S$GLB,, | Performed by: HOSPITALIST

## 2023-03-14 PROCEDURE — 1160F RVW MEDS BY RX/DR IN RCRD: CPT | Mod: CPTII,S$GLB,, | Performed by: HOSPITALIST

## 2023-03-14 PROCEDURE — 1159F PR MEDICATION LIST DOCUMENTED IN MEDICAL RECORD: ICD-10-PCS | Mod: CPTII,S$GLB,, | Performed by: HOSPITALIST

## 2023-03-14 PROCEDURE — 80061 LIPID PANEL: CPT | Performed by: HOSPITALIST

## 2023-03-14 PROCEDURE — 85025 COMPLETE CBC W/AUTO DIFF WBC: CPT | Performed by: HOSPITALIST

## 2023-03-14 PROCEDURE — 82306 VITAMIN D 25 HYDROXY: CPT | Performed by: HOSPITALIST

## 2023-03-14 PROCEDURE — 1160F PR REVIEW ALL MEDS BY PRESCRIBER/CLIN PHARMACIST DOCUMENTED: ICD-10-PCS | Mod: CPTII,S$GLB,, | Performed by: HOSPITALIST

## 2023-03-14 PROCEDURE — 3075F SYST BP GE 130 - 139MM HG: CPT | Mod: CPTII,S$GLB,, | Performed by: HOSPITALIST

## 2023-03-14 PROCEDURE — 3008F PR BODY MASS INDEX (BMI) DOCUMENTED: ICD-10-PCS | Mod: CPTII,S$GLB,, | Performed by: HOSPITALIST

## 2023-03-14 PROCEDURE — 96372 THER/PROPH/DIAG INJ SC/IM: CPT | Mod: S$GLB,,, | Performed by: HOSPITALIST

## 2023-03-14 PROCEDURE — 99386 PREV VISIT NEW AGE 40-64: CPT | Mod: 25,S$GLB,, | Performed by: HOSPITALIST

## 2023-03-14 PROCEDURE — 83036 HEMOGLOBIN GLYCOSYLATED A1C: CPT | Performed by: HOSPITALIST

## 2023-03-14 PROCEDURE — 3079F PR MOST RECENT DIASTOLIC BLOOD PRESSURE 80-89 MM HG: ICD-10-PCS | Mod: CPTII,S$GLB,, | Performed by: HOSPITALIST

## 2023-03-14 PROCEDURE — 86431 RHEUMATOID FACTOR QUANT: CPT | Performed by: HOSPITALIST

## 2023-03-14 PROCEDURE — 87389 HIV-1 AG W/HIV-1&-2 AB AG IA: CPT | Performed by: HOSPITALIST

## 2023-03-14 RX ORDER — BENZONATATE 100 MG/1
100-200 CAPSULE ORAL 3 TIMES DAILY PRN
Qty: 60 CAPSULE | Refills: 0 | Status: SHIPPED | OUTPATIENT
Start: 2023-03-14 | End: 2023-09-18

## 2023-03-14 RX ORDER — TRIAMCINOLONE ACETONIDE 40 MG/ML
40 INJECTION, SUSPENSION INTRA-ARTICULAR; INTRAMUSCULAR
Status: COMPLETED | OUTPATIENT
Start: 2023-03-14 | End: 2023-03-14

## 2023-03-14 RX ORDER — NEOMYCIN SULFATE, POLYMYXIN B SULFATE AND HYDROCORTISONE 10; 3.5; 1 MG/ML; MG/ML; [USP'U]/ML
4 SUSPENSION/ DROPS AURICULAR (OTIC) 3 TIMES DAILY
Qty: 4 ML | Refills: 0 | Status: SHIPPED | OUTPATIENT
Start: 2023-03-14 | End: 2023-03-19

## 2023-03-14 RX ORDER — PREGABALIN 75 MG/1
75 CAPSULE ORAL NIGHTLY PRN
Qty: 90 CAPSULE | Refills: 1 | Status: SHIPPED | OUTPATIENT
Start: 2023-03-14 | End: 2023-06-01 | Stop reason: SDUPTHER

## 2023-03-14 RX ORDER — ACETAMINOPHEN 500 MG
500 TABLET ORAL EVERY 6 HOURS PRN
COMMUNITY
End: 2024-01-02 | Stop reason: SDUPTHER

## 2023-03-14 RX ORDER — ALBUTEROL SULFATE 90 UG/1
2 AEROSOL, METERED RESPIRATORY (INHALATION) EVERY 6 HOURS PRN
Qty: 18 G | Refills: 0 | Status: SHIPPED | OUTPATIENT
Start: 2023-03-14 | End: 2024-03-15 | Stop reason: ALTCHOICE

## 2023-03-14 RX ADMIN — TRIAMCINOLONE ACETONIDE 40 MG: 40 INJECTION, SUSPENSION INTRA-ARTICULAR; INTRAMUSCULAR at 10:03

## 2023-03-14 NOTE — PROGRESS NOTES
Subjective:     @Patient ID: Geetha Meyers is a 42 y.o. female.    Chief Complaint: Establish Care and Fatigue    HPI  43 yo F with hypertension, hyperlipidemia, prediabetes, rheumatoid arthritis, obesity, fibromyalgia presents for annual. Pt is new to me. She is  with 2 kids. Is a business owner.   Her last PCP was Dr Yolie Gonzalez at [facility]. Reports her last PCP has left and needs to establish care with new PCP. Also reports she will be establishing care with a rheumatologist at [facility].     Reports she had an infected fallopian tube that was removed in 2022.   She endorses cough, PND and runny nose at least 1 month. Was seen in urgent care in January and covid test was negative. Was treated with cough medications. Reports still having continued cough. Sometimes has wheezing and feels out of breath with walking    Also reports having pain of her joints including back, shoulders, arms, ankles, feet and hands. Reports likely flare of her fibromyalgia and RA. Has not restarted lyrica, MTX or plaquenil since her hysterectomy surgery in Nov/Dec 2022.       Lipid disorders/ASCVD risk (ages >/= 45 or >/= 20 if increased risk ): ordered  Eye exam:   MMG: due     Vaccines:   Influenza (yearly): defer   Tetanus (every 10 yrs - 1st tdap) utd 2021  Covid19: defer until feeling better       Review of Systems   Constitutional:  Positive for fatigue. Negative for chills and fever.   HENT:  Negative for congestion and sore throat.    Eyes:  Negative for pain and visual disturbance.   Respiratory:  Negative for cough and shortness of breath.    Cardiovascular:  Positive for leg swelling. Negative for chest pain.   Gastrointestinal:  Negative for abdominal pain, nausea and vomiting.   Endocrine: Negative for polydipsia and polyuria.   Genitourinary:  Negative for difficulty urinating and dysuria.   Musculoskeletal:  Positive for arthralgias. Negative for back pain.   Skin:  Negative for rash and wound.   Neurological:   "Negative for dizziness, weakness and headaches.   Psychiatric/Behavioral:  Negative for agitation and confusion.    Past medical history, surgical history, and family medical history reviewed and updated as appropriate.    Medications and allergies reviewed.     Objective:     Vitals:    03/14/23 0752   BP: 138/80   BP Location: Right arm   Patient Position: Sitting   BP Method: Large (Manual)   Pulse: 82   Resp: 16   Temp: 97.7 °F (36.5 °C)   TempSrc: Temporal   SpO2: 98%   Weight: 134.5 kg (296 lb 8.3 oz)   Height: 5' 3" (1.6 m)     Body mass index is 52.53 kg/m².  Physical Exam  Vitals reviewed.   Constitutional:       General: She is not in acute distress.     Appearance: She is well-developed.   HENT:      Head: Normocephalic and atraumatic.      Left Ear: Tympanic membrane normal.      Ears:      Comments: R TM mildly erythematous      Mouth/Throat:      Mouth: Mucous membranes are moist.      Pharynx: No oropharyngeal exudate.   Eyes:      General:         Right eye: No discharge.         Left eye: No discharge.      Conjunctiva/sclera: Conjunctivae normal.   Cardiovascular:      Rate and Rhythm: Normal rate and regular rhythm.      Heart sounds: No murmur heard.    No friction rub.   Pulmonary:      Effort: Pulmonary effort is normal.      Breath sounds: Normal breath sounds.   Abdominal:      General: Bowel sounds are normal. There is no distension.      Palpations: Abdomen is soft.      Tenderness: There is no abdominal tenderness. There is no guarding.   Musculoskeletal:         General: Normal range of motion.      Cervical back: Normal range of motion and neck supple.      Comments: +trace edema b/l    Lymphadenopathy:      Cervical: No cervical adenopathy.   Skin:     General: Skin is warm and dry.   Neurological:      Mental Status: She is alert and oriented to person, place, and time.   Psychiatric:         Mood and Affect: Mood normal.         Behavior: Behavior normal.       Lab Results   Component " Value Date    WBC 23.19 (H) 03/20/2021    HGB 11.9 (L) 03/20/2021    HCT 37.3 03/20/2021     (H) 03/20/2021    ALT 10 03/17/2021    AST 11 03/17/2021     03/20/2021    K 4.5 03/20/2021     03/20/2021    CREATININE 1.0 03/20/2021    BUN 20 03/20/2021    CO2 23 03/20/2021    HGBA1C 5.6 08/17/2022       Assessment:     1. Annual physical exam    2. Primary hypertension    3. Prediabetes    4. Hyperlipidemia, unspecified hyperlipidemia type    5. Class 3 severe obesity with serious comorbidity and body mass index (BMI) of 50.0 to 59.9 in adult, unspecified obesity type    6. Rheumatoid arthritis, involving unspecified site, unspecified whether rheumatoid factor present    7. Encounter for screening mammogram for breast cancer    8. Vitamin D deficiency    9. Sinusitis, unspecified chronicity, unspecified location    10. Right ear pain    11. Wheezing    12. Fibromyalgia      Plan:   Geetha was seen today for establish care and fatigue.    Diagnoses and all orders for this visit:    Annual physical exam  -     Comprehensive Metabolic Panel; Future  -     CBC Auto Differential; Future  -     Lipid Panel; Future  -     TSH; Future  -     Hemoglobin A1C; Future  -     HEPATITIS C ANTIBODY; Future  -     HIV 1/2 Ag/Ab (4th Gen); Future  -     Vitamin D; Future  -     Sedimentation rate; Future  -     C-reactive protein; Future  -     RHEUMATOID FACTOR; Future    Primary hypertension  - Stable. Continue home meds     Prediabetes  - stable. Check a1c and continue home metformin    Hyperlipidemia, unspecified hyperlipidemia type  - Stable. Continue home meds     Class 3 severe obesity with serious comorbidity and body mass index (BMI) of 50.0 to 59.9 in adult, unspecified obesity type  - per pt 2/2 to steroids  - will monitor and f/u at next visit     Rheumatoid arthritis, involving unspecified site, unspecified whether rheumatoid factor present  - pt will establish care with o/s rheumatologist. Defer  management to them   -     Sedimentation rate; Future  -     C-reactive protein; Future  -     RHEUMATOID FACTOR; Future    Encounter for screening mammogram for breast cancer  -     Mammo Digital Screening Bilat w/ Rakesh; Future    Vitamin D deficiency  -     Vitamin D; Future    Sinusitis, unspecified chronicity, unspecified location  -     triamcinolone acetonide injection 40 mg    Right ear pain  - start ear drops    Wheezing  -     albuterol (VENTOLIN HFA) 90 mcg/actuation inhaler; Inhale 2 puffs into the lungs every 6 (six) hours as needed for Wheezing. Rescue  -     triamcinolone acetonide injection 40 mg    Fibromyalgia  - refill lyrica    Other orders  -     pregabalin (LYRICA) 75 MG capsule; Take 1 capsule (75 mg total) by mouth nightly as needed (fibromyalgia).  -     neomycin-polymyxin-hydrocortisone (CORTISPORIN) 3.5-10,000-1 mg/mL-unit/mL-% otic suspension; Place 4 drops into the right ear 3 (three) times daily. for 5 days  -     benzonatate (TESSALON) 100 MG capsule; Take 1-2 capsules (100-200 mg total) by mouth 3 (three) times daily as needed for Cough.        Rtc 6 months and prn     Lisa Mike MD  Internal Medicine    3/14/2023

## 2023-05-19 ENCOUNTER — HOSPITAL ENCOUNTER (OUTPATIENT)
Dept: RADIOLOGY | Facility: HOSPITAL | Age: 43
Discharge: HOME | End: 2023-05-19
Attending: HOSPITALIST
Payer: COMMERCIAL

## 2023-05-19 VITALS — BODY MASS INDEX: 51.91 KG/M2 | WEIGHT: 293 LBS | HEIGHT: 63 IN

## 2023-05-19 DIAGNOSIS — Z12.31 ENCOUNTER FOR SCREENING MAMMOGRAM FOR BREAST CANCER: ICD-10-CM

## 2023-05-19 PROCEDURE — 77067 MAMMO DIGITAL SCREENING BILAT WITH TOMO: ICD-10-PCS | Mod: 26,,, | Performed by: RADIOLOGY

## 2023-05-19 PROCEDURE — 77063 BREAST TOMOSYNTHESIS BI: CPT | Mod: 26,,, | Performed by: RADIOLOGY

## 2023-05-19 PROCEDURE — 77067 SCR MAMMO BI INCL CAD: CPT | Mod: 26,,, | Performed by: RADIOLOGY

## 2023-05-19 PROCEDURE — 77063 MAMMO DIGITAL SCREENING BILAT WITH TOMO: ICD-10-PCS | Mod: 26,,, | Performed by: RADIOLOGY

## 2023-05-19 PROCEDURE — 77067 SCR MAMMO BI INCL CAD: CPT | Mod: TC,PO

## 2023-05-30 ENCOUNTER — PATIENT MESSAGE (OUTPATIENT)
Dept: INTERNAL MEDICINE | Facility: CLINIC | Age: 43
End: 2023-05-30
Payer: COMMERCIAL

## 2023-05-30 DIAGNOSIS — M06.9 RHEUMATOID ARTHRITIS, INVOLVING UNSPECIFIED SITE, UNSPECIFIED WHETHER RHEUMATOID FACTOR PRESENT: Primary | ICD-10-CM

## 2023-06-01 ENCOUNTER — PATIENT MESSAGE (OUTPATIENT)
Dept: INTERNAL MEDICINE | Facility: CLINIC | Age: 43
End: 2023-06-01
Payer: COMMERCIAL

## 2023-06-01 RX ORDER — PREGABALIN 75 MG/1
75 CAPSULE ORAL 2 TIMES DAILY
Qty: 180 CAPSULE | Refills: 2 | Status: SHIPPED | OUTPATIENT
Start: 2023-06-01 | End: 2024-01-03 | Stop reason: SDUPTHER

## 2023-06-04 ENCOUNTER — PATIENT MESSAGE (OUTPATIENT)
Dept: INTERNAL MEDICINE | Facility: CLINIC | Age: 43
End: 2023-06-04
Payer: COMMERCIAL

## 2023-06-05 NOTE — TELEPHONE ENCOUNTER
LYRICA, I spoke to the pharmacist and approval was given for increase dosage from 6/1/23.    The patient informed.

## 2023-06-12 ENCOUNTER — OFFICE VISIT (OUTPATIENT)
Dept: RHEUMATOLOGY | Facility: CLINIC | Age: 43
End: 2023-06-12
Payer: COMMERCIAL

## 2023-06-12 ENCOUNTER — LAB VISIT (OUTPATIENT)
Dept: LAB | Facility: HOSPITAL | Age: 43
End: 2023-06-12
Attending: INTERNAL MEDICINE
Payer: COMMERCIAL

## 2023-06-12 VITALS
SYSTOLIC BLOOD PRESSURE: 143 MMHG | BODY MASS INDEX: 51.91 KG/M2 | HEART RATE: 114 BPM | WEIGHT: 293 LBS | DIASTOLIC BLOOD PRESSURE: 86 MMHG | HEIGHT: 63 IN

## 2023-06-12 DIAGNOSIS — M79.7 FIBROMYALGIA: ICD-10-CM

## 2023-06-12 DIAGNOSIS — E66.01 MORBID OBESITY WITH BMI OF 45.0-49.9, ADULT: ICD-10-CM

## 2023-06-12 DIAGNOSIS — M79.7 FIBROMYALGIA: Primary | ICD-10-CM

## 2023-06-12 DIAGNOSIS — M06.9 RHEUMATOID ARTHRITIS, INVOLVING UNSPECIFIED SITE, UNSPECIFIED WHETHER RHEUMATOID FACTOR PRESENT: ICD-10-CM

## 2023-06-12 DIAGNOSIS — K21.9 GASTROESOPHAGEAL REFLUX DISEASE, UNSPECIFIED WHETHER ESOPHAGITIS PRESENT: ICD-10-CM

## 2023-06-12 LAB
CCP AB SER IA-ACNC: 1.1 U/ML
CK SERPL-CCNC: 113 U/L (ref 20–180)
RHEUMATOID FACT SERPL-ACNC: <13 IU/ML (ref 0–15)

## 2023-06-12 PROCEDURE — 1160F PR REVIEW ALL MEDS BY PRESCRIBER/CLIN PHARMACIST DOCUMENTED: ICD-10-PCS | Mod: CPTII,S$GLB,, | Performed by: INTERNAL MEDICINE

## 2023-06-12 PROCEDURE — 82085 ASSAY OF ALDOLASE: CPT | Performed by: INTERNAL MEDICINE

## 2023-06-12 PROCEDURE — 36415 COLL VENOUS BLD VENIPUNCTURE: CPT | Performed by: INTERNAL MEDICINE

## 2023-06-12 PROCEDURE — 3044F PR MOST RECENT HEMOGLOBIN A1C LEVEL <7.0%: ICD-10-PCS | Mod: CPTII,S$GLB,, | Performed by: INTERNAL MEDICINE

## 2023-06-12 PROCEDURE — 3044F HG A1C LEVEL LT 7.0%: CPT | Mod: CPTII,S$GLB,, | Performed by: INTERNAL MEDICINE

## 2023-06-12 PROCEDURE — 86200 CCP ANTIBODY: CPT | Performed by: INTERNAL MEDICINE

## 2023-06-12 PROCEDURE — 3077F PR MOST RECENT SYSTOLIC BLOOD PRESSURE >= 140 MM HG: ICD-10-PCS | Mod: CPTII,S$GLB,, | Performed by: INTERNAL MEDICINE

## 2023-06-12 PROCEDURE — 3077F SYST BP >= 140 MM HG: CPT | Mod: CPTII,S$GLB,, | Performed by: INTERNAL MEDICINE

## 2023-06-12 PROCEDURE — 1159F PR MEDICATION LIST DOCUMENTED IN MEDICAL RECORD: ICD-10-PCS | Mod: CPTII,S$GLB,, | Performed by: INTERNAL MEDICINE

## 2023-06-12 PROCEDURE — 99204 OFFICE O/P NEW MOD 45 MIN: CPT | Mod: S$GLB,,, | Performed by: INTERNAL MEDICINE

## 2023-06-12 PROCEDURE — 86431 RHEUMATOID FACTOR QUANT: CPT | Performed by: INTERNAL MEDICINE

## 2023-06-12 PROCEDURE — 99999 PR PBB SHADOW E&M-EST. PATIENT-LVL V: ICD-10-PCS | Mod: PBBFAC,,, | Performed by: INTERNAL MEDICINE

## 2023-06-12 PROCEDURE — 86038 ANTINUCLEAR ANTIBODIES: CPT | Performed by: INTERNAL MEDICINE

## 2023-06-12 PROCEDURE — 99999 PR PBB SHADOW E&M-EST. PATIENT-LVL V: CPT | Mod: PBBFAC,,, | Performed by: INTERNAL MEDICINE

## 2023-06-12 PROCEDURE — 1160F RVW MEDS BY RX/DR IN RCRD: CPT | Mod: CPTII,S$GLB,, | Performed by: INTERNAL MEDICINE

## 2023-06-12 PROCEDURE — 3008F BODY MASS INDEX DOCD: CPT | Mod: CPTII,S$GLB,, | Performed by: INTERNAL MEDICINE

## 2023-06-12 PROCEDURE — 1159F MED LIST DOCD IN RCRD: CPT | Mod: CPTII,S$GLB,, | Performed by: INTERNAL MEDICINE

## 2023-06-12 PROCEDURE — 3079F PR MOST RECENT DIASTOLIC BLOOD PRESSURE 80-89 MM HG: ICD-10-PCS | Mod: CPTII,S$GLB,, | Performed by: INTERNAL MEDICINE

## 2023-06-12 PROCEDURE — 3008F PR BODY MASS INDEX (BMI) DOCUMENTED: ICD-10-PCS | Mod: CPTII,S$GLB,, | Performed by: INTERNAL MEDICINE

## 2023-06-12 PROCEDURE — 3079F DIAST BP 80-89 MM HG: CPT | Mod: CPTII,S$GLB,, | Performed by: INTERNAL MEDICINE

## 2023-06-12 PROCEDURE — 82550 ASSAY OF CK (CPK): CPT | Performed by: INTERNAL MEDICINE

## 2023-06-12 PROCEDURE — 99204 PR OFFICE/OUTPT VISIT, NEW, LEVL IV, 45-59 MIN: ICD-10-PCS | Mod: S$GLB,,, | Performed by: INTERNAL MEDICINE

## 2023-06-12 NOTE — PROGRESS NOTES
Rapid3 Question Responses and Scores 6/5/2023   MDHAQ Score 1.2   Psychologic Score 3.3   Pain Score 8.5   When you awakened in the morning OVER THE LAST WEEK, did you feel stiff? Yes   If Yes, please indicate the number of hours until you are as limber as you will be for the day 24   Fatigue Score 10   Global Health Score 8.5   RAPID3 Score 7     Answers submitted by the patient for this visit:  Rheumatology Questionnaire (Submitted on 6/5/2023)  fever: No  eye redness: No  mouth sores: No  headaches: Yes  shortness of breath: Yes  chest pain: Yes  trouble swallowing: No  diarrhea: No  constipation: No  unexpected weight change: No  genital sore: No  dysuria: No  During the last 3 days, have you had a skin rash?: No  Bruises or bleeds easily: Yes  cough: No

## 2023-06-13 LAB — ANA SER QL IF: NORMAL

## 2023-06-13 NOTE — PROGRESS NOTES
History of present illness:  42-year-old female complains of diffuse aching since she was 13 years old.  The pain was of gradual onset.  She had no history of antecedent infection.  She was originally diagnosed as having fibromyalgia.  At 1 period of time she was also diagnosed as having rheumatoid arthritis.  She had been followed previously at [facility] but had not seen them for a year.  She would seen other rheumatologists in the past.  She would had prior swelling in the hands.  It is hard to tell whether was joint swelling or the entire finger.  Her pain is bad in the morning.  She is 2 hours of morning stiffness.  She has some pain with activity.  The pain does wake her up at night.    She complains of low-grade fevers.  She has occipital headache.  She has occasional raised bumps but no other rashes.  She is a history of conjunctivitis.  She has no oral ulcers, dry eye or mouth, Raynaud's phenomena, pleurisy, vaginal discharge or ulcers, chronic or bloody diarrhea.  She complains of paresthesias in her hands and feet.  She has no thrombophlebitis.  She is been pregnant 5 times.  She has 2 live births, 1 1st trimester miscarriage, and 2 terminations.  Rheumatoid arthritis runs on her father's side of the family.    She is taking Lyrica 75 mg twice daily with some relief.  She had been on Cymbalta, gabapentin, and amitriptyline in the past.  She is had no response to anti-inflammatory medicines.  She had been treated in the past with methotrexate, Humira, and hydroxychloroquine.  She feels she may have been doing better when she was on these medications.  I am uncertain whether she actually took them as directed.    Systems review:  General:  Weight has been stable   Respiratory:  She has a history of chronic cough and asthma.    GI:  Has a history of GERD.  No liver problems.  :  She has a pelvic kidney.  She is history kidney stones.    Physical examination:  Skin:  No rashes  ENT:  Adequate tears in saliva.  No  conjunctivitis or oral ulcers.    Chest:  Clear to auscultation  Cardiac:  No murmurs, gallops, rubs  Abdomen:  No organomegaly or masses.  No tenderness to palpation   Extremities:  No pedal edema   Musculoskeletal:  She is diffusely tender to palpation in both articular and nonarticular areas.  She has full range of motion of all joints.  She has no soft tissue swelling, erythema, or increased warmth.  Laboratory:  She recently had a normal sed rate and CRP.  She has had elevated inflammatory markers in the past.  She is had negative ADY, CCP, rheumatoid factor.      Assessment:  1.  She has no evidence of active inflammatory arthritis at this time   2.  Most of her problem at the present time appears to be fibromyalgia     Plans:  1.  Further laboratory studies obtained  2.  Continue Lyrica as before  3.  Resume physical therapy  4.  She may use Arthritis Tylenol as needed for pain  5.  Return in 3 months      Answers submitted by the patient for this visit:  Rheumatology Questionnaire (Submitted on 6/5/2023)  fever: No  eye redness: No  mouth sores: No  headaches: Yes  shortness of breath: Yes  chest pain: Yes  trouble swallowing: No  diarrhea: No  constipation: No  unexpected weight change: No  genital sore: No  dysuria: No  During the last 3 days, have you had a skin rash?: No  Bruises or bleeds easily: Yes  cough: No

## 2023-06-14 LAB — ALDOLASE SERPL-CCNC: 4.4 U/L (ref 1.2–7.6)

## 2023-06-16 ENCOUNTER — PATIENT MESSAGE (OUTPATIENT)
Dept: RHEUMATOLOGY | Facility: CLINIC | Age: 43
End: 2023-06-16
Payer: COMMERCIAL

## 2023-06-16 RX ORDER — BACLOFEN 10 MG/1
10 TABLET ORAL NIGHTLY
Qty: 30 TABLET | Refills: 3 | Status: SHIPPED | OUTPATIENT
Start: 2023-06-16 | End: 2023-07-13 | Stop reason: SDUPTHER

## 2023-06-17 ENCOUNTER — PATIENT MESSAGE (OUTPATIENT)
Dept: RHEUMATOLOGY | Facility: CLINIC | Age: 43
End: 2023-06-17
Payer: COMMERCIAL

## 2023-07-12 ENCOUNTER — PATIENT MESSAGE (OUTPATIENT)
Dept: RHEUMATOLOGY | Facility: CLINIC | Age: 43
End: 2023-07-12
Payer: COMMERCIAL

## 2023-07-13 RX ORDER — BACLOFEN 10 MG/1
10 TABLET ORAL 3 TIMES DAILY
Qty: 90 TABLET | Refills: 3 | Status: SHIPPED | OUTPATIENT
Start: 2023-07-13 | End: 2024-01-02

## 2023-08-09 ENCOUNTER — PATIENT MESSAGE (OUTPATIENT)
Dept: RHEUMATOLOGY | Facility: CLINIC | Age: 43
End: 2023-08-09
Payer: COMMERCIAL

## 2023-08-09 ENCOUNTER — CLINICAL SUPPORT (OUTPATIENT)
Dept: REHABILITATION | Facility: HOSPITAL | Age: 43
End: 2023-08-09
Attending: INTERNAL MEDICINE
Payer: COMMERCIAL

## 2023-08-09 DIAGNOSIS — M79.7 FIBROMYALGIA: ICD-10-CM

## 2023-08-09 DIAGNOSIS — M13.0 POLYARTHRITIS: Primary | ICD-10-CM

## 2023-08-09 PROCEDURE — 97161 PT EVAL LOW COMPLEX 20 MIN: CPT | Mod: PO

## 2023-08-09 PROCEDURE — 97110 THERAPEUTIC EXERCISES: CPT | Mod: PO

## 2023-08-09 NOTE — PLAN OF CARE
[facility]   Physical Therapy Initial Evaluation      Name: Geetha Meyers  Clinic Number: [id number]    Therapy Diagnosis:   Encounter Diagnosis   Name Primary?    Fibromyalgia         Physician: Mane Hernandez MD    Physician Orders: PT Eval and Treat   Medical Diagnosis from Referral: M79.7 (ICD-10-CM) - Fibromyalgia  Evaluation Date: 8/9/2023  Authorization Period Expiration: 12/31/23  Plan of Care Expiration: 10/15/23  Progress Note Due: 9/9/23  Visit # / Visits authorized: 1/ 1   FOTO: 0/ 3    Precautions: Standard     Time In: 330 pm  Time Out: 420 pm  Total Billable Time: 50  minutes low complexity TE 3    Subjective     Date of onset: + 20 years    History of current condition - Geetha reports: per 6/12/23 visit with rheumatology:   History of present illness:  42-year-old female complains of diffuse aching since she was 13 years old.  The pain was of gradual onset.  She had no history of antecedent infection.  She was originally diagnosed as having fibromyalgia.  At 1 period of time she was also diagnosed as having rheumatoid arthritis.  She had been followed previously at [facility] but had not seen them for a year.  She would seen other rheumatologists in the past.  She would had prior swelling in the hands.  It is hard to tell whether was joint swelling or the entire finger.  Her pain is bad in the morning.  She is 2 hours of morning stiffness.  She has some pain with activity.  The pain does wake her up at night.     She complains of low-grade fevers.  She has occipital headache.  She has occasional raised bumps but no other rashes.  She is a history of conjunctivitis.  She has no oral ulcers, dry eye or mouth, Raynaud's phenomena, pleurisy, vaginal discharge or ulcers, chronic or bloody diarrhea.  She complains of paresthesias in her hands and feet.  She has no thrombophlebitis.  She is been pregnant 5 times.  She has 2 live births, 1 1st trimester miscarriage, and 2 terminations.   Rheumatoid arthritis runs on her father's side of the family.    Falls: none    Imaging: FINDINGS:   Mild dextrocurvature of the lumbar spine from L4 to S1.   Alignment is otherwise maintained. Vertebral body heights are within normal limits. Minimal disc space narrowing.   No significant osteophyte development or facet arthropathy.   No pars defects are seen.   The visualized soft tissues of the abdomen and pelvis are within normal limits.  Exam End: 22 11:06       Prior Therapy: yes  Social History:  lives family with steps at work  Occupation: [employer] business  Prior Level of Function: community level ambulation with recreational exercise  Current Level of Function: same but limited by pain    Pain:  Current 7/10, worst 8/10, best 4/10   Location: bilateral back  and buttocks    Description: Aching, Tight, and Deep  Aggravating Factors: Walking  Easing Factors: relaxation and pain medication    Patients goals: to find way to manage pain and get relief with weight loss.      Medical History:   Past Medical History:   Diagnosis Date    Fibromyalgia          HTN (hypertension)     Hyperlipidemia     Lower leg edema     Menorrhagia     Obesity     Prediabetes     Rheumatoid arthritis        Surgical History:   Geetha Meyers  has a past surgical history that includes Endometrial ablation;  section; Hysterectomy (2022); and Left oophorectomy.    Medications:   Geetha has a current medication list which includes the following prescription(s): acetaminophen, albuterol, amlodipine, atorvastatin, baclofen, benzonatate, cetirizine, cyclobenzaprine, ergocalciferol, fluticasone propionate, furosemide, hydroxychloroquine, levocetirizine, metformin, methotrexate, omeprazole, ondansetron, prednisone, pregabalin, spironolactone, and tumeric/ging/olive/oreg/capryl.    Allergies:   Review of patient's allergies indicates:   Allergen Reactions    Amitriptyline hcl Diarrhea, Hives, Itching, Nausea Only, Rash,  Shortness Of Breath and Swelling    Bactrim [sulfamethoxazole-trimethoprim]      Aseptic meningitis    Hydrobenzthiazide Diarrhea, Nausea Only, Palpitations, Photosensitivity, Rash and Swelling    Mold Diarrhea, Hives, Itching, Nausea And Vomiting, Rash, Shortness Of Breath and Swelling    Mushroom Anaphylaxis, Diarrhea, Edema, Hives, Itching, Nausea And Vomiting, Rash and Swelling    Sulfasalazine      Orange skin,nausea,diarrhea,h/a,ringing in the ears    Sulfa (sulfonamide antibiotics)     Shea butter Hives, Itching and Rash    Wool Hives, Itching, Nausea Only, Rash and Swelling          Objective       B shoulder AROM WFL to WNL + 165 abduction and flexion ; ER +70    B cervical rotation 65 and WNL flexion and extension cervical      Lumbar ROM: (measured in degrees)    Degrees Quality   flexion   105 Worse coming out of flexion   extension   28    Rotation R 45    Rotation L 45      Active/Passive Hip ROM: (measured in degrees)    RLE LLE   Flexion 92/ 90/   Extension 20/ 20/     Lower Extremity Strength (graded 0-5 out of 5)   RLE LLE   Hip flexion: 4/5 4/5   Hip extension: 4-/5 4-/5             Knee flexion 4+/5 4+/5   Knee extension 4+/5 4+/5   Hip adduction 5/5 5/5   Hip abduction 4/5 4/5     Special Tests: ((+): pos.; (-): neg.)   Fabers Test: NT  Slump Test: NT  SLR Test: pos B  Palpation: NT  Gait: decreased arianna and step length.          Intake Outcome Measure for FOTO lumbar fibromyalgia Survey    Therapist reviewed FOTO scores for Geetha Meyers on 8/9/2023.   FOTO documents entered into EPIC - see Media section.    Intake Score: TBD%         Treatment     Total Treatment time (time-based codes) separate from Evaluation: 28 minutes     Geetha received the treatments listed below:      therapeutic exercises to develop strength, endurance, ROM, and core stabilization for 28 minutes including:  Pt education with pain management with aquatic therapy, HEP that was given for upper and lower body, recumbent  bike and yoga long term.    Back and hip program: perform to tolerance  HEP printed handout    PPT x 30  Small amplitude trunk rotation x 30    Bridges with add ball 1-3 x 10  Bridges with abd BTB 1-3 x 10  Supine clams BTB 3 x 10      Hip add/frog stretch 3 x 30 secs  Trunk rotation stretch 3 x 30 secs      Upper body program : perform to tolerance  HEP printed handout      Scapular retraction 2 x 10 hold 3 secs  Cervical AROM flexion/extension and rotation x 20  shld extension YTB 3 x 10  shld row OTB 3 x 10  Supine stretch towel 2 min vertical and then horizontal T2-T4 level  Supine horizontal abduction 2 x 15 green T band  Supine diagonals with green Tband 2 x 10    Upper trap stretch 3 x 30 secs    Levator stretch 3 x 30 secs         Patient Education and Home Exercises     Education provided:   - HEP, long term management    Written Home Exercises Provided: yes. Exercises were reviewed and Geetha was able to demonstrate them prior to the end of the session.  Geetha demonstrated good  understanding of the education provided. See EMR under Patient Instructions for exercises provided during therapy sessions.    Assessment     Geetha is a 43 y.o. female referred to outpatient Physical Therapy with a medical diagnosis of M79.7 (ICD-10-CM) - Fibromyalgia. Patient presents with upper body and lower body pain. Pt with general good insight with finding and limits associated with disease process. Pt education with POC as 2 HEP was printed and reviewed with emphasis on upper and lower body strengthening with 4 visits total prescribed with 2 visits each to review and perform home exercises as noted in the treatment session and will use the visits to modify as needed and address need for low impact home exercise program.  Pt is limited by fibromyalgia and work schedule.     Patient prognosis is Good.   Patient will benefit from skilled outpatient Physical Therapy to address the deficits stated above and in the chart below,  provide patient /family education, and to maximize patientt's level of independence.     Plan of care discussed with patient: Yes  Patient's spiritual, cultural and educational needs considered and patient is agreeable to the plan of care and goals as stated below:     Anticipated Barriers for therapy: chronic pain and work requirements    Medical Necessity is demonstrated by the following  History  Co-morbidities and personal factors that may impact the plan of care [] LOW: no personal factors / co-morbidities  [x] MODERATE: 1-2 personal factors / co-morbidities  [] HIGH: 3+ personal factors / co-morbidities    Moderate / High Support Documentation:   Co-morbidities affecting plan of care: chronic pain, fibromyalgia    Personal Factors:   lifestyle     Examination  Body Structures and Functions, activity limitations and participation restrictions that may impact the plan of care [x] LOW: addressing 1-2 elements  [] MODERATE: 3+ elements  [] HIGH: 4+ elements (please support below)    Moderate / High Support Documentation: NA     Clinical Presentation [x] LOW: stable  [] MODERATE: Evolving  [] HIGH: Unstable     Decision Making/ Complexity Score: low       Goals:STG 4  visits  1.  Pt to be independent with HEP for increased functional mobility and pain control with UE body program and lower body program    Plan     Plan of care Certification: 8/9/2023 to 10/15/23.    Outpatient Physical Therapy 1 times weekly for 5 weeks to include the following interventions: Cervical/Lumbar Traction, Manual Therapy, Moist Heat/ Ice, Neuromuscular Re-ed, Patient Education, and Therapeutic Activities.     Susan Cortez, PT        Physician's Signature: _________________________________________ Date: ________________

## 2023-08-10 RX ORDER — CYCLOBENZAPRINE HCL 10 MG
10 TABLET ORAL NIGHTLY
Qty: 30 TABLET | Refills: 3 | Status: SHIPPED | OUTPATIENT
Start: 2023-08-10 | End: 2023-09-29 | Stop reason: SDUPTHER

## 2023-08-11 RX ORDER — LIDOCAINE 50 MG/G
1 PATCH TOPICAL DAILY
Qty: 30 PATCH | Refills: 5 | Status: SHIPPED | OUTPATIENT
Start: 2023-08-11

## 2023-09-07 ENCOUNTER — HOSPITAL ENCOUNTER (OUTPATIENT)
Dept: RADIOLOGY | Facility: HOSPITAL | Age: 43
Discharge: HOME | End: 2023-09-07
Attending: INTERNAL MEDICINE
Payer: COMMERCIAL

## 2023-09-07 ENCOUNTER — TELEPHONE (OUTPATIENT)
Dept: RHEUMATOLOGY | Facility: CLINIC | Age: 43
End: 2023-09-07
Payer: COMMERCIAL

## 2023-09-07 ENCOUNTER — PATIENT MESSAGE (OUTPATIENT)
Dept: RHEUMATOLOGY | Facility: CLINIC | Age: 43
End: 2023-09-07
Payer: COMMERCIAL

## 2023-09-07 DIAGNOSIS — M13.0 POLYARTHRITIS: ICD-10-CM

## 2023-09-07 DIAGNOSIS — M13.0 POLYARTHRITIS: Primary | ICD-10-CM

## 2023-09-07 PROCEDURE — 78306 NM JOINT SCAN WHOLE BODY: ICD-10-PCS | Mod: 26,,, | Performed by: RADIOLOGY

## 2023-09-07 PROCEDURE — 78306 BONE IMAGING WHOLE BODY: CPT | Mod: TC

## 2023-09-07 PROCEDURE — 78306 BONE IMAGING WHOLE BODY: CPT | Mod: 26,,, | Performed by: RADIOLOGY

## 2023-09-07 PROCEDURE — A9512 TC99M PERTECHNETATE: HCPCS

## 2023-09-11 ENCOUNTER — HOSPITAL ENCOUNTER (OUTPATIENT)
Dept: RADIOLOGY | Facility: HOSPITAL | Age: 43
Discharge: HOME | End: 2023-09-11
Attending: INTERNAL MEDICINE
Payer: COMMERCIAL

## 2023-09-11 DIAGNOSIS — M13.0 POLYARTHRITIS: ICD-10-CM

## 2023-09-11 PROCEDURE — 73130 X-RAY EXAM OF HAND: CPT | Mod: 26,RT,, | Performed by: RADIOLOGY

## 2023-09-11 PROCEDURE — 73130 X-RAY EXAM OF HAND: CPT | Mod: TC,PO,RT

## 2023-09-11 PROCEDURE — 73130 XR HAND COMPLETE 3 VIEW RIGHT: ICD-10-PCS | Mod: 26,RT,, | Performed by: RADIOLOGY

## 2023-09-18 ENCOUNTER — OFFICE VISIT (OUTPATIENT)
Dept: INTERNAL MEDICINE | Facility: CLINIC | Age: 43
End: 2023-09-18
Payer: COMMERCIAL

## 2023-09-18 VITALS
HEIGHT: 63 IN | BODY MASS INDEX: 51.91 KG/M2 | HEART RATE: 111 BPM | WEIGHT: 293 LBS | SYSTOLIC BLOOD PRESSURE: 134 MMHG | TEMPERATURE: 98 F | OXYGEN SATURATION: 97 % | DIASTOLIC BLOOD PRESSURE: 82 MMHG | RESPIRATION RATE: 20 BRPM

## 2023-09-18 DIAGNOSIS — E66.01 CLASS 3 SEVERE OBESITY WITH SERIOUS COMORBIDITY AND BODY MASS INDEX (BMI) OF 50.0 TO 59.9 IN ADULT, UNSPECIFIED OBESITY TYPE: Primary | ICD-10-CM

## 2023-09-18 DIAGNOSIS — I10 PRIMARY HYPERTENSION: ICD-10-CM

## 2023-09-18 DIAGNOSIS — R73.03 PREDIABETES: ICD-10-CM

## 2023-09-18 DIAGNOSIS — M06.9 RHEUMATOID ARTHRITIS, INVOLVING UNSPECIFIED SITE, UNSPECIFIED WHETHER RHEUMATOID FACTOR PRESENT: ICD-10-CM

## 2023-09-18 PROCEDURE — 99214 PR OFFICE/OUTPT VISIT, EST, LEVL IV, 30-39 MIN: ICD-10-PCS | Mod: S$GLB,,, | Performed by: HOSPITALIST

## 2023-09-18 PROCEDURE — 1159F PR MEDICATION LIST DOCUMENTED IN MEDICAL RECORD: ICD-10-PCS | Mod: CPTII,S$GLB,, | Performed by: HOSPITALIST

## 2023-09-18 PROCEDURE — 3044F PR MOST RECENT HEMOGLOBIN A1C LEVEL <7.0%: ICD-10-PCS | Mod: CPTII,S$GLB,, | Performed by: HOSPITALIST

## 2023-09-18 PROCEDURE — 3075F SYST BP GE 130 - 139MM HG: CPT | Mod: CPTII,S$GLB,, | Performed by: HOSPITALIST

## 2023-09-18 PROCEDURE — 99999 PR PBB SHADOW E&M-EST. PATIENT-LVL V: CPT | Mod: PBBFAC,,, | Performed by: HOSPITALIST

## 2023-09-18 PROCEDURE — 3079F DIAST BP 80-89 MM HG: CPT | Mod: CPTII,S$GLB,, | Performed by: HOSPITALIST

## 2023-09-18 PROCEDURE — 99999 PR PBB SHADOW E&M-EST. PATIENT-LVL V: ICD-10-PCS | Mod: PBBFAC,,, | Performed by: HOSPITALIST

## 2023-09-18 PROCEDURE — 3075F PR MOST RECENT SYSTOLIC BLOOD PRESS GE 130-139MM HG: ICD-10-PCS | Mod: CPTII,S$GLB,, | Performed by: HOSPITALIST

## 2023-09-18 PROCEDURE — 1159F MED LIST DOCD IN RCRD: CPT | Mod: CPTII,S$GLB,, | Performed by: HOSPITALIST

## 2023-09-18 PROCEDURE — 3008F BODY MASS INDEX DOCD: CPT | Mod: CPTII,S$GLB,, | Performed by: HOSPITALIST

## 2023-09-18 PROCEDURE — 1160F RVW MEDS BY RX/DR IN RCRD: CPT | Mod: CPTII,S$GLB,, | Performed by: HOSPITALIST

## 2023-09-18 PROCEDURE — 3044F HG A1C LEVEL LT 7.0%: CPT | Mod: CPTII,S$GLB,, | Performed by: HOSPITALIST

## 2023-09-18 PROCEDURE — 3079F PR MOST RECENT DIASTOLIC BLOOD PRESSURE 80-89 MM HG: ICD-10-PCS | Mod: CPTII,S$GLB,, | Performed by: HOSPITALIST

## 2023-09-18 PROCEDURE — 1160F PR REVIEW ALL MEDS BY PRESCRIBER/CLIN PHARMACIST DOCUMENTED: ICD-10-PCS | Mod: CPTII,S$GLB,, | Performed by: HOSPITALIST

## 2023-09-18 PROCEDURE — 99214 OFFICE O/P EST MOD 30 MIN: CPT | Mod: S$GLB,,, | Performed by: HOSPITALIST

## 2023-09-18 PROCEDURE — 3008F PR BODY MASS INDEX (BMI) DOCUMENTED: ICD-10-PCS | Mod: CPTII,S$GLB,, | Performed by: HOSPITALIST

## 2023-09-18 RX ORDER — NABUMETONE 750 MG/1
750 TABLET, FILM COATED ORAL 2 TIMES DAILY
Qty: 60 TABLET | Refills: 4 | Status: SHIPPED | OUTPATIENT
Start: 2023-09-18 | End: 2024-01-02

## 2023-09-18 NOTE — PROGRESS NOTES
"Subjective:     @Patient ID: Geetha Meyers is a 43 y.o. female.    Chief Complaint: Follow-up and left shoulder pain    HPI    44 yo F with hypertension, hyperlipidemia, prediabetes, rheumatoid arthritis, obesity, fibromyalgia presents for f/u of chronic conditions    Obesity: reports her insurance will not cover bariatric surgery. Plans to pay out of pocket. [facility] upcoming [facility] on [facility]23 with Janie Roberts NP and Dr Roman for bariatric evaluation. Has had difficulty losing weight   RA, fibromyalgia: she reports continues to have joint pain in her hands and now shoulder. Also recently had increased in lower extremity edema. Has recently been prescribed nabumetone by rheum. Reports she has been taken off MTX and plaquenil. Reports it has been stressful dealing with her symptoms. Pt is tearful during visit. She plans to start spironolactone   HLD: reports stopped lipitor 6 months from last visit       Review of Systems   Constitutional:  Negative for chills and fever.   Cardiovascular:  Positive for leg swelling.   Musculoskeletal:  Positive for arthralgias.   Psychiatric/Behavioral:  Negative for agitation and confusion.      Past medical history, surgical history, and family medical history reviewed and updated as appropriate.    Medications and allergies reviewed.     Objective:     Vitals:    09/18/23 1530   BP: 134/82   BP Location: Right arm   Patient Position: Sitting   BP Method: Large (Manual)   Pulse: (!) 111   Resp: 20   Temp: 98 °F (36.7 °C)   TempSrc: Temporal   SpO2: 97%   Weight: (!) 141.1 kg (311 lb 1.1 oz)   Height: 5' 3" (1.6 m)     Body mass index is 55.1 kg/m².  Physical Exam  Constitutional:       Appearance: Normal appearance.   HENT:      Head: Normocephalic and atraumatic.   Eyes:      General:         Right eye: No discharge.         Left eye: No discharge.      Conjunctiva/sclera: Conjunctivae normal.   Cardiovascular:      Rate and Rhythm: Normal rate and regular " rhythm.      Heart sounds: No murmur heard.  Pulmonary:      Effort: Pulmonary effort is normal.      Breath sounds: Normal breath sounds.   Musculoskeletal:         General: Normal range of motion.      Cervical back: Normal range of motion and neck supple.      Right lower leg: Edema present.      Left lower leg: Edema present.   Skin:     General: Skin is warm and dry.   Neurological:      Mental Status: She is alert and oriented to person, place, and time.   Psychiatric:         Mood and Affect: Mood normal.         Behavior: Behavior normal.         Lab Results   Component Value Date    WBC 5.84 03/14/2023    HGB 12.5 03/14/2023    HCT 41.3 03/14/2023     03/14/2023    CHOL 162 03/14/2023    TRIG 117 03/14/2023    HDL 43 03/14/2023    ALT 7 (L) 03/14/2023    AST 14 03/14/2023     03/14/2023    K 4.3 03/14/2023     03/14/2023    CREATININE 0.9 03/14/2023    BUN 8 03/14/2023    CO2 23 03/14/2023    TSH 1.071 03/14/2023    HGBA1C 5.4 03/14/2023       Assessment:     1. Class 3 severe obesity with serious comorbidity and body mass index (BMI) of 50.0 to 59.9 in adult, unspecified obesity type    2. Primary hypertension    3. Prediabetes    4. Rheumatoid arthritis, involving unspecified site, unspecified whether rheumatoid factor present      Plan:   Geetha was seen today for follow-up and left shoulder pain.    Diagnoses and all orders for this visit:    Class 3 severe obesity with serious comorbidity and body mass index (BMI) of 50.0 to 59.9 in adult, unspecified obesity type  - chronic. pt will f/u with bariatric surgery    Primary hypertension  - Stable. Continue home meds     Prediabetes  -     Hemoglobin A1C; Future  -     Lipid Panel; Future    Rheumatoid arthritis, involving unspecified site, unspecified whether rheumatoid factor present  - pt will f/u with rheumatology        Rtc 6 months for annual     Lisa Mike MD  Internal Medicine    9/18/2023

## 2023-09-19 ENCOUNTER — CLINICAL SUPPORT (OUTPATIENT)
Dept: REHABILITATION | Facility: HOSPITAL | Age: 43
End: 2023-09-19
Payer: COMMERCIAL

## 2023-09-19 ENCOUNTER — LAB VISIT (OUTPATIENT)
Dept: LAB | Facility: HOSPITAL | Age: 43
End: 2023-09-19
Attending: HOSPITALIST
Payer: COMMERCIAL

## 2023-09-19 DIAGNOSIS — M79.7 FIBROMYALGIA: Primary | ICD-10-CM

## 2023-09-19 DIAGNOSIS — R73.03 PREDIABETES: ICD-10-CM

## 2023-09-19 LAB
CHOLEST SERPL-MCNC: 272 MG/DL (ref 120–199)
CHOLEST/HDLC SERPL: 5.7 {RATIO} (ref 2–5)
ESTIMATED AVG GLUCOSE: 114 MG/DL (ref 68–131)
HBA1C MFR BLD: 5.6 % (ref 4–5.6)
HDLC SERPL-MCNC: 48 MG/DL (ref 40–75)
HDLC SERPL: 17.6 % (ref 20–50)
LDLC SERPL CALC-MCNC: 185 MG/DL (ref 63–159)
NONHDLC SERPL-MCNC: 224 MG/DL
TRIGL SERPL-MCNC: 195 MG/DL (ref 30–150)

## 2023-09-19 PROCEDURE — 97110 THERAPEUTIC EXERCISES: CPT | Mod: PO

## 2023-09-19 PROCEDURE — 83036 HEMOGLOBIN GLYCOSYLATED A1C: CPT | Performed by: HOSPITALIST

## 2023-09-19 PROCEDURE — 36415 COLL VENOUS BLD VENIPUNCTURE: CPT | Mod: PO | Performed by: HOSPITALIST

## 2023-09-19 PROCEDURE — 80061 LIPID PANEL: CPT | Performed by: HOSPITALIST

## 2023-09-19 PROCEDURE — 97140 MANUAL THERAPY 1/> REGIONS: CPT | Mod: PO

## 2023-09-19 NOTE — PROGRESS NOTES
[facility]   Physical Therapy Treatment Note      Name: Geetha Meyers  Clinic Number: [id number]    Therapy Diagnosis:   Encounter Diagnosis   Name Primary?    Fibromyalgia Yes     Physician: Mane Hernandez MD    Visit Date: 9/19/2023    [copy and paste header from nikolai here including time in/out and billable time]  Physician Orders: PT Eval and Treat   Medical Diagnosis from Referral: M79.7 (ICD-10-CM) - Fibromyalgia  Evaluation Date: 8/9/2023  Authorization Period Expiration: 12/31/23  Plan of Care Expiration: 10/15/23  Progress Note Due: 9/9/23  Visit # / Visits authorized: 1/ 1   FOTO: 0/ 3     Precautions: Standard      Time In: 317 pm  Time Out: 403 pm  Total Billable Time: 45  minutes   PTA Visit #: 0/5       Subjective     Patient reports: Pt reported that she is having trouble with the L shoulder and lower back.  She has been having swelling in her L shoulder and B lower legs. Taking fluid pills helps with her fluid retension   She was compliant with home exercise program.  Response to previous treatment: Pt felt a little worse, but not as bad as her pain can get.  Functional change: nogoing    Pain: 5/10  Location: left shoulder and low back    Objective      Objective Measures updated at progress report unless specified.     Treatment     Geetha received the treatments listed below:      therapeutic exercises to develop strength, endurance, ROM, posture, and core stabilization for 25 minutes including:  Review of HEP and adjustments to her exercises     manual therapy techniques: Joint mobilizations and Soft tissue Mobilization were applied to the: L shoulder girdle for 15 minutes minutes, including:  L GH joint passive mobilization   L shoulder girdle soft tissue mobilization    neuromuscular re-education activities to improve: Posture for 00 minutes. The following activities were included:      therapeutic activities to improve functional performance for 00  minutes,  including:      Patient Education and Home Exercises       Education provided:   - pt to continue with her HEP and work on AA L shoulder ROM    Written Home Exercises Provided: Patient instructed to cont prior HEP. Exercises were reviewed and Geetha was able to demonstrate them prior to the end of the session.  Geetha demonstrated good  understanding of the education provided. See Electronic Medical Record under Patient Instructions for exercises provided during therapy sessions    Assessment     Geetha reports continued L shoulder girdle and low back pain. She reported some difficulty with the HEP, but that she is working through the pain and trying to get better. Pt has significant limitation in her L shoulder PROM.  Pt to progress with HEP as tolerated.    Geetha Is progressing well towards her goals.   Patient prognosis is Good.     Patient will continue to benefit from skilled outpatient physical therapy to address the deficits listed in the problem list box on initial evaluation, provide pt/family education and to maximize pt's level of independence in the home and community environment.     Patient's spiritual, cultural and educational needs considered and pt agreeable to plan of care and goals.     Anticipated barriers to physical therapy: none      Goals:STG 4  visits  1.  Pt to be independent with HEP for increased functional mobility and pain control with UE body program and lower body program  Plan     Progress Pt's HEP to allow her to be able to manage her L shoulder girdle and lower back Sx.    Toney Brown, PT

## 2023-09-28 ENCOUNTER — PATIENT MESSAGE (OUTPATIENT)
Dept: RHEUMATOLOGY | Facility: CLINIC | Age: 43
End: 2023-09-28
Payer: COMMERCIAL

## 2023-09-29 RX ORDER — CYCLOBENZAPRINE HCL 10 MG
30 TABLET ORAL NIGHTLY
Qty: 90 TABLET | Refills: 3 | Status: SHIPPED | OUTPATIENT
Start: 2023-09-29 | End: 2024-03-13

## 2023-10-03 ENCOUNTER — CLINICAL SUPPORT (OUTPATIENT)
Dept: REHABILITATION | Facility: HOSPITAL | Age: 43
End: 2023-10-03
Payer: COMMERCIAL

## 2023-10-03 DIAGNOSIS — M79.7 FIBROMYALGIA: Primary | ICD-10-CM

## 2023-10-03 PROCEDURE — 97140 MANUAL THERAPY 1/> REGIONS: CPT | Mod: PO

## 2023-10-03 PROCEDURE — 97110 THERAPEUTIC EXERCISES: CPT | Mod: PO

## 2023-10-03 NOTE — PROGRESS NOTES
[facility]   Physical Therapy Treatment Note      Name: Geetha Meyers  Clinic Number: [id number]    Therapy Diagnosis:   Encounter Diagnosis   Name Primary?    Fibromyalgia Yes       Physician: Mane Hernandez MD    Visit Date: 10/3/2023      Physician Orders: PT Eval and Treat   Medical Diagnosis from Referral: M79.7 (ICD-10-CM) - Fibromyalgia  Evaluation Date: 8/9/2023  Authorization Period Expiration: 12/31/23  Plan of Care Expiration: 10/15/23  Progress Note Due: 9/9/23  Visit # / Visits authorized: 1/ 1   FOTO: 0/ 3     Precautions: Standard      Time In: 317 pm  Time Out: 403 pm  Total Billable Time: 45  minutes   PTA Visit #: 0/5       Subjective     Patient reports: Pt reported that she is having trouble with fluid retention and believes that this may have something to do with her level of pain.    She was compliant with home exercise program.  Response to previous treatment: Pt felt a little worse, but not as bad as her pain can get.  Functional change: nogoing    Pain: 5/10  Location: left shoulder and low back    Objective      Objective Measures updated at progress report unless specified.     Treatment     Geetha received the treatments listed below:      therapeutic exercises to develop strength, endurance, ROM, posture, and core stabilization for 25 minutes including:  Review of HEP and adjustments to her exercises     manual therapy techniques: Joint mobilizations and Soft tissue Mobilization were applied to the: L shoulder girdle for 15 minutes minutes, including:  L GH joint passive mobilization   L shoulder girdle soft tissue mobilization    neuromuscular re-education activities to improve: Posture for 00 minutes. The following activities were included:      therapeutic activities to improve functional performance for 00  minutes, including:      Patient Education and Home Exercises       Education provided:   - pt to continue with her HEP and work on AA L shoulder  ROM    Written Home Exercises Provided: Patient instructed to cont prior HEP. Exercises were reviewed and Geetha was able to demonstrate them prior to the end of the session.  Geetha demonstrated good  understanding of the education provided. See Electronic Medical Record under Patient Instructions for exercises provided during therapy sessions    Assessment     Geetha reports that she is having some trouble with fluid retention that may be affecting her pain level.  She is continuing with her HEP and continues to have L shoulder girdle and low back pain.  Pt to progress with HEP as tolerated.    Geetha Is progressing well towards her goals.   Patient prognosis is Good.     Patient will continue to benefit from skilled outpatient physical therapy to address the deficits listed in the problem list box on initial evaluation, provide pt/family education and to maximize pt's level of independence in the home and community environment.     Patient's spiritual, cultural and educational needs considered and pt agreeable to plan of care and goals.     Anticipated barriers to physical therapy: none      Goals:STG 4  visits  1.  Pt to be independent with HEP for increased functional mobility and pain control with UE body program and lower body program  Plan     Progress Pt's HEP to allow her to be able to manage her L shoulder girdle and lower back Sx.    Toney Brown, PT

## 2023-10-04 ENCOUNTER — PATIENT MESSAGE (OUTPATIENT)
Dept: RHEUMATOLOGY | Facility: CLINIC | Age: 43
End: 2023-10-04
Payer: COMMERCIAL

## 2023-10-09 ENCOUNTER — TELEPHONE (OUTPATIENT)
Dept: INTERNAL MEDICINE | Facility: CLINIC | Age: 43
End: 2023-10-09
Payer: COMMERCIAL

## 2023-10-09 DIAGNOSIS — K42.9 UMBILICAL HERNIA WITHOUT OBSTRUCTION AND WITHOUT GANGRENE: ICD-10-CM

## 2023-10-09 DIAGNOSIS — E66.01 CLASS 3 SEVERE OBESITY WITH SERIOUS COMORBIDITY AND BODY MASS INDEX (BMI) OF 50.0 TO 59.9 IN ADULT, UNSPECIFIED OBESITY TYPE: Primary | ICD-10-CM

## 2023-10-09 NOTE — TELEPHONE ENCOUNTER
The patient requesting a referral to [facility].    REASON; BARIATRIC-MORBID OBESITY                   HERNIA-- Umbilical Hernia    Dr. Ethan Yepez  [address], [address] [address]  [phone]

## 2023-10-10 NOTE — TELEPHONE ENCOUNTER
Referral faxed to Dr. Ethan Yepez  [address] [place] [address]  [phone]    Fax [phone]

## 2023-10-13 ENCOUNTER — PATIENT MESSAGE (OUTPATIENT)
Dept: RHEUMATOLOGY | Facility: CLINIC | Age: 43
End: 2023-10-13
Payer: COMMERCIAL

## 2023-10-13 ENCOUNTER — OFFICE VISIT (OUTPATIENT)
Dept: URGENT CARE | Facility: CLINIC | Age: 43
End: 2023-10-13
Payer: COMMERCIAL

## 2023-10-13 ENCOUNTER — PATIENT MESSAGE (OUTPATIENT)
Dept: INTERNAL MEDICINE | Facility: CLINIC | Age: 43
End: 2023-10-13
Payer: COMMERCIAL

## 2023-10-13 VITALS
TEMPERATURE: 99 F | OXYGEN SATURATION: 96 % | HEART RATE: 100 BPM | SYSTOLIC BLOOD PRESSURE: 127 MMHG | HEIGHT: 63 IN | WEIGHT: 293 LBS | DIASTOLIC BLOOD PRESSURE: 86 MMHG | RESPIRATION RATE: 18 BRPM | BODY MASS INDEX: 51.91 KG/M2

## 2023-10-13 DIAGNOSIS — J06.9 VIRAL URI WITH COUGH: Primary | ICD-10-CM

## 2023-10-13 DIAGNOSIS — R50.9 FEVER, UNSPECIFIED FEVER CAUSE: ICD-10-CM

## 2023-10-13 LAB
CTP QC/QA: YES
CTP QC/QA: YES
POC MOLECULAR INFLUENZA A AGN: NEGATIVE
POC MOLECULAR INFLUENZA B AGN: NEGATIVE
SARS-COV-2 AG RESP QL IA.RAPID: NEGATIVE

## 2023-10-13 PROCEDURE — 87811 SARS-COV-2 COVID19 W/OPTIC: CPT | Mod: QW,S$GLB,, | Performed by: NURSE PRACTITIONER

## 2023-10-13 PROCEDURE — 87502 INFLUENZA DNA AMP PROBE: CPT | Mod: QW,S$GLB,, | Performed by: NURSE PRACTITIONER

## 2023-10-13 PROCEDURE — 99203 PR OFFICE/OUTPT VISIT, NEW, LEVL III, 30-44 MIN: ICD-10-PCS | Mod: S$GLB,,, | Performed by: NURSE PRACTITIONER

## 2023-10-13 PROCEDURE — 99203 OFFICE O/P NEW LOW 30 MIN: CPT | Mod: S$GLB,,, | Performed by: NURSE PRACTITIONER

## 2023-10-13 PROCEDURE — 87502 POCT INFLUENZA A/B MOLECULAR: ICD-10-PCS | Mod: QW,S$GLB,, | Performed by: NURSE PRACTITIONER

## 2023-10-13 PROCEDURE — 87811 SARS CORONAVIRUS 2 ANTIGEN POCT, MANUAL READ: ICD-10-PCS | Mod: QW,S$GLB,, | Performed by: NURSE PRACTITIONER

## 2023-10-13 RX ORDER — BENZONATATE 100 MG/1
100 CAPSULE ORAL EVERY 6 HOURS PRN
Qty: 30 CAPSULE | Refills: 0 | Status: SHIPPED | OUTPATIENT
Start: 2023-10-13 | End: 2024-02-29 | Stop reason: SDUPTHER

## 2023-10-13 RX ORDER — IPRATROPIUM BROMIDE 21 UG/1
1 SPRAY, METERED NASAL 2 TIMES DAILY
Qty: 30 ML | Refills: 0 | Status: SHIPPED | OUTPATIENT
Start: 2023-10-13 | End: 2023-10-20

## 2023-10-13 NOTE — PROGRESS NOTES
"Subjective:      Patient ID: Geetha Meyers is a 43 y.o. female.    Vitals:  height is 5' 3" (1.6 m) and weight is 141.1 kg (311 lb) (abnormal). Her oral temperature is 98.5 °F (36.9 °C). Her blood pressure is 127/86 and her pulse is 100. Her respiration is 18 and oxygen saturation is 96%.     Chief Complaint: Sinus Problem    Notes increased blood pressure since starting on nabumetone. Blood pressure earlier today was 134/101. Tuesday blood pressure recorded as 154/100.    Provider note begins below:    Patient comes to the clinic today with complaint of URI symptoms x1 week.  Daughter is also here with her for similar symptoms and son recently had similar symptoms before the daughter.  Last measured fever with several days ago.  Patient feels like her symptoms have significantly declined in the last 2 days.    Patient was also concerned about elevations in diastolic blood pressure.  Blood pressure normal in office today.  Advised to follow-up with her primary care provider to discuss recent changes in her medication that she feels may have contributed to her blood pressure change.    Sinus Problem  This is a new problem. The current episode started 1 to 4 weeks ago (about 1 week ago). The problem has been gradually worsening since onset. Associated symptoms include congestion, coughing, ear pain, headaches, shortness of breath, sinus pressure and a sore throat. Pertinent negatives include no chills, diaphoresis, hoarse voice, neck pain, sneezing or swollen glands. (Positive for: body aches, sweats, dental pain) Treatments tried: xyzal, zyrtec, mucinex. The treatment provided no relief.       Constitution: Negative for chills and sweating.   HENT:  Positive for ear pain, congestion, sinus pressure and sore throat.    Neck: Negative for neck pain.   Respiratory:  Positive for cough and shortness of breath.    Allergic/Immunologic: Negative for sneezing.   Neurological:  Positive for headaches.      Objective: "     Physical Exam   Constitutional: She is oriented to person, place, and time. She appears well-developed. She is cooperative.  Non-toxic appearance. She does not appear ill. No distress.      Comments:Appears uncomfortable     HENT:   Head: Normocephalic and atraumatic.   Ears:   Right Ear: Hearing, tympanic membrane, external ear and ear canal normal.   Left Ear: Hearing, tympanic membrane, external ear and ear canal normal.   Nose: Nose normal. No mucosal edema, rhinorrhea or nasal deformity. No epistaxis. Right sinus exhibits no maxillary sinus tenderness and no frontal sinus tenderness. Left sinus exhibits no maxillary sinus tenderness and no frontal sinus tenderness.   Mouth/Throat: Uvula is midline, oropharynx is clear and moist and mucous membranes are normal. No trismus in the jaw. Normal dentition. No uvula swelling. Cobblestoning present. No oropharyngeal exudate, posterior oropharyngeal edema or posterior oropharyngeal erythema.   Eyes: Conjunctivae and lids are normal. No scleral icterus.   Neck: Trachea normal and phonation normal. Neck supple. No edema present. No erythema present. No neck rigidity present.   Cardiovascular: Normal rate, regular rhythm, normal heart sounds and normal pulses.   Pulmonary/Chest: Effort normal and breath sounds normal. No respiratory distress. She has no decreased breath sounds. She has no rhonchi.   Abdominal: Normal appearance.   Musculoskeletal: Normal range of motion.         General: No deformity. Normal range of motion.   Neurological: She is alert and oriented to person, place, and time. She exhibits normal muscle tone. Coordination normal.   Skin: Skin is warm, dry, intact, not diaphoretic and not pale.   Psychiatric: Her speech is normal and behavior is normal. Judgment and thought content normal.   Nursing note and vitals reviewed.    Results for orders placed or performed in visit on 10/13/23   POCT Influenza A/B MOLECULAR   Result Value Ref Range    POC  Molecular Influenza A Ag Negative Negative, Not Reported    POC Molecular Influenza B Ag Negative Negative, Not Reported     Acceptable Yes    SARS Coronavirus 2 Antigen, POCT Manual Read   Result Value Ref Range    SARS Coronavirus 2 Antigen Negative Negative     Acceptable Yes          Assessment:     1. Viral URI with cough    2. Fever, unspecified fever cause        Plan:   Labs ordered at this visit reviewed.       Viral URI with cough  -     ipratropium (ATROVENT) 21 mcg (0.03 %) nasal spray; 1 spray by Each Nostril route 2 (two) times daily. for 7 days  Dispense: 30 mL; Refill: 0  -     benzonatate (TESSALON PERLES) 100 MG capsule; Take 1 capsule (100 mg total) by mouth every 6 (six) hours as needed for Cough.  Dispense: 30 capsule; Refill: 0    Fever, unspecified fever cause  -     POCT Influenza A/B MOLECULAR  -     SARS Coronavirus 2 Antigen, POCT Manual Read

## 2023-10-26 ENCOUNTER — CLINICAL SUPPORT (OUTPATIENT)
Dept: REHABILITATION | Facility: HOSPITAL | Age: 43
End: 2023-10-26
Payer: COMMERCIAL

## 2023-10-26 DIAGNOSIS — M79.7 FIBROMYALGIA: Primary | ICD-10-CM

## 2023-10-26 PROCEDURE — 97140 MANUAL THERAPY 1/> REGIONS: CPT | Mod: PO

## 2023-10-26 PROCEDURE — 97110 THERAPEUTIC EXERCISES: CPT | Mod: PO

## 2023-10-26 NOTE — PROGRESS NOTES
[facility]   Physical Therapy Treatment Note      Name: Geetha Meyers  Clinic Number: [id number]    Therapy Diagnosis:   Encounter Diagnosis   Name Primary?    Fibromyalgia Yes       Physician: Mane Hernandez MD    Visit Date: 10/26/2023    [copy and paste header from nikolai here including time in/out and billable time]  Physician Orders: PT Eval and Treat   Medical Diagnosis from Referral: M79.7 (ICD-10-CM) - Fibromyalgia  Evaluation Date: 8/9/2023  Authorization Period Expiration: 12/31/23  Plan of Care Expiration: 10/15/23  Progress Note Due: 9/9/23  Visit # / Visits authorized: 1/ 1   FOTO: 0/ 3     Precautions: Standard      Time In: 317 pm  Time Out: 403 pm  Total Billable Time: 45  minutes   PTA Visit #: 0/5       Subjective     Patient reports: Pt reported that she is having trouble with the L shoulder and lower back.  She has been having swelling in her L shoulder and B lower legs. Taking fluid pills helps with her fluid retension   She was compliant with home exercise program.  Response to previous treatment: Pt felt a little worse, but not as bad as her pain can get.  Functional change: nogoing    Pain: 5/10  Location: left shoulder and low back    Objective      Objective Measures updated at progress report unless specified.     Treatment     Geetha received the treatments listed below:      therapeutic exercises to develop strength, endurance, ROM, posture, and core stabilization for 15 minutes including:  Review of HEP and adjustments to her exercises     manual therapy techniques: Joint mobilizations and Soft tissue Mobilization were applied to the: L shoulder girdle for 25 minutes minutes, including:  L GH joint passive mobilization   L shoulder girdle soft tissue mobilization    neuromuscular re-education activities to improve: Posture for 00 minutes. The following activities were included:      therapeutic activities to improve functional performance for 00  minutes,  including:      Patient Education and Home Exercises       Education provided:   - pt to continue with her HEP and work on AA L shoulder ROM    Written Home Exercises Provided: Patient instructed to cont prior HEP. Exercises were reviewed and Geetha was able to demonstrate them prior to the end of the session.  Geetha demonstrated good  understanding of the education provided. See Electronic Medical Record under Patient Instructions for exercises provided during therapy sessions    Assessment     Geetha reports continued L shoulder girdle and low back pain. She reported some difficulty with the HEP, but that she is working through the pain and trying to get better. Pt has significant limitation in her L shoulder PROM.  Pt to progress with HEP as tolerated.    Geetha Is progressing well towards her goals.   Patient prognosis is Good.     Patient will continue to benefit from skilled outpatient physical therapy to address the deficits listed in the problem list box on initial evaluation, provide pt/family education and to maximize pt's level of independence in the home and community environment.     Patient's spiritual, cultural and educational needs considered and pt agreeable to plan of care and goals.     Anticipated barriers to physical therapy: none      Goals:STG 4  visits  1.  Pt to be independent with HEP for increased functional mobility and pain control with UE body program and lower body program  Plan     Progress Pt's HEP to allow her to be able to manage her L shoulder girdle and lower back Sx.    Toney Brown, PT

## 2023-11-15 ENCOUNTER — PATIENT MESSAGE (OUTPATIENT)
Dept: RHEUMATOLOGY | Facility: CLINIC | Age: 43
End: 2023-11-15
Payer: COMMERCIAL

## 2023-11-15 NOTE — PROGRESS NOTES
"[facility]   Physical Therapy Treatment Note      Name: Geetha Meyers  Clinic Number: [id number]    Therapy Diagnosis:   No diagnosis found.      Physician: Mane Hernandez MD    Visit Date: 11/17/2023    [copy and paste header from nikolai here including time in/out and billable time]  Physician Orders: PT Eval and Treat   Medical Diagnosis from Referral: M79.7 (ICD-10-CM) - Fibromyalgia  Evaluation Date: 8/9/2023  Authorization Period Expiration: 12/31/23  Plan of Care Expiration: 10/15/23 extended to 12/15/2023  Progress Note Due: 9/9/23  Visit # / Visits authorized: 4/ 20   FOTO: 0/ 3     Precautions: Standard      Time In: 330 pm  Time Out: 430 pm  Total Billable Time: 60  minutes   PTA Visit #: 0/5       Subjective     Patient reports: Pt reported that she felt better following her last Tx.  She continues to report pain in her L UE and that her L UE "falls asleep".   She was compliant with home exercise program.  Response to previous treatment: Pt felt a little worse, but not as bad as her pain can get.  Functional change: nogoing    Pain: 5/10  Location: left shoulder and low back    Objective      Objective Measures updated at progress report unless specified.     Treatment   Bolded activities performed today  Geetha received the treatments listed below:      therapeutic exercises to develop strength, endurance, ROM, posture, and core stabilization for 04 minutes including:  Review of HEP and adjustments to her exercises   Cx spine R side bending   Suggest: table arm slides    manual therapy techniques: Joint mobilizations and Soft tissue Mobilization were applied to the: L shoulder girdle for 38 minutes minutes, including:  L GH joint passive mobilization   L shoulder girdle soft tissue mobilization  L UE nerve glides  Manual Cx traction   Soft tissue mobilization thoracic paraspinals    neuromuscular re-education activities to improve: Posture for 15 minutes. The following activities " were included:  Scapular retractions 10 x 2  Shoulder shrugs 10 x 2  Standing rows 10 x 2 with green TB  No Money 10 x 2 with yellow TB      therapeutic activities to improve functional performance for 00  minutes, including:      Patient Education and Home Exercises       Education provided:   - pt to continue with her HEP and work on AA L shoulder ROM    Written Home Exercises Provided: Patient instructed to cont prior HEP. Exercises were reviewed and Geetha was able to demonstrate them prior to the end of the session.  Geetha demonstrated good  understanding of the education provided. See Electronic Medical Record under Patient Instructions for exercises provided during therapy sessions    Assessment     Geetha reports continued L shoulder girdle and low back pain with a feeling that her L UE will fall asleep.  She tolerated Manual Cx traction well today with some relief in her Sx.  Pt to progress with HEP as tolerated.    Geetha Is progressing well towards her goals.   Patient prognosis is Good.     Patient will continue to benefit from skilled outpatient physical therapy to address the deficits listed in the problem list box on initial evaluation, provide pt/family education and to maximize pt's level of independence in the home and community environment.     Patient's spiritual, cultural and educational needs considered and pt agreeable to plan of care and goals.     Anticipated barriers to physical therapy: none      Goals:STG 4  visits  1.  Pt to be independent with HEP for increased functional mobility and pain control with UE body program and lower body program  Plan     Progress Pt's HEP to allow her to be able to manage her L shoulder girdle and lower back Sx.    Toney Brown, PT

## 2023-11-17 ENCOUNTER — CLINICAL SUPPORT (OUTPATIENT)
Dept: REHABILITATION | Facility: HOSPITAL | Age: 43
End: 2023-11-17
Payer: COMMERCIAL

## 2023-11-17 DIAGNOSIS — M79.7 FIBROMYALGIA: Primary | ICD-10-CM

## 2023-11-17 PROCEDURE — 97140 MANUAL THERAPY 1/> REGIONS: CPT | Mod: PO

## 2023-11-17 PROCEDURE — 97112 NEUROMUSCULAR REEDUCATION: CPT | Mod: PO

## 2023-11-21 RX ORDER — ACETAMINOPHEN AND CODEINE PHOSPHATE 300; 30 MG/1; MG/1
1 TABLET ORAL EVERY 6 HOURS PRN
Qty: 60 TABLET | Refills: 0 | Status: SHIPPED | OUTPATIENT
Start: 2023-11-21 | End: 2024-01-02 | Stop reason: SDUPTHER

## 2023-11-28 ENCOUNTER — CLINICAL SUPPORT (OUTPATIENT)
Dept: REHABILITATION | Facility: HOSPITAL | Age: 43
End: 2023-11-28
Payer: COMMERCIAL

## 2023-11-28 DIAGNOSIS — M79.7 FIBROMYALGIA: Primary | ICD-10-CM

## 2023-11-28 PROCEDURE — 97110 THERAPEUTIC EXERCISES: CPT | Mod: PO

## 2023-11-28 PROCEDURE — 97140 MANUAL THERAPY 1/> REGIONS: CPT | Mod: PO

## 2023-11-28 PROCEDURE — 97112 NEUROMUSCULAR REEDUCATION: CPT | Mod: PO

## 2023-11-28 NOTE — PROGRESS NOTES
[facility]   Physical Therapy Treatment Note      Name: Geetha Meyers  Clinic Number: [id number]    Therapy Diagnosis:   No diagnosis found.      Physician: Mane Hernandez MD    Visit Date: 11/28/2023    [copy and paste header from nikolai here including time in/out and billable time]  Physician Orders: PT Eval and Treat   Medical Diagnosis from Referral: M79.7 (ICD-10-CM) - Fibromyalgia  Evaluation Date: 8/9/2023  Authorization Period Expiration: 12/31/23  Plan of Care Expiration: 10/15/23 extended to 12/15/2023  Progress Note Due: 9/9/23  Visit # / Visits authorized: 4/ 20   FOTO: 0/ 3     Precautions: Standard      Time In: 305 pm  Time Out: 430 pm  Total Billable Time: 55  minutes   PTA Visit #: 0/5       Subjective     Patient reports: Pt reported that she is feeling better now that she has some pain medication to take at night.  She has been able to sleep through the night which has helped with managing her pain during the day.   She was compliant with home exercise program.  Response to previous treatment: Pt felt a little worse, but not as bad as her pain can get.  Functional change: nogoing    Pain: 5/10  Location: left shoulder and low back    Objective      Objective Measures updated at progress report unless specified.     Treatment   Bolded activities performed today  Geetha received the treatments listed below:      therapeutic exercises to develop strength, endurance, ROM, posture, and core stabilization for 08 minutes including:  Review of HEP and adjustments to her exercises   Cx spine R side bending   Arm slides on the table 10 x 3 with slide board      manual therapy techniques: Joint mobilizations and Soft tissue Mobilization were applied to the: L shoulder girdle for 27 minutes minutes, including:  L GH joint passive mobilization   L shoulder girdle soft tissue mobilization  L UE nerve glides  Manual Cx traction   Soft tissue mobilization thoracic paraspinals  Passive  mobilization thoracic spine and rib cage.    neuromuscular re-education activities to improve: Posture for 20 minutes. The following activities were included:  Scapular retractions 10 x 2  Shoulder shrugs 10 x 2  Standing rows 10 x 2 with green TB  No Money 10 x 2 with yellow TB      therapeutic activities to improve functional performance for 00  minutes, including:      Patient Education and Home Exercises       Education provided:   - pt to continue with her HEP and work on AA L shoulder ROM    Written Home Exercises Provided: Patient instructed to cont prior HEP. Exercises were reviewed and Geetha was able to demonstrate them prior to the end of the session.  Geetha demonstrated good  understanding of the education provided. See Electronic Medical Record under Patient Instructions for exercises provided during therapy sessions    Assessment     Geetha reports improvement in her ability to manage her pain during the day.  She is sleeping better secondary to a change in her medication to include some pain medication that [facility] takes at night.  Pt was able to tolerate all Tx activities with less overall discomfort.  Pt to progress with Cx and thoracic paraspinal stabilization exercises as tolerated.     Geetha Is progressing well towards her goals.   Patient prognosis is Good.     Patient will continue to benefit from skilled outpatient physical therapy to address the deficits listed in the problem list box on initial evaluation, provide pt/family education and to maximize pt's level of independence in the home and community environment.     Patient's spiritual, cultural and educational needs considered and pt agreeable to plan of care and goals.     Anticipated barriers to physical therapy: none      Goals:STG 4  visits  1.  Pt to be independent with HEP for increased functional mobility and pain control with UE body program and lower body program  Plan     Progress Pt's HEP to allow her to be able to manage her L shoulder  girdle and lower back Sx.    Toney Brown, PT

## 2023-12-06 ENCOUNTER — CLINICAL SUPPORT (OUTPATIENT)
Dept: REHABILITATION | Facility: HOSPITAL | Age: 43
End: 2023-12-06
Payer: COMMERCIAL

## 2023-12-06 DIAGNOSIS — M79.7 FIBROMYALGIA: Primary | ICD-10-CM

## 2023-12-06 PROCEDURE — 97112 NEUROMUSCULAR REEDUCATION: CPT | Mod: PO

## 2023-12-06 PROCEDURE — 97110 THERAPEUTIC EXERCISES: CPT | Mod: PO

## 2023-12-06 PROCEDURE — 97140 MANUAL THERAPY 1/> REGIONS: CPT | Mod: PO

## 2023-12-06 NOTE — PROGRESS NOTES
[facility]   Physical Therapy Treatment Note      Name: Geetha Meyers  Clinic Number: [id number]    Therapy Diagnosis:   Encounter Diagnosis   Name Primary?    Fibromyalgia Yes         Physician: Mane Hernandez MD    Visit Date: 12/6/2023      Physician Orders: PT Eval and Treat   Medical Diagnosis from Referral: M79.7 (ICD-10-CM) - Fibromyalgia  Evaluation Date: 8/9/2023  Authorization Period Expiration: 12/31/23  Plan of Care Expiration: 10/15/23 extended to 12/15/2023  Progress Note Due: 9/9/23  Visit # / Visits authorized: 4/ 20   FOTO: 0/ 3     Precautions: Standard      Time In: 4:30 pm  Time Out: 5 pm  Total Billable Time: 55  minutes   PTA Visit #: 0/5       Subjective     Patient reports: Pt reported that she is feeling better now that she has some pain medication to take at night.  She has been able to sleep through the night which has helped with managing her pain during the day.   She was compliant with home exercise program.  Response to previous treatment: Pt felt a little worse, but not as bad as her pain can get.  Functional change: nogoing    Pain: 5/10  Location: left shoulder and low back    Objective      Objective Measures updated at progress report unless specified.     Treatment   Bolded activities performed today  Geetha received the treatments listed below:      therapeutic exercises to develop strength, endurance, ROM, posture, and core stabilization for 08 minutes including:  Review of HEP and adjustments to her exercises   Cx spine R side bending   Arm slides on the table 10 x 3 with slide board      manual therapy techniques: Joint mobilizations and Soft tissue Mobilization were applied to the: L shoulder girdle for 27 minutes minutes, including:  L GH joint passive mobilization   L shoulder girdle soft tissue mobilization  L UE nerve glides  Manual Cx traction   Soft tissue mobilization thoracic paraspinals  Passive mobilization thoracic spine and rib  cage.    neuromuscular re-education activities to improve: Posture for 20 minutes. The following activities were included:  Scapular retractions 10 x 2  Shoulder shrugs 10 x 2  Standing rows 10 x 2 with green TB  No Money 10 x 2 with yellow TB      therapeutic activities to improve functional performance for 00  minutes, including:      Patient Education and Home Exercises       Education provided:   - pt to continue with her HEP and work on AA L shoulder ROM    Written Home Exercises Provided: Patient instructed to cont prior HEP. Exercises were reviewed and Geetha was able to demonstrate them prior to the end of the session.  Geetha demonstrated good  understanding of the education provided. See Electronic Medical Record under Patient Instructions for exercises provided during therapy sessions    Assessment     Geetha reports improvement in her ability to manage her pain during the day.  She is sleeping better secondary to a change in her medication to include some pain medication that [facility] takes at night.  Pt was able to tolerate all Tx activities with less overall discomfort.  Pt to progress with Cx and thoracic paraspinal stabilization exercises as tolerated.     Geetha Is progressing well towards her goals.   Patient prognosis is Good.     Patient will continue to benefit from skilled outpatient physical therapy to address the deficits listed in the problem list box on initial evaluation, provide pt/family education and to maximize pt's level of independence in the home and community environment.     Patient's spiritual, cultural and educational needs considered and pt agreeable to plan of care and goals.     Anticipated barriers to physical therapy: none      Goals:STG 4  visits  1.  Pt to be independent with HEP for increased functional mobility and pain control with UE body program and lower body program  Plan     Progress Pt's HEP to allow her to be able to manage her L shoulder girdle and lower back Sx.    Toney  Kevin, PT

## 2023-12-13 ENCOUNTER — PATIENT MESSAGE (OUTPATIENT)
Dept: RHEUMATOLOGY | Facility: CLINIC | Age: 43
End: 2023-12-13
Payer: COMMERCIAL

## 2023-12-13 ENCOUNTER — CLINICAL SUPPORT (OUTPATIENT)
Dept: REHABILITATION | Facility: HOSPITAL | Age: 43
End: 2023-12-13
Payer: COMMERCIAL

## 2023-12-13 DIAGNOSIS — M79.7 FIBROMYALGIA: Primary | ICD-10-CM

## 2023-12-13 PROCEDURE — 97110 THERAPEUTIC EXERCISES: CPT | Mod: PO

## 2023-12-13 PROCEDURE — 97140 MANUAL THERAPY 1/> REGIONS: CPT | Mod: PO

## 2023-12-13 PROCEDURE — 97112 NEUROMUSCULAR REEDUCATION: CPT | Mod: PO

## 2023-12-13 NOTE — PROGRESS NOTES
[facility]   Physical Therapy Treatment Note      Name: Geetha Meyers  Clinic Number: [id number]    Therapy Diagnosis:   No diagnosis found.      Physician: Mane Hernandez MD    Visit Date: 12/13/2023      Physician Orders: PT Eval and Treat   Medical Diagnosis from Referral: M79.7 (ICD-10-CM) - Fibromyalgia  Evaluation Date: 8/9/2023  Authorization Period Expiration: 12/31/23  Plan of Care Expiration: 10/15/23 extended to 12/15/2023  Progress Note Due: 9/9/23  Visit # / Visits authorized: 4/ 20   FOTO: 0/ 3     Precautions: Standard      Time In: 4:30 pm  Time Out: 5:25 pm  Total Billable Time: 55  minutes   PTA Visit #: 0/5       Subjective     Patient reports: Pt reported that she is feeling better now that she has some pain medication to take at night.  She has been able to sleep through the night which has helped with managing her pain during the day.   She was compliant with home exercise program.  Response to previous treatment: Pt felt a little worse, but not as bad as her pain can get.  Functional change: nogoing    Pain: 5/10  Location: left shoulder and low back    Objective      Objective Measures updated at progress report unless specified.     Treatment   Bolded activities performed today  Geetha received the treatments listed below:      therapeutic exercises to develop strength, endurance, ROM, posture, and core stabilization for 08 minutes including:  Review of HEP and adjustments to her exercises   Cx spine R side bending   Arm slides on the table 10 x 3 with slide board      manual therapy techniques: Joint mobilizations and Soft tissue Mobilization were applied to the: L shoulder girdle for 27 minutes minutes, including:  L GH joint passive mobilization   L shoulder girdle soft tissue mobilization  L UE nerve glides  Manual Cx traction   Soft tissue mobilization thoracic paraspinals  Passive mobilization thoracic spine and rib cage.    neuromuscular re-education  activities to improve: Posture for 20 minutes. The following activities were included:  Scapular retractions 10 x 2  Shoulder shrugs 10 x 2  Standing rows 10 x 2 with green TB  No Money 10 x 2 with yellow TB      therapeutic activities to improve functional performance for 00  minutes, including:      Patient Education and Home Exercises       Education provided:   - pt to continue with her HEP and work on AA L shoulder ROM    Written Home Exercises Provided: Patient instructed to cont prior HEP. Exercises were reviewed and Geetha was able to demonstrate them prior to the end of the session.  Geetha demonstrated good  understanding of the education provided. See Electronic Medical Record under Patient Instructions for exercises provided during therapy sessions    Assessment     Geetha reports improvement in her ability to manage her pain during the day.  She is sleeping better secondary to a change in her medication to include some pain medication that [facility] takes at night.  Pt was able to tolerate all Tx activities with less overall discomfort.  Pt to progress with Cx and thoracic paraspinal stabilization exercises as tolerated.     Geetha Is progressing well towards her goals.   Patient prognosis is Good.     Patient will continue to benefit from skilled outpatient physical therapy to address the deficits listed in the problem list box on initial evaluation, provide pt/family education and to maximize pt's level of independence in the home and community environment.     Patient's spiritual, cultural and educational needs considered and pt agreeable to plan of care and goals.     Anticipated barriers to physical therapy: none      Goals:STG 4  visits  1.  Pt to be independent with HEP for increased functional mobility and pain control with UE body program and lower body program  Plan     Progress Pt's HEP to allow her to be able to manage her L shoulder girdle and lower back Sx.    Toney Brown, PT

## 2024-01-02 ENCOUNTER — OFFICE VISIT (OUTPATIENT)
Dept: RHEUMATOLOGY | Facility: CLINIC | Age: 44
End: 2024-01-02
Payer: COMMERCIAL

## 2024-01-02 VITALS
HEIGHT: 63 IN | SYSTOLIC BLOOD PRESSURE: 137 MMHG | HEART RATE: 114 BPM | DIASTOLIC BLOOD PRESSURE: 87 MMHG | WEIGHT: 293 LBS | BODY MASS INDEX: 51.91 KG/M2

## 2024-01-02 DIAGNOSIS — M79.7 FIBROMYALGIA: Primary | ICD-10-CM

## 2024-01-02 DIAGNOSIS — E66.01 MORBID OBESITY WITH BMI OF 45.0-49.9, ADULT: ICD-10-CM

## 2024-01-02 PROCEDURE — 99999 PR PBB SHADOW E&M-EST. PATIENT-LVL III: CPT | Mod: PBBFAC,,, | Performed by: INTERNAL MEDICINE

## 2024-01-02 PROCEDURE — 1159F MED LIST DOCD IN RCRD: CPT | Mod: CPTII,S$GLB,, | Performed by: INTERNAL MEDICINE

## 2024-01-02 PROCEDURE — 3008F BODY MASS INDEX DOCD: CPT | Mod: CPTII,S$GLB,, | Performed by: INTERNAL MEDICINE

## 2024-01-02 PROCEDURE — 3079F DIAST BP 80-89 MM HG: CPT | Mod: CPTII,S$GLB,, | Performed by: INTERNAL MEDICINE

## 2024-01-02 PROCEDURE — 1160F RVW MEDS BY RX/DR IN RCRD: CPT | Mod: CPTII,S$GLB,, | Performed by: INTERNAL MEDICINE

## 2024-01-02 PROCEDURE — 99213 OFFICE O/P EST LOW 20 MIN: CPT | Mod: S$GLB,,, | Performed by: INTERNAL MEDICINE

## 2024-01-02 PROCEDURE — 3075F SYST BP GE 130 - 139MM HG: CPT | Mod: CPTII,S$GLB,, | Performed by: INTERNAL MEDICINE

## 2024-01-02 RX ORDER — ETODOLAC 400 MG/1
400 TABLET, FILM COATED ORAL 2 TIMES DAILY
Qty: 30 TABLET | Refills: 5 | Status: SHIPPED | OUTPATIENT
Start: 2024-01-02

## 2024-01-02 RX ORDER — ACETAMINOPHEN AND CODEINE PHOSPHATE 300; 30 MG/1; MG/1
1 TABLET ORAL EVERY 6 HOURS PRN
Qty: 60 TABLET | Refills: 0 | Status: SHIPPED | OUTPATIENT
Start: 2024-01-02 | End: 2024-02-01

## 2024-01-02 RX ORDER — ACETAMINOPHEN 500 MG
500 TABLET ORAL EVERY 6 HOURS PRN
Qty: 60 TABLET | Refills: 0 | Status: SHIPPED | OUTPATIENT
Start: 2024-01-02

## 2024-01-02 RX ORDER — ASPIRIN 325 MG/1
TABLET, FILM COATED ORAL
COMMUNITY

## 2024-01-03 ENCOUNTER — PATIENT MESSAGE (OUTPATIENT)
Dept: RHEUMATOLOGY | Facility: CLINIC | Age: 44
End: 2024-01-03
Payer: COMMERCIAL

## 2024-01-03 DIAGNOSIS — M79.7 FIBROMYALGIA: Primary | ICD-10-CM

## 2024-01-03 RX ORDER — PREGABALIN 75 MG/1
75 CAPSULE ORAL 2 TIMES DAILY
Qty: 180 CAPSULE | Refills: 2 | Status: SHIPPED | OUTPATIENT
Start: 2024-01-03

## 2024-01-03 NOTE — PROGRESS NOTES
History of present illness:  43-year-old female I saw initially in June, 2023.  She complains of diffuse aching since she was 13 years old.  The pain was of gradual onset.  She had no history of antecedent infection.  She was originally diagnosed as having fibromyalgia.  At one period of time she was also diagnosed as having rheumatoid arthritis.  She had been followed previously at \[facility]\"" but had not seen them for over a year.  She would seen Dr. Toney Pratt one tome.  She had had prior swelling in the hands.  It is hard to tell whether it was joint swelling or the entire finger.  Her pain is bad in the morning.  She is 2 hours of morning stiffness.  She has some pain with activity.  The pain does wake her up at night.     She was taking Lyrica 75 mg twice daily with some relief.  She had been on Cymbalta, gabapentin, and amitriptyline in the past.  She is had no response to anti-inflammatory medicines.  She had been treated in the past with methotrexate, Humira, and hydroxychloroquine.  She feels she may have been doing better when she was on these medications.  I am uncertain whether she actually took them as directed.  At the time of her visit she had no active synovitis.  I did not place her back on a DMARD.  I increased her Lyrica to 150 mg twice daily.  I placed her back on physical therapy.    She is doing better since her last visit.  She is sleeping better.  She is taking Lyrica 75 mg in the morning and 150 mg at bedtime.  She did not tolerate the higher dose in the morning.  I had placed her on nabumetone but she did not tolerate it.  She is on Flexeril 30 mg at bedtime.  This has been helping and she tolerates the dose.  She has not drowsy the next morning.    She is being evaluated for bariatric surgery.  She was found to have vitamin-B and D deficiencies.  She was placed on supplements.  She has had no other recent medical problem.      Physical examination was not performed, the entire time was  counseling.    Assessment:  1.  Clinically she only has evidence of fibromyalgia and osteoarthritis   2. She has no evidence of active rheumatoid arthritis    Plans:  1. I placed her on etodolac 400 mg twice daily as an anti-inflammatory agent   2. Continue Lyrica and Flexeril as before  3.  Return in 6 months      Answers submitted by the patient for this visit:  Rheumatology Questionnaire (Submitted on 1/2/2024)  fever: No  eye redness: No  mouth sores: No  headaches: Yes  shortness of breath: Yes  chest pain: No  trouble swallowing: No  diarrhea: No  constipation: No  unexpected weight change: Yes  genital sore: No  dysuria: No  During the last 3 days, have you had a skin rash?: No  Bruises or bleeds easily: Yes  cough: No

## 2024-01-03 NOTE — TELEPHONE ENCOUNTER
No care due was identified.  [facility] Embedded Care Due Messages. Reference number: [id number].   1/03/2024 10:00:52 AM CST

## 2024-01-04 ENCOUNTER — PATIENT MESSAGE (OUTPATIENT)
Dept: REHABILITATION | Facility: HOSPITAL | Age: 44
End: 2024-01-04
Payer: COMMERCIAL

## 2024-02-08 ENCOUNTER — CLINICAL SUPPORT (OUTPATIENT)
Dept: REHABILITATION | Facility: HOSPITAL | Age: 44
End: 2024-02-08
Attending: INTERNAL MEDICINE
Payer: COMMERCIAL

## 2024-02-08 DIAGNOSIS — M79.7 FIBROMYALGIA: ICD-10-CM

## 2024-02-08 NOTE — PROGRESS NOTES
[facility]   Physical Therapy Initial Evaluation      Name: Geetha Meyers  Clinic Number: [id number]    Therapy Diagnosis:   Encounter Diagnosis   Name Primary?    Fibromyalgia         Physician: Mane Hernandez MD    Physician Orders: PT Eval and Treat   Medical Diagnosis from Referral: Fibromyalgia   Evaluation Date: 2/8/2024  Authorization Period Expiration: 12/31/2024  Plan of Care Expiration: 4/8/2024  Progress Note Due: 3/8/2024  Date of Surgery: NA  Visit # / Visits authorized: 1/ 20   FOTO: 0/ 3    Precautions: Standard     Time In: 4:15  Time Out: 5:10  Total Billable Time: 55 minutes    Subjective     Date of onset: Pt  with started in her teens.    History of current condition - Geetha reports: she has been Tx for arthritis in 2009 with steroids that caused her to gain weight.  She stated that she is using Humera, but it was depleating her immune system.  Whatever Tx she has tried makes her retain fluid.  She gets parafin Tx ever couple of weeks for her hands.  She reported that she has been more active lately than she had been since her surgery - hysterectomy 12/15/22 and 2 other related surgeries in the 2 years prior to the hysterectomy.  She is not having as much of a problem with pins and needles.  She has noticed that she normally feels better if she is traveling by car than flying  Pt reports that she has plans for surgery to  perform hernia repairs and gastric sleeve  Falls: none  Imaging: none recently:     Prior Therapy: yes  Social History: Pt lives with her family in a first floor apartment with grab bars. 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  Prior Level of Function: Pt has not been as active since her hysterectomy in 12/15/22  Current Level of Function: Pt reported that she is more active than she has been in the last year. She continues to retain fluid    Pain:  Current 9/10, worst 10/10, best 9/10   Location: all  over her body  R side of her lower back and across her shoulders  Description: Aching, Burning, and Throbbing  Aggravating Factors: present at all times   Easing Factors: pain medication and rest    Patients goals: to be more mobile and get her work done.     Medical History:   Past Medical History:   Diagnosis Date    Fibromyalgia     HTN (hypertension)     Hyperlipidemia     Lower leg edema     Menorrhagia     Obesity     Prediabetes     Rheumatoid arthritis        Surgical History:   Geetha Meyers  has a past surgical history that includes Endometrial ablation;  section; Hysterectomy (2022); and Left oophorectomy.    Medications:   Geetha has a current medication list which includes the following prescription(s): acetaminophen, albuterol, amlodipine, benzonatate, cetirizine, cyclobenzaprine, etodolac, fluticasone propionate, furosemide, levocetirizine, lidocaine, ondansetron, pregabalin, spironolactone, and thiamine mononitrate (vit b1).    Allergies:   Review of patient's allergies indicates:   Allergen Reactions    Amitriptyline hcl Diarrhea, Hives, Itching, Nausea Only, Rash, Shortness Of Breath and Swelling    Bactrim [sulfamethoxazole-trimethoprim]      Aseptic meningitis    Hydrobenzthiazide Diarrhea, Nausea Only, Palpitations, Photosensitivity, Rash and Swelling    Mold Diarrhea, Hives, Itching, Nausea And Vomiting, Rash, Shortness Of Breath and Swelling    Mushroom Anaphylaxis, Diarrhea, Edema, Hives, Itching, Nausea And Vomiting, Rash and Swelling    Sulfasalazine      Orange skin,nausea,diarrhea,h/a,ringing in the ears    Sulfa (sulfonamide antibiotics)     Shea butter Hives, Itching and Rash    Wool Hives, Itching, Nausea Only, Rash and Swelling        Objective        Observation: Pt was able to enter the clinic ambulating independently without an assistive device.     Posture:  L iliac crest elevated as compared to the R.  Forward head and rounded shoulders    Lumbar Range of Motion:     Degrees Pain   Flexion 75%   yes        Extension 25%   yes        Left Side Bending 50 Yes R side        Right Side Bending 50 Yes R side        Left rotation   nt nt        Right Rotation   nt nt             Lower Extremity Strength  Right LE  Left LE    Knee extension: 5/5 Knee extension: 5/5   Knee flexion: 5/5 Knee flexion: 4+/5   Hip flexion: 3/5 Hip flexion: 3/5   Hip extension:  nt Hip extension: nt   Hip abduction: nt Hip abduction: nt   Hip adduction: nt Hip adduction nt   Ankle dorsiflexion: nt Ankle dorsiflexion: nt   Ankle plantarflexion: nt Ankle plantarflexion: nt         Special Tests:  -Repeated Flexion: nt  -Repeated Ext: nt  -Piriformis Test: nt  -Prone Instability Test: nt  -Bridge Test: nt  -OH Squat: nt        Neuro Dynamic Testing:    Sciatic nerve:      SLR: R = nt     L = nt       Femoral Nerve:    Femoral nerve test: nt      Joint Mobility: nt    Palpation: nt    Sensation: R thumb more than L, index finger L more than R    Flexibility:    Ely's test: R = nt degrees ; L = nt degrees   Popliteal Angle: R = nt degrees ; L = nt degrees   Seema's test: R = nt ; L = nt   Ethan test: R = nt ; L = nt    Cervical Range of Motion:    Degrees Pain   Flexion 75 Pain @ upper thoracic and Cx spine     Extension 50 OK     Right Rotation 50 crunchy     Left Rotation 50 Stiff on L      Right Side Bending 75 Tight L   Left Side Bending 75 L side into her shoulder      Shoulder Range of Motion:   Shoulder Left Right   Flexion 90 A 150 A   Abduction nt nt   ER nt nt   IR nt nt     Strength:  Cervical MMT   Flexion nt   Extension nt   Right Side Bend nt   Left Side Bend nt     Upper Extremity Strength  (R) UE  (L) UE    Shoulder flexion: nt Shoulder flexion: nt   Shoulder Abduction: nt Shoulder abduction: nt   Shoulder ER nt Shoulder ER nt   Shoulder IR nt Shoulder IR nt   Elbow flexion: nt Elbow flexion: nt   Elbow extension: nt Elbow extension: nt   Wrist flexion: nt Wrist flexion: nt   Wrist extension: nt  Wrist extension: nt    nt : nt   Lower Trap nt Lower Trap nt   Middle Trap nt Middle Trap nt   Rhomboids nt Rhomboids nt         Special Tests:  Distraction nt   Compression nt   Spurlings nt   Sharp-Wendy nt   VA test nt   Lateral Flexion Alar Ligament nt   DNF test nt       Joint Mobility: nt,    PA's nt,    Transverse glides nt,      Thoracic mobility: nt    Palpation: nt      Sensation: nt    Flexibility: nt    Intake Outcome Measure for FOTO  Survey    Therapist reviewed FOTO scores for Geetha Meyers on 2/8/2024.   FOTO report - see Media section or FOTO account episode details.    Intake Score: %         Treatment     No Tx was performed   Patient Education and Home Exercises     Education provided:   - pt was instructed to continue with her present exercise program as it has given her some relief.    Written Home Exercises Provided:  no exercises were provided today .   Assessment     Geetha is a 43 y.o. female referred to outpatient Physical Therapy with a medical diagnosis of fibromyalgia. Patient presents with with c/o pain all over her body.  She also reported pain in the R side of her lower back and across her shoulders    Patient prognosis is Fair    Patient will benefit from skilled outpatient Physical Therapy to address the deficits stated above and in the chart below, provide patient /family education, and to maximize patientt's level of independence.     Plan of care discussed with patient: Yes  Patient's spiritual, cultural and educational needs considered and patient is agreeable to the plan of care and goals as stated below:     Anticipated Barriers for therapy: scheduling    Medical Necessity is demonstrated by the following  History  Co-morbidities and personal factors that may impact the plan of care [] LOW: no personal factors / co-morbidities  [x] MODERATE: 1-2 personal factors / co-morbidities  [] HIGH: 3+ personal factors / co-morbidities    Moderate / High Support Documentation:    Co-morbidities affecting plan of care: chronicity of her present Sx.    Personal Factors:   coping style     Examination  Body Structures and Functions, activity limitations and participation restrictions that may impact the plan of care [] LOW: addressing 1-2 elements  [x] MODERATE: 3+ elements  [] HIGH: 4+ elements (please support below)    Moderate / High Support Documentation: pain, ROM strength     Clinical Presentation [] LOW: stable  [x] MODERATE: Evolving  [] HIGH: Unstable     Decision Making/ Complexity Score: moderate       Goals:  Short Term Goals: 4 weeks   Pt will be instructed in an exercise program to address functional deficits related to her Sx.  Decrease Pt's pain to </= 7/10    Long Term Goals: 8 weeks   Pt will be independent in an exercise program to assist in managing her Sx.  Decrease Pt'a pain to </= 5/10  Plan     Plan of care Certification: 2/8/2024 to 4/8/2024.    Outpatient Physical Therapy 1 times weekly for 6 weeks to include the following interventions: Cervical/Lumbar Traction, Electrical Stimulation as indicated , Gait Training, Manual Therapy, Neuromuscular Re-ed, Patient Education, Self Care, Therapeutic Activities, Therapeutic Exercise, and Dry needling .     Toney Brown, PT        Physician's Signature: _________________________________________ Date: ________________

## 2024-02-13 NOTE — PLAN OF CARE
[facility]   Physical Therapy Initial Evaluation      Name: Geetha Meyers  Clinic Number: [id number]    Therapy Diagnosis:   Encounter Diagnosis   Name Primary?    Fibromyalgia         Physician: Mane Hernandez MD    Physician Orders: PT Eval and Treat   Medical Diagnosis from Referral: Fibromyalgia   Evaluation Date: 2/8/2024  Authorization Period Expiration: 12/31/2024  Plan of Care Expiration: 4/8/2024  Progress Note Due: 3/8/2024  Date of Surgery: NA  Visit # / Visits authorized: 1/ 20   FOTO: 0/ 3    Precautions: Standard     Time In: 4:15  Time Out: 5:10  Total Billable Time: 55 minutes    Subjective     Date of onset: Pt  with started in her teens.    History of current condition - Geetha reports: she has been Tx for arthritis in 2009 with steroids that caused her to gain weight.  She stated that she is using Humera, but it was depleating her immune system.  Whatever Tx she has tried makes her retain fluid.  She gets parafin Tx ever couple of weeks for her hands.  She reported that she has been more active lately than she had been since her surgery - hysterectomy 12/15/22 and 2 other related surgeries in the 2 years prior to the hysterectomy.  She is not having as much of a problem with pins and needles.  She has noticed that she normally feels better if she is traveling by car than flying  Pt reports that she has plans for surgery to  perform hernia repairs and gastric sleeve  Falls: none  Imaging: none recently:     Prior Therapy: yes  Social History: Pt lives with her family in a first floor apartment with grab bars. 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  Prior Level of Function: Pt has not been as active since her hysterectomy in 12/15/22  Current Level of Function: Pt reported that she is more active than she has been in the last year. She continues to retain fluid    Pain:  Current 9/10, worst 10/10, best 9/10   Location: all  over her body  R side of her lower back and across her shoulders  Description: Aching, Burning, and Throbbing  Aggravating Factors: present at all times   Easing Factors: pain medication and rest    Patients goals: to be more mobile and get her work done.     Medical History:   Past Medical History:   Diagnosis Date    Fibromyalgia     HTN (hypertension)     Hyperlipidemia     Lower leg edema     Menorrhagia     Obesity     Prediabetes     Rheumatoid arthritis        Surgical History:   Geetha Meyers  has a past surgical history that includes Endometrial ablation;  section; Hysterectomy (2022); and Left oophorectomy.    Medications:   Geetha has a current medication list which includes the following prescription(s): acetaminophen, albuterol, amlodipine, benzonatate, cetirizine, cyclobenzaprine, etodolac, fluticasone propionate, furosemide, levocetirizine, lidocaine, ondansetron, pregabalin, spironolactone, and thiamine mononitrate (vit b1).    Allergies:   Review of patient's allergies indicates:   Allergen Reactions    Amitriptyline hcl Diarrhea, Hives, Itching, Nausea Only, Rash, Shortness Of Breath and Swelling    Bactrim [sulfamethoxazole-trimethoprim]      Aseptic meningitis    Hydrobenzthiazide Diarrhea, Nausea Only, Palpitations, Photosensitivity, Rash and Swelling    Mold Diarrhea, Hives, Itching, Nausea And Vomiting, Rash, Shortness Of Breath and Swelling    Mushroom Anaphylaxis, Diarrhea, Edema, Hives, Itching, Nausea And Vomiting, Rash and Swelling    Sulfasalazine      Orange skin,nausea,diarrhea,h/a,ringing in the ears    Sulfa (sulfonamide antibiotics)     Shea butter Hives, Itching and Rash    Wool Hives, Itching, Nausea Only, Rash and Swelling        Objective        Observation: Pt was able to enter the clinic ambulating independently without an assistive device.     Posture:  L iliac crest elevated as compared to the R.  Forward head and rounded shoulders    Lumbar Range of Motion:     Degrees Pain   Flexion 75%   yes        Extension 25%   yes        Left Side Bending 50 Yes R side        Right Side Bending 50 Yes R side        Left rotation   nt nt        Right Rotation   nt nt             Lower Extremity Strength  Right LE  Left LE    Knee extension: 5/5 Knee extension: 5/5   Knee flexion: 5/5 Knee flexion: 4+/5   Hip flexion: 3/5 Hip flexion: 3/5   Hip extension:  nt Hip extension: nt   Hip abduction: nt Hip abduction: nt   Hip adduction: nt Hip adduction nt   Ankle dorsiflexion: nt Ankle dorsiflexion: nt   Ankle plantarflexion: nt Ankle plantarflexion: nt         Special Tests:  -Repeated Flexion: nt  -Repeated Ext: nt  -Piriformis Test: nt  -Prone Instability Test: nt  -Bridge Test: nt  -OH Squat: nt        Neuro Dynamic Testing:    Sciatic nerve:      SLR: R = nt     L = nt       Femoral Nerve:    Femoral nerve test: nt      Joint Mobility: nt    Palpation: nt    Sensation: R thumb more than L, index finger L more than R    Flexibility:    Ely's test: R = nt degrees ; L = nt degrees   Popliteal Angle: R = nt degrees ; L = nt degrees   Seema's test: R = nt ; L = nt   Ethan test: R = nt ; L = nt    Cervical Range of Motion:    Degrees Pain   Flexion 75 Pain @ upper thoracic and Cx spine     Extension 50 OK     Right Rotation 50 crunchy     Left Rotation 50 Stiff on L      Right Side Bending 75 Tight L   Left Side Bending 75 L side into her shoulder      Shoulder Range of Motion:   Shoulder Left Right   Flexion 90 A 150 A   Abduction nt nt   ER nt nt   IR nt nt     Strength:  Cervical MMT   Flexion nt   Extension nt   Right Side Bend nt   Left Side Bend nt     Upper Extremity Strength  (R) UE  (L) UE    Shoulder flexion: nt Shoulder flexion: nt   Shoulder Abduction: nt Shoulder abduction: nt   Shoulder ER nt Shoulder ER nt   Shoulder IR nt Shoulder IR nt   Elbow flexion: nt Elbow flexion: nt   Elbow extension: nt Elbow extension: nt   Wrist flexion: nt Wrist flexion: nt   Wrist extension: nt  Wrist extension: nt    nt : nt   Lower Trap nt Lower Trap nt   Middle Trap nt Middle Trap nt   Rhomboids nt Rhomboids nt         Special Tests:  Distraction nt   Compression nt   Spurlings nt   Sharp-Wendy nt   VA test nt   Lateral Flexion Alar Ligament nt   DNF test nt       Joint Mobility: nt,    PA's nt,    Transverse glides nt,      Thoracic mobility: nt    Palpation: nt      Sensation: nt    Flexibility: nt    Intake Outcome Measure for FOTO  Survey    Therapist reviewed FOTO scores for Geetha Meyers on 2/8/2024.   FOTO report - see Media section or FOTO account episode details.    Intake Score: %         Treatment     No Tx was performed   Patient Education and Home Exercises     Education provided:   - pt was instructed to continue with her present exercise program as it has given her some relief.    Written Home Exercises Provided: no exercises were provided today.   Assessment     Geetha is a 43 y.o. female referred to outpatient Physical Therapy with a medical diagnosis of fibromyalgia. Patient presents with with c/o pain all over her body.  She also reported pain in the R side of her lower back and across her shoulders    Patient prognosis is Fair    Patient will benefit from skilled outpatient Physical Therapy to address the deficits stated above and in the chart below, provide patient /family education, and to maximize patientt's level of independence.     Plan of care discussed with patient: Yes  Patient's spiritual, cultural and educational needs considered and patient is agreeable to the plan of care and goals as stated below:     Anticipated Barriers for therapy: scheduling    Medical Necessity is demonstrated by the following  History  Co-morbidities and personal factors that may impact the plan of care [] LOW: no personal factors / co-morbidities  [x] MODERATE: 1-2 personal factors / co-morbidities  [] HIGH: 3+ personal factors / co-morbidities    Moderate / High Support Documentation:    Co-morbidities affecting plan of care: chronicity of her present Sx.    Personal Factors:   coping style     Examination  Body Structures and Functions, activity limitations and participation restrictions that may impact the plan of care [] LOW: addressing 1-2 elements  [x] MODERATE: 3+ elements  [] HIGH: 4+ elements (please support below)    Moderate / High Support Documentation: pain, ROM strength     Clinical Presentation [] LOW: stable  [x] MODERATE: Evolving  [] HIGH: Unstable     Decision Making/ Complexity Score: moderate       Goals:  Short Term Goals: 4 weeks   Pt will be instructed in an exercise program to address functional deficits related to her Sx.  Decrease Pt's pain to </= 7/10    Long Term Goals: 8 weeks   Pt will be independent in an exercise program to assist in managing her Sx.  Decrease Pt'a pain to </= 5/10  Plan     Plan of care Certification: 2/8/2024 to 4/8/2024.    Outpatient Physical Therapy 1 times weekly for 6 weeks to include the following interventions: Cervical/Lumbar Traction, Electrical Stimulation as indicated , Gait Training, Manual Therapy, Neuromuscular Re-ed, Patient Education, Self Care, Therapeutic Activities, Therapeutic Exercise, and Dry needling.     Toney Brown, PT        Physician's Signature: _________________________________________ Date: ________________

## 2024-02-22 ENCOUNTER — CLINICAL SUPPORT (OUTPATIENT)
Dept: REHABILITATION | Facility: HOSPITAL | Age: 44
End: 2024-02-22
Payer: COMMERCIAL

## 2024-02-22 DIAGNOSIS — M79.7 FIBROMYALGIA: Primary | ICD-10-CM

## 2024-02-22 PROCEDURE — 97140 MANUAL THERAPY 1/> REGIONS: CPT | Mod: PO

## 2024-02-22 PROCEDURE — 97112 NEUROMUSCULAR REEDUCATION: CPT | Mod: PO

## 2024-02-22 NOTE — PROGRESS NOTES
"  Physical Therapy Treatment Note     Name: Geetha Meyers  Clinic Number: [id number]    Therapy Diagnosis:   Encounter Diagnosis   Name Primary?    Fibromyalgia Yes     Physician: Mane Hernandez MD    Visit Date: 2/22/2024       Physician Orders: PT Eval and Treat   Medical Diagnosis from Referral: Fibromyalgia   Evaluation Date: 2/8/2024  Authorization Period Expiration: 12/31/2024  Plan of Care Expiration: 4/8/2024  Progress Note Due: 3/8/2024  Date of Surgery: NA  Visit # / Visits authorized: 2/20   FOTO: 0/ 3 NEED FOTO     Precautions: Standard      Time In: 4:05  Time Out: 4:52  Total Billable Time: 23 minutes    Subjective     Pt reports: Pt reports that she is OK, but her L shoulder is "driving her nuts"  She was compliant with home exercise program.  Response to previous treatment: pt was a little sore      Pain: 8/10  Location: left shoulder      Objective     Geetha received therapeutic exercises to develop strength, endurance, ROM, flexibility, and posture for 05 minutes including:  Open book x 10 with L UE    Geetha received the following manual therapy techniques: Joint mobilizations, Manual traction, and Soft tissue Mobilization were applied to the: Cx and thoracic spine and rib cage for 15 minutes, including:  Passive mobilization to the thoracic spine and rib cage  Soft tissue mobilization to the thoracic paraspinal muscles    Geetha participated in neuromuscular re-education activities to improve: Balance and Coordination for 20 minutes. The following activities were included:  Supine B shoulder horizontal abduction 10 x 3 with yellow TB  Supine scapular retractions 10 x 2  Supine No Money 10 x 2 with yellow TB with loops    Geetha participated in dynamic functional therapeutic activities to improve functional performance for 00  minutes, including:      Geetha participated in gait training to improve functional mobility and safety for 00  minutes, including:      Home Exercises Provided and Patient Education " Provided     Education provided:   - added No Money to HEP Pt given yellow TB with loops to keep stress off her hands    Written Home Exercises Provided: Patient instructed to cont prior HEP.  Exercises were reviewed and Geetha was able to demonstrate them prior to the end of the session.  Geetha demonstrated good  understanding of the education provided.     See EMR under for exercises provided  pt was seen in September - November of last year for the same Dx.  She is to continue with her previous HEP  .    Assessment     Pt continues to have discomfort in her L upper quarter.  She was able to tolerate exercises and manual interventions with some discomfort today.  Geetha Is progressing well towards her goals.   Pt prognosis is Good.     Pt will continue to benefit from skilled outpatient physical therapy to address the deficits listed in the problem list box on initial evaluation, provide pt/family education and to maximize pt's level of independence in the home and community environment.     Pt's spiritual, cultural and educational needs considered and pt agreeable to plan of care and goals.     Anticipated barriers to physical therapy: compliance    Goals:   Short Term Goals: 4 weeks   Pt will be instructed in an exercise program to address functional deficits related to her Sx.  Decrease Pt's pain to </= 7/10     Long Term Goals: 8 weeks   Pt will be independent in an exercise program to assist in managing her Sx.  Decrease Pt'a pain to </= 5/10  Plan     Progress Physical Therapy program to assist Pt with managing her Sx.    Toney Brown, PT

## 2024-02-28 ENCOUNTER — CLINICAL SUPPORT (OUTPATIENT)
Dept: REHABILITATION | Facility: HOSPITAL | Age: 44
End: 2024-02-28
Payer: COMMERCIAL

## 2024-02-28 DIAGNOSIS — M79.7 FIBROMYALGIA: Primary | ICD-10-CM

## 2024-02-28 PROCEDURE — 97112 NEUROMUSCULAR REEDUCATION: CPT | Mod: PO

## 2024-02-28 PROCEDURE — 97140 MANUAL THERAPY 1/> REGIONS: CPT | Mod: PO

## 2024-02-29 ENCOUNTER — LAB VISIT (OUTPATIENT)
Dept: LAB | Facility: HOSPITAL | Age: 44
End: 2024-02-29
Attending: HOSPITALIST
Payer: COMMERCIAL

## 2024-02-29 ENCOUNTER — OFFICE VISIT (OUTPATIENT)
Dept: INTERNAL MEDICINE | Facility: CLINIC | Age: 44
End: 2024-02-29
Payer: COMMERCIAL

## 2024-02-29 VITALS
SYSTOLIC BLOOD PRESSURE: 130 MMHG | HEIGHT: 63 IN | BODY MASS INDEX: 51.91 KG/M2 | RESPIRATION RATE: 20 BRPM | DIASTOLIC BLOOD PRESSURE: 88 MMHG | OXYGEN SATURATION: 98 % | WEIGHT: 293 LBS | HEART RATE: 110 BPM | TEMPERATURE: 98 F

## 2024-02-29 DIAGNOSIS — E66.01 CLASS 3 SEVERE OBESITY WITH SERIOUS COMORBIDITY AND BODY MASS INDEX (BMI) OF 50.0 TO 59.9 IN ADULT, UNSPECIFIED OBESITY TYPE: ICD-10-CM

## 2024-02-29 DIAGNOSIS — Z01.818 PREOP EXAMINATION: ICD-10-CM

## 2024-02-29 DIAGNOSIS — Z01.818 PREOP EXAMINATION: Primary | ICD-10-CM

## 2024-02-29 DIAGNOSIS — I10 PRIMARY HYPERTENSION: ICD-10-CM

## 2024-02-29 DIAGNOSIS — J32.9 SINUSITIS, UNSPECIFIED CHRONICITY, UNSPECIFIED LOCATION: ICD-10-CM

## 2024-02-29 LAB
ALBUMIN SERPL BCP-MCNC: 3.5 G/DL (ref 3.5–5.2)
ALP SERPL-CCNC: 127 U/L (ref 55–135)
ALT SERPL W/O P-5'-P-CCNC: 9 U/L (ref 10–44)
ANION GAP SERPL CALC-SCNC: 12 MMOL/L (ref 8–16)
AST SERPL-CCNC: 13 U/L (ref 10–40)
BASOPHILS # BLD AUTO: 0.03 K/UL (ref 0–0.2)
BASOPHILS NFR BLD: 0.4 % (ref 0–1.9)
BILIRUB SERPL-MCNC: 0.6 MG/DL (ref 0.1–1)
BUN SERPL-MCNC: 9 MG/DL (ref 6–20)
CALCIUM SERPL-MCNC: 8.9 MG/DL (ref 8.7–10.5)
CHLORIDE SERPL-SCNC: 107 MMOL/L (ref 95–110)
CO2 SERPL-SCNC: 20 MMOL/L (ref 23–29)
CREAT SERPL-MCNC: 0.8 MG/DL (ref 0.5–1.4)
DIFFERENTIAL METHOD BLD: ABNORMAL
EOSINOPHIL # BLD AUTO: 0 K/UL (ref 0–0.5)
EOSINOPHIL NFR BLD: 0.1 % (ref 0–8)
ERYTHROCYTE [DISTWIDTH] IN BLOOD BY AUTOMATED COUNT: 14.5 % (ref 11.5–14.5)
EST. GFR  (NO RACE VARIABLE): >60 ML/MIN/1.73 M^2
GLUCOSE SERPL-MCNC: 89 MG/DL (ref 70–110)
HCT VFR BLD AUTO: 40.9 % (ref 37–48.5)
HGB BLD-MCNC: 13 G/DL (ref 12–16)
IMM GRANULOCYTES # BLD AUTO: 0.03 K/UL (ref 0–0.04)
IMM GRANULOCYTES NFR BLD AUTO: 0.4 % (ref 0–0.5)
INR PPP: 1 (ref 0.8–1.2)
LYMPHOCYTES # BLD AUTO: 2.9 K/UL (ref 1–4.8)
LYMPHOCYTES NFR BLD: 35.8 % (ref 18–48)
MCH RBC QN AUTO: 29.1 PG (ref 27–31)
MCHC RBC AUTO-ENTMCNC: 31.8 G/DL (ref 32–36)
MCV RBC AUTO: 92 FL (ref 82–98)
MONOCYTES # BLD AUTO: 0.5 K/UL (ref 0.3–1)
MONOCYTES NFR BLD: 6.2 % (ref 4–15)
NEUTROPHILS # BLD AUTO: 4.6 K/UL (ref 1.8–7.7)
NEUTROPHILS NFR BLD: 57.1 % (ref 38–73)
NRBC BLD-RTO: 0 /100 WBC
OHS QRS DURATION: 74 MS
OHS QTC CALCULATION: 646 MS
PLATELET # BLD AUTO: 414 K/UL (ref 150–450)
PMV BLD AUTO: 10.8 FL (ref 9.2–12.9)
POTASSIUM SERPL-SCNC: 3.9 MMOL/L (ref 3.5–5.1)
PROT SERPL-MCNC: 7.7 G/DL (ref 6–8.4)
PROTHROMBIN TIME: 10.9 SEC (ref 9–12.5)
RBC # BLD AUTO: 4.46 M/UL (ref 4–5.4)
SODIUM SERPL-SCNC: 139 MMOL/L (ref 136–145)
WBC # BLD AUTO: 8.07 K/UL (ref 3.9–12.7)

## 2024-02-29 PROCEDURE — 80053 COMPREHEN METABOLIC PANEL: CPT | Performed by: HOSPITALIST

## 2024-02-29 PROCEDURE — 3079F DIAST BP 80-89 MM HG: CPT | Mod: CPTII,S$GLB,, | Performed by: HOSPITALIST

## 2024-02-29 PROCEDURE — 85025 COMPLETE CBC W/AUTO DIFF WBC: CPT | Performed by: HOSPITALIST

## 2024-02-29 PROCEDURE — 93010 ELECTROCARDIOGRAM REPORT: CPT | Mod: S$GLB,,, | Performed by: INTERNAL MEDICINE

## 2024-02-29 PROCEDURE — 85610 PROTHROMBIN TIME: CPT | Performed by: HOSPITALIST

## 2024-02-29 PROCEDURE — 99214 OFFICE O/P EST MOD 30 MIN: CPT | Mod: S$GLB,,, | Performed by: HOSPITALIST

## 2024-02-29 PROCEDURE — 3008F BODY MASS INDEX DOCD: CPT | Mod: CPTII,S$GLB,, | Performed by: HOSPITALIST

## 2024-02-29 PROCEDURE — 3075F SYST BP GE 130 - 139MM HG: CPT | Mod: CPTII,S$GLB,, | Performed by: HOSPITALIST

## 2024-02-29 PROCEDURE — 93005 ELECTROCARDIOGRAM TRACING: CPT | Mod: S$GLB,,, | Performed by: HOSPITALIST

## 2024-02-29 PROCEDURE — 36415 COLL VENOUS BLD VENIPUNCTURE: CPT | Mod: PO | Performed by: HOSPITALIST

## 2024-02-29 PROCEDURE — 99999 PR PBB SHADOW E&M-EST. PATIENT-LVL III: CPT | Mod: PBBFAC,,, | Performed by: HOSPITALIST

## 2024-02-29 RX ORDER — AZITHROMYCIN 250 MG/1
TABLET, FILM COATED ORAL
Qty: 6 TABLET | Refills: 0 | Status: SHIPPED | OUTPATIENT
Start: 2024-02-29 | End: 2024-03-05

## 2024-02-29 RX ORDER — ATORVASTATIN CALCIUM 20 MG/1
1 TABLET, FILM COATED ORAL DAILY
COMMUNITY
Start: 2023-12-22

## 2024-02-29 RX ORDER — BENZONATATE 100 MG/1
100 CAPSULE ORAL EVERY 6 HOURS PRN
Qty: 30 CAPSULE | Refills: 0 | Status: SHIPPED | OUTPATIENT
Start: 2024-02-29 | End: 2025-02-28

## 2024-02-29 NOTE — PROGRESS NOTES
Subjective:     @Patient ID: Geetha Meyers is a 43 y.o. female.    Chief Complaint: Pre-op Exam    HPI  43 y.o. female with hypertension, hyperlipidemia, prediabetes, rheumatoid arthritis, obesity, fibromyalgia presents for preop evaluation. Planning to have gastric surgery with [facility]. Plans to also have hiatal hernia repair     History of prior anesthetic complications: no  Chronic Steroid usage: no  no tobacco, occasional EtOH, no Illicit substances    Pt also reports having chronic cough and sinus pressure, ear pain and HA x 3 weeks. Has been taking zyrtec but no relief      Surgical Risk Assessment     Active cardiac issues:  Active decompensated heart failure? No   Unstable angina?  No   Significant uncontrolled arrhythmias? No   Severe valvular heart disease-Aortic or Mitral Stenosis? No   Recent MI or coronary revascularization < 30 days? No     Clinical risk factors predicting perioperative major adverse cardiac events per RCRI  High risk surgery (suprainguinal vascular, intraperitoneal, or intrathoracic surgery)? No   History of CAD/ischemic heart disease? No   History of cerebrovascular disease (CVA or TIA)? No   History of compensated heart failure? No   Type 2 diabetes requiring insulin? No   Serum Creatinine > 2? No   Total cardiac risk factors 0     0 predictors = 0.4%, 1 predictor = 0.9%, 2 predictors = 6.6%, ?3 predictors = >11%    According to the revised cardiac risk index, the risk of isabelle-procedural major cardiac complications (cardiac death, nonfatal MI, nonfatal cardiac arrest, postoperative cardiogenic pulmonary edema, complete heart block) is: 0.4%     If patient has a low risk of MACE (<1%), proceed to surgery. If the patient is at elevated risk of MACE, then determine functional capacity (pt reported activity or DASI model).     If the patient has moderate, good, or excellent functional capacity (?4 METs), then proceed to surgery without further evaluation. If patient has  poor or unknown functional capacity, will further testing impact decision making or perioperative care? If yes, then pharmacological stress testing is appropriate. In those patients with unknown functional capacity, exercise stress testing may be reasonable to perform.     Patient's functional mets: < 4 METS     < 4 METs -unable to walk > 2 blocks on level ground without stopping due to symptoms  - eating, dressing, toileting, walking indoors, light housework. POOR   > 4 METs -climbing > 1 flight of stairs without stopping  -walking up hill > 1-2 blocks  -scrubbing floors  -moving furniture  - golf, bowling, dancing or tennis  -running short distance MODERATE to EXCELLENT     OR   [email or web address]      Review of Systems   Constitutional:  Negative for chills and fever.   HENT:  Positive for postnasal drip and sinus pressure. Negative for congestion and sore throat.    Eyes:  Negative for pain and visual disturbance.   Respiratory:  Positive for cough. Negative for shortness of breath.    Cardiovascular:  Positive for leg swelling (chronic). Negative for chest pain.   Gastrointestinal:  Negative for abdominal pain, nausea and vomiting.   Endocrine: Negative for polydipsia and polyuria.   Genitourinary:  Negative for difficulty urinating and dysuria.   Musculoskeletal:  Negative for arthralgias and back pain.   Skin:  Negative for rash and wound.   Neurological:  Positive for headaches. Negative for dizziness and weakness.   Psychiatric/Behavioral:  Negative for agitation and confusion.      Past medical history, surgical history, and family medical history reviewed and updated as appropriate.    Medications and allergies reviewed.     Objective:     There were no vitals filed for this visit.  There is no height or weight on file to calculate BMI.  Physical Exam  Constitutional:       Appearance: Normal appearance.   HENT:      Head: Normocephalic and atraumatic.      Right Ear:  Tympanic membrane is bulging.      Left Ear: Tympanic membrane normal.      Mouth/Throat:      Mouth: Mucous membranes are moist.      Pharynx: No oropharyngeal exudate.   Eyes:      General:         Right eye: No discharge.         Left eye: No discharge.      Conjunctiva/sclera: Conjunctivae normal.   Cardiovascular:      Rate and Rhythm: Normal rate and regular rhythm.      Heart sounds: No murmur heard.  Pulmonary:      Effort: Pulmonary effort is normal.      Breath sounds: Normal breath sounds.   Abdominal:      General: Bowel sounds are normal. There is no distension.      Palpations: Abdomen is soft.      Tenderness: There is no abdominal tenderness.   Musculoskeletal:         General: Normal range of motion.      Cervical back: Normal range of motion and neck supple.      Comments: +mild BLE edema    Skin:     General: Skin is warm and dry.   Neurological:      Mental Status: She is alert and oriented to person, place, and time.   Psychiatric:         Mood and Affect: Mood normal.         Behavior: Behavior normal.       Lab Results   Component Value Date    WBC 7.3 11/08/2023    HGB 13.4 11/08/2023    HCT 41.0 11/08/2023     11/08/2023    CHOL 244 (H) 11/08/2023    TRIG 162 (H) 11/08/2023    HDL 43 11/08/2023    ALT 7 (L) 03/14/2023    AST 14 03/14/2023     03/14/2023    K 4.3 03/14/2023     03/14/2023    CREATININE 0.9 03/14/2023    BUN 8 03/14/2023    CO2 23 03/14/2023    TSH 1.380 11/08/2023    HGBA1C 6.0 (H) 11/08/2023       Assessment:     1. Preop examination    2. Class 3 severe obesity with serious comorbidity and body mass index (BMI) of 50.0 to 59.9 in adult, unspecified obesity type    3. Primary hypertension    4. Sinusitis, unspecified chronicity, unspecified location      Plan:   Geetha was seen today for pre-op exam.    Diagnoses and all orders for this visit:    Preop examination  -     CBC W/ AUTO DIFFERENTIAL; Future  -     IN OFFICE EKG 12-LEAD (to Muse)  -      COMPREHENSIVE METABOLIC PANEL; Future  -     PROTIME-INR; Future    Class 3 severe obesity with serious comorbidity and body mass index (BMI) of 50.0 to 59.9 in adult, unspecified obesity type  -     CBC W/ AUTO DIFFERENTIAL; Future  -     COMPREHENSIVE METABOLIC PANEL; Future  -     PROTIME-INR; Future    Primary hypertension  - Stable. Continue home meds for now  - counseled that as she loses weight from the gastric surgery, her bp may improve and will likely need to reduce dose       Sinusitis, unspecified chronicity, unspecified location  - Pt to start Zpak. Refill tessalon perles   -     benzonatate (TESSALON PERLES) 100 MG capsule; Take 1 capsule (100 mg total) by mouth every 6 (six) hours as needed for Cough.    Other orders  -     azithromycin (Z-JENNIFER) 250 MG tablet; Take 2 tablets by mouth on day 1; Take 1 tablet by mouth on days 2-5             Lisa Mike MD  Internal Medicine    2/29/2024

## 2024-03-03 ENCOUNTER — TELEPHONE (OUTPATIENT)
Dept: PHARMACY | Facility: CLINIC | Age: 44
End: 2024-03-03
Payer: COMMERCIAL

## 2024-03-13 DIAGNOSIS — Z01.818 PREOP EXAMINATION: ICD-10-CM

## 2024-03-13 DIAGNOSIS — R94.31 QT PROLONGATION: Primary | ICD-10-CM

## 2024-03-13 DIAGNOSIS — R94.31 ABNORMAL EKG: ICD-10-CM

## 2024-03-15 ENCOUNTER — TELEPHONE (OUTPATIENT)
Dept: INTERNAL MEDICINE | Facility: CLINIC | Age: 44
End: 2024-03-15

## 2024-03-15 ENCOUNTER — PATIENT MESSAGE (OUTPATIENT)
Dept: INTERNAL MEDICINE | Facility: CLINIC | Age: 44
End: 2024-03-15
Payer: COMMERCIAL

## 2024-03-15 ENCOUNTER — OFFICE VISIT (OUTPATIENT)
Dept: URGENT CARE | Facility: CLINIC | Age: 44
End: 2024-03-15
Payer: COMMERCIAL

## 2024-03-15 VITALS
OXYGEN SATURATION: 93 % | HEART RATE: 90 BPM | TEMPERATURE: 99 F | HEIGHT: 63 IN | WEIGHT: 293 LBS | RESPIRATION RATE: 20 BRPM | SYSTOLIC BLOOD PRESSURE: 138 MMHG | DIASTOLIC BLOOD PRESSURE: 78 MMHG | BODY MASS INDEX: 51.91 KG/M2

## 2024-03-15 DIAGNOSIS — R51.9 FRONTAL HEADACHE: ICD-10-CM

## 2024-03-15 DIAGNOSIS — R05.1 ACUTE COUGH: Primary | ICD-10-CM

## 2024-03-15 DIAGNOSIS — J30.2 SEASONAL ALLERGIC RHINITIS, UNSPECIFIED TRIGGER: ICD-10-CM

## 2024-03-15 DIAGNOSIS — R09.82 PND (POST-NASAL DRIP): ICD-10-CM

## 2024-03-15 LAB
CTP QC/QA: YES
SARS-COV-2 AG RESP QL IA.RAPID: NEGATIVE

## 2024-03-15 PROCEDURE — 99214 OFFICE O/P EST MOD 30 MIN: CPT | Mod: 25,S$GLB,, | Performed by: NURSE PRACTITIONER

## 2024-03-15 PROCEDURE — 71046 X-RAY EXAM CHEST 2 VIEWS: CPT | Mod: FY,S$GLB,, | Performed by: RADIOLOGY

## 2024-03-15 PROCEDURE — 94640 AIRWAY INHALATION TREATMENT: CPT | Mod: S$GLB,,, | Performed by: NURSE PRACTITIONER

## 2024-03-15 PROCEDURE — 87811 SARS-COV-2 COVID19 W/OPTIC: CPT | Mod: QW,S$GLB,, | Performed by: NURSE PRACTITIONER

## 2024-03-15 RX ORDER — ACETAMINOPHEN 500 MG
1000 TABLET ORAL
Status: COMPLETED | OUTPATIENT
Start: 2024-03-15 | End: 2024-03-15

## 2024-03-15 RX ORDER — ALBUTEROL SULFATE 0.83 MG/ML
2.5 SOLUTION RESPIRATORY (INHALATION)
Status: COMPLETED | OUTPATIENT
Start: 2024-03-15 | End: 2024-03-15

## 2024-03-15 RX ORDER — IPRATROPIUM BROMIDE 21 UG/1
1 SPRAY, METERED NASAL 2 TIMES DAILY
Qty: 30 ML | Refills: 0 | Status: SHIPPED | OUTPATIENT
Start: 2024-03-15 | End: 2024-03-22

## 2024-03-15 RX ORDER — ALBUTEROL SULFATE 90 UG/1
2 AEROSOL, METERED RESPIRATORY (INHALATION) EVERY 6 HOURS PRN
Qty: 18 G | Refills: 0 | Status: SHIPPED | OUTPATIENT
Start: 2024-03-15 | End: 2025-03-15

## 2024-03-15 RX ORDER — BENZONATATE 200 MG/1
200 CAPSULE ORAL 3 TIMES DAILY PRN
Qty: 30 CAPSULE | Refills: 0 | Status: SHIPPED | OUTPATIENT
Start: 2024-03-15 | End: 2024-03-25

## 2024-03-15 RX ADMIN — ALBUTEROL SULFATE 2.5 MG: 0.83 SOLUTION RESPIRATORY (INHALATION) at 05:03

## 2024-03-15 RX ADMIN — Medication 1000 MG: at 06:03

## 2024-03-15 NOTE — PROGRESS NOTES
"Subjective:      Patient ID: Geetha Meyers is a 43 y.o. female.    Vitals:  height is 5' 3" (1.6 m) and weight is 138.3 kg (305 lb) (abnormal). Her oral temperature is 99.2 °F (37.3 °C). Her blood pressure is 138/78 and her pulse is 90. Her respiration is 20 and oxygen saturation is 93% (abnormal).     Chief Complaint: Cough    This is a 43 y.o. female who presents today with a chief complaint of cough. Patient has had a cough for about three. Patient has taken a Z-anna and tessalon.  Cough was relieved a little  and became worse on Wednesday.    Provider note begins below:    Patient seen by primary care provider on 02/29/2024 for preop exam and was noted to have cough and sinusitis at that time and given azithromycin and Tessalon Perles.  Cough has persisted since that time and is now causing headache, chest discomfort and sore throat.  Patient feels cough has worsened.  No history of asthma or COPD.    Cough  This is a new problem. The current episode started 1 to 4 weeks ago. The problem has been gradually worsening. The problem occurs constantly. The cough is Non-productive. Associated symptoms include chest pain, headaches and a sore throat. Nothing aggravates the symptoms. She has tried prescription cough suppressant (Z-anna) for the symptoms. The treatment provided mild relief.       HENT:  Positive for sore throat.    Cardiovascular:  Positive for chest pain.   Respiratory:  Positive for cough.    Neurological:  Positive for headaches.      Objective:     Physical Exam   Constitutional: She is oriented to person, place, and time. She appears well-developed. She is cooperative.  Non-toxic appearance. She does not appear ill.      Comments:Patient appears uncomfortable   obesity  HENT:   Head: Normocephalic and atraumatic.   Ears:   Right Ear: Hearing, tympanic membrane, external ear and ear canal normal.   Left Ear: Hearing, tympanic membrane, external ear and ear canal normal.   Nose: Rhinorrhea present. No " mucosal edema or nasal deformity. No epistaxis.   Mouth/Throat: Uvula is midline, oropharynx is clear and moist and mucous membranes are normal. No trismus in the jaw. Normal dentition. No uvula swelling. Cobblestoning present. No oropharyngeal exudate, posterior oropharyngeal edema or posterior oropharyngeal erythema.   Eyes: Conjunctivae and lids are normal. No scleral icterus.   Neck: Trachea normal and phonation normal. Neck supple. No JVD present. No edema present. No erythema present. No neck rigidity present.   Cardiovascular: Normal rate, regular rhythm, normal heart sounds and normal pulses.   Pulmonary/Chest: Effort normal. No stridor. No respiratory distress. She has decreased breath sounds. She has no wheezes. She has no rhonchi. She has no rales.   Musculoskeletal: Normal range of motion.         General: No deformity. Normal range of motion.   Neurological: She is alert and oriented to person, place, and time. She exhibits normal muscle tone. Coordination normal.   Skin: Skin is warm, dry, intact, not diaphoretic and not pale.   Psychiatric: Her speech is normal and behavior is normal. Judgment and thought content normal.   Nursing note and vitals reviewed.    X-Ray Chest PA And Lateral    Result Date: 3/15/2024  EXAMINATION: XR CHEST PA AND LATERAL CLINICAL HISTORY: Acute cough TECHNIQUE: PA and lateral views of the chest were performed. COMPARISON: 01/31/2023 FINDINGS: The cardiomediastinal silhouette is not enlarged, magnified by technique..  There is no pleural effusion.  The trachea is midline.  The lungs are symmetrically expanded bilaterally with coarse interstitial attenuation, accentuated by habitus.  There is left basilar subsegmental atelectasis..  No large focal consolidation seen.  There is no pneumothorax.  The osseous structures are remarkable for degenerative change..     1. Interstitial findings are accentuated by habitus, no large focal consolidation. Electronically signed by: Cayetano  MD Juan Diego Date:    03/15/2024 Time:    17:35     Results for orders placed or performed in visit on 03/15/24   SARS Coronavirus 2 Antigen, POCT Manual Read   Result Value Ref Range    SARS Coronavirus 2 Antigen Negative Negative     Acceptable Yes        Assessment:     1. Acute cough    2. PND (post-nasal drip)    3. Seasonal allergic rhinitis, unspecified trigger    4. Frontal headache        Plan:   I discussed the disposition of this patient with the medical director, Dr. Quintero, and she agree with treatment plan.    Acute cough  -     SARS Coronavirus 2 Antigen, POCT Manual Read  -     X-Ray Chest PA And Lateral; Future; Expected date: 03/15/2024  -     albuterol nebulizer solution 2.5 mg  -     albuterol (VENTOLIN HFA) 90 mcg/actuation inhaler; Inhale 2 puffs into the lungs every 6 (six) hours as needed for Wheezing. Rescue  Dispense: 18 g; Refill: 0  -     benzonatate (TESSALON) 200 MG capsule; Take 1 capsule (200 mg total) by mouth 3 (three) times daily as needed for Cough.  Dispense: 30 capsule; Refill: 0    PND (post-nasal drip)  -     ipratropium (ATROVENT) 21 mcg (0.03 %) nasal spray; 1 spray by Each Nostril route 2 (two) times daily. for 7 days  Dispense: 30 mL; Refill: 0    Seasonal allergic rhinitis, unspecified trigger    Frontal headache  -     acetaminophen tablet 1,000 mg      Patient Instructions   Please continue taking your typical allergy medication including Flonase and oral antihistamine.    If you have chest pain or shortness of breath or great difficulty breathing please call 911 and go immediately to the emergency department as we discussed.

## 2024-03-15 NOTE — TELEPHONE ENCOUNTER
Called patient. Got more information on her symptoms: She has been experiencing severe cough, sore throat, migraine, fluid in the ears, and shortness of breath. She has no fever. The symptoms have been for 3 weeks to a month. The z-pack helped, but once the medication ran out it worsened once again.     Advised patient to go to Urgent care/ER due to shortness of breath.

## 2024-03-15 NOTE — TELEPHONE ENCOUNTER
Called patient. No answer, left a message requesting a call back.     Patient is experiencing a progressive, severely painful cough and a migraine after completing prescribed medication (presumably Z-PACK, thee patient did not specify). The patient is requesting a new medication to help.

## 2024-03-15 NOTE — TELEPHONE ENCOUNTER
----- Message from Jessie Dugan sent at 3/15/2024  4:05 PM CDT -----  Contact: [phone] pt  Type: Returning a call    Who left a message?Naldo Tyler MA    When did the practice call?today    Comments:

## 2024-03-15 NOTE — PATIENT INSTRUCTIONS
Please continue taking your typical allergy medication including Flonase and oral antihistamine.    If you have chest pain or shortness of breath or great difficulty breathing please call 911 and go immediately to the emergency department as we discussed.

## 2024-03-27 ENCOUNTER — CLINICAL SUPPORT (OUTPATIENT)
Dept: REHABILITATION | Facility: HOSPITAL | Age: 44
End: 2024-03-27
Payer: COMMERCIAL

## 2024-03-27 DIAGNOSIS — M79.7 FIBROMYALGIA: Primary | ICD-10-CM

## 2024-03-27 PROCEDURE — 97530 THERAPEUTIC ACTIVITIES: CPT | Mod: PO

## 2024-03-27 PROCEDURE — 97112 NEUROMUSCULAR REEDUCATION: CPT | Mod: PO

## 2024-04-01 ENCOUNTER — PATIENT MESSAGE (OUTPATIENT)
Dept: INTERNAL MEDICINE | Facility: CLINIC | Age: 44
End: 2024-04-01
Payer: COMMERCIAL

## 2024-04-02 ENCOUNTER — OFFICE VISIT (OUTPATIENT)
Dept: INTERNAL MEDICINE | Facility: CLINIC | Age: 44
End: 2024-04-02
Payer: COMMERCIAL

## 2024-04-02 VITALS
DIASTOLIC BLOOD PRESSURE: 80 MMHG | RESPIRATION RATE: 18 BRPM | SYSTOLIC BLOOD PRESSURE: 130 MMHG | OXYGEN SATURATION: 99 % | BODY MASS INDEX: 51.91 KG/M2 | HEART RATE: 95 BPM | HEIGHT: 63 IN | TEMPERATURE: 98 F | WEIGHT: 293 LBS

## 2024-04-02 DIAGNOSIS — H92.09 OTALGIA, UNSPECIFIED LATERALITY: ICD-10-CM

## 2024-04-02 DIAGNOSIS — I10 PRIMARY HYPERTENSION: ICD-10-CM

## 2024-04-02 DIAGNOSIS — J40 BRONCHITIS: Primary | ICD-10-CM

## 2024-04-02 PROCEDURE — 3079F DIAST BP 80-89 MM HG: CPT | Mod: CPTII,S$GLB,, | Performed by: HOSPITALIST

## 2024-04-02 PROCEDURE — 1160F RVW MEDS BY RX/DR IN RCRD: CPT | Mod: CPTII,S$GLB,, | Performed by: HOSPITALIST

## 2024-04-02 PROCEDURE — 3075F SYST BP GE 130 - 139MM HG: CPT | Mod: CPTII,S$GLB,, | Performed by: HOSPITALIST

## 2024-04-02 PROCEDURE — 1159F MED LIST DOCD IN RCRD: CPT | Mod: CPTII,S$GLB,, | Performed by: HOSPITALIST

## 2024-04-02 PROCEDURE — 99214 OFFICE O/P EST MOD 30 MIN: CPT | Mod: S$GLB,,, | Performed by: HOSPITALIST

## 2024-04-02 PROCEDURE — 99999 PR PBB SHADOW E&M-EST. PATIENT-LVL IV: CPT | Mod: PBBFAC,,, | Performed by: HOSPITALIST

## 2024-04-02 PROCEDURE — 3008F BODY MASS INDEX DOCD: CPT | Mod: CPTII,S$GLB,, | Performed by: HOSPITALIST

## 2024-04-02 RX ORDER — NEOMYCIN SULFATE, POLYMYXIN B SULFATE AND HYDROCORTISONE 10; 3.5; 1 MG/ML; MG/ML; [USP'U]/ML
4 SUSPENSION/ DROPS AURICULAR (OTIC) 3 TIMES DAILY
Qty: 10 ML | Status: SHIPPED | OUTPATIENT
Start: 2024-04-02 | End: 2024-04-07

## 2024-04-02 RX ORDER — CODEINE PHOSPHATE AND GUAIFENESIN 10; 100 MG/5ML; MG/5ML
5 SOLUTION ORAL EVERY 6 HOURS PRN
Qty: 237 ML | Refills: 0 | Status: SHIPPED | OUTPATIENT
Start: 2024-04-02 | End: 2024-04-18 | Stop reason: SDUPTHER

## 2024-04-02 RX ORDER — PREDNISONE 20 MG/1
40 TABLET ORAL DAILY
Qty: 10 TABLET | Refills: 0 | Status: CANCELLED | OUTPATIENT
Start: 2024-04-02 | End: 2024-04-07

## 2024-04-02 NOTE — PROGRESS NOTES
Subjective:     @Patient ID: Geetha Meyers is a 43 y.o. female.    Chief Complaint: Cough, Nasal Congestion, Sore Throat, and Chest Congestion    HPI    43 y.o. female with hypertension, hyperlipidemia, prediabetes, rheumatoid arthritis, obesity, fibromyalgia presents for urgent visit with complaint of cough.  Patient was seen in urgent care on March 15th with similar symptoms.  Also seen by me 1 month ago for sinusitis.  At that time was prescribed Z-Michele and Tessalon Perles.  In urgent care, she was given albuterol in had a chest x-ray that showed coarse interstitial findings attributed to body habitus.  Otherwise no acute consolidation.  Reports cough is causing headache, chest pain and back pain. Reports ear is hurting.   Reports tried mucinex, nyquil, albuterol and tessalon perles,   Reports R ear pain and R facial pressure.       Review of Systems   Constitutional:  Negative for chills and fever.   Respiratory:  Positive for cough and wheezing.    Neurological:  Positive for headaches.     Past medical history, surgical history, and family medical history reviewed and updated as appropriate.    Medications and allergies reviewed.     Objective:     There were no vitals filed for this visit.  There is no height or weight on file to calculate BMI.  Physical Exam  Constitutional:       Appearance: Normal appearance.   HENT:      Head: Normocephalic and atraumatic.      Right Ear: Tympanic membrane is bulging.      Left Ear: Tympanic membrane is bulging.      Ears:      Comments: Clear fluid behind TM b/l     Mouth/Throat:      Mouth: Mucous membranes are moist.      Pharynx: No oropharyngeal exudate.   Eyes:      General:         Right eye: No discharge.         Left eye: No discharge.      Conjunctiva/sclera: Conjunctivae normal.   Cardiovascular:      Rate and Rhythm: Normal rate and regular rhythm.   Pulmonary:      Effort: Pulmonary effort is normal.      Breath sounds: Normal breath sounds.   Musculoskeletal:          General: Normal range of motion.      Cervical back: Normal range of motion and neck supple.   Skin:     General: Skin is warm and dry.   Neurological:      Mental Status: She is alert and oriented to person, place, and time.   Psychiatric:         Mood and Affect: Mood normal.         Behavior: Behavior normal.     Lab Results   Component Value Date    WBC 8.07 02/29/2024    HGB 13.0 02/29/2024    HCT 40.9 02/29/2024     02/29/2024    CHOL 244 (H) 11/08/2023    TRIG 162 (H) 11/08/2023    HDL 43 11/08/2023    ALT 9 (L) 02/29/2024    AST 13 02/29/2024     02/29/2024    K 3.9 02/29/2024     02/29/2024    CREATININE 0.8 02/29/2024    BUN 9 02/29/2024    CO2 20 (L) 02/29/2024    TSH 1.380 11/08/2023    INR 1.0 02/29/2024    HGBA1C 6.0 (H) 11/08/2023       Assessment:     1. Bronchitis    2. Otalgia, unspecified laterality    3. Primary hypertension      Plan:   Geetha was seen today for cough, nasal congestion, sore throat and chest congestion.    Diagnoses and all orders for this visit:    Bronchitis  -start cough syrup  - continue supportive care   - pt declines use of oral steroids    Otalgia, unspecified laterality  - start cortisporin   - counseled to use antihistamine and decongestant. Counseled to monitor bp if uses decongestant since she has htn      HTN  - see otalgia    Other orders  -     guaiFENesin-codeine 100-10 mg/5 ml (TUSSI-ORGANIDIN NR)  mg/5 mL syrup; Take 5 mLs by mouth every 6 (six) hours as needed for Cough or Congestion. May cause sedation  -     neomycin-polymyxin-hydrocortisone (CORTISPORIN) 3.5-10,000-1 mg/mL-unit/mL-% otic suspension; Place 4 drops into both ears 3 (three) times daily. For ear pain for 5 days            Lisa Mike MD  Internal Medicine    4/2/2024

## 2024-04-17 ENCOUNTER — TELEPHONE (OUTPATIENT)
Dept: INTERNAL MEDICINE | Facility: CLINIC | Age: 44
End: 2024-04-17
Payer: COMMERCIAL

## 2024-04-17 NOTE — TELEPHONE ENCOUNTER
Last seen 4/2/24 for bronchitis. The patient is still having Chest and throat pain.     Appointment scheduled for tomorrow.

## 2024-04-18 ENCOUNTER — OFFICE VISIT (OUTPATIENT)
Dept: INTERNAL MEDICINE | Facility: CLINIC | Age: 44
End: 2024-04-18
Payer: COMMERCIAL

## 2024-04-18 VITALS
DIASTOLIC BLOOD PRESSURE: 80 MMHG | OXYGEN SATURATION: 98 % | WEIGHT: 293 LBS | HEIGHT: 63 IN | BODY MASS INDEX: 51.91 KG/M2 | SYSTOLIC BLOOD PRESSURE: 128 MMHG | HEART RATE: 106 BPM | RESPIRATION RATE: 17 BRPM | TEMPERATURE: 98 F

## 2024-04-18 DIAGNOSIS — R05.9 COUGH, UNSPECIFIED TYPE: ICD-10-CM

## 2024-04-18 DIAGNOSIS — J30.9 ALLERGIC SINUSITIS: Primary | ICD-10-CM

## 2024-04-18 PROCEDURE — 3074F SYST BP LT 130 MM HG: CPT | Mod: CPTII,S$GLB,, | Performed by: INTERNAL MEDICINE

## 2024-04-18 PROCEDURE — 1159F MED LIST DOCD IN RCRD: CPT | Mod: CPTII,S$GLB,, | Performed by: INTERNAL MEDICINE

## 2024-04-18 PROCEDURE — 99999 PR PBB SHADOW E&M-EST. PATIENT-LVL IV: CPT | Mod: PBBFAC,,, | Performed by: INTERNAL MEDICINE

## 2024-04-18 PROCEDURE — 96372 THER/PROPH/DIAG INJ SC/IM: CPT | Mod: S$GLB,,, | Performed by: INTERNAL MEDICINE

## 2024-04-18 PROCEDURE — 3008F BODY MASS INDEX DOCD: CPT | Mod: CPTII,S$GLB,, | Performed by: INTERNAL MEDICINE

## 2024-04-18 PROCEDURE — 99214 OFFICE O/P EST MOD 30 MIN: CPT | Mod: 25,S$GLB,, | Performed by: INTERNAL MEDICINE

## 2024-04-18 PROCEDURE — 3079F DIAST BP 80-89 MM HG: CPT | Mod: CPTII,S$GLB,, | Performed by: INTERNAL MEDICINE

## 2024-04-18 RX ORDER — PREDNISONE 20 MG/1
TABLET ORAL
Qty: 10 TABLET | Refills: 0 | Status: SHIPPED | OUTPATIENT
Start: 2024-04-18 | End: 2024-04-24 | Stop reason: ALTCHOICE

## 2024-04-18 RX ORDER — TRIAMCINOLONE ACETONIDE 40 MG/ML
40 INJECTION, SUSPENSION INTRA-ARTICULAR; INTRAMUSCULAR
Status: COMPLETED | OUTPATIENT
Start: 2024-04-18 | End: 2024-04-18

## 2024-04-18 RX ORDER — CODEINE PHOSPHATE AND GUAIFENESIN 10; 100 MG/5ML; MG/5ML
5 SOLUTION ORAL EVERY 6 HOURS PRN
Qty: 237 ML | Refills: 0 | Status: SHIPPED | OUTPATIENT
Start: 2024-04-18

## 2024-04-18 RX ADMIN — TRIAMCINOLONE ACETONIDE 40 MG: 40 INJECTION, SUSPENSION INTRA-ARTICULAR; INTRAMUSCULAR at 08:04

## 2024-04-18 NOTE — PROGRESS NOTES
History of present illness   [age] year lady established patient of Dr Mike.  Presents today persisting respiratory symptoms which she is two months.  She has been seen twice urgent care and primary care physician.  First visit March 15 2nd visit  on 4/2/24.  Noted COVID negative.  Chest x-ray performed initial urgent care visit.  Being treated symptomatically and also received Zithromax pack most recent visit.  She reports symptoms have fluctuated essentially the same persistent cough only minimally productive at times.  Positive sinus congestion.  Itchy eyes.  No fever no chills.  She does have a history of seasonal allergies spring    Current medications:  Medications all noted reviewed.      Review of systems constitutional fever chills.    HEENT positive nasal congestion.  Ears feel full but no pain.  Scratchy throat.  Respiratory positive cough occasionally productive.  No overt shortness of breath.  No pleuritic chest pain.  No hemoptysis.  Cardiovascular: Denies chest pain palpitations or syncope.    GI:  No nausea no vomiting no diarrhea.      Physical examination:  General:  Alert female no acute distress.  Coughing frequently which sounds upper respiratory  Vital signs:  All noted and reviewed  Eyes:  Sclerae white nonicteric.    HEENT:  Mouth pharynx normal.  No facial asymmetry tenderness or redness.  Ear canals and tympanic membranes are normal.  No cervical adenopathy.  Trachea is midline and freely mobile.  Lungs:  Clear to auscultation.  No use of accessory muscles of respiration.    Cardiovascular:  Regular rate rhythm.  No significant murmur.      Data:  Reviewed recent chest x-ray      Impression:   Her current respiratory symptoms seen most consistent with allergic manifestations, possibly postinfectious inflammatory symptoms.      Plan:  She will continue her Atrovent nasal spray.    Continue oral antihistamine.    Refill her antitussive codeine preparation for nighttime use.     Course of systemic corticosteroids to start with 40 mg Kenalog IM today.  Tomorrow begin prednisone 40 mg daily for five days.  Advised without significant improvement or worsening symptoms.

## 2024-04-24 ENCOUNTER — OFFICE VISIT (OUTPATIENT)
Dept: CARDIOLOGY | Facility: CLINIC | Age: 44
End: 2024-04-24
Payer: COMMERCIAL

## 2024-04-24 VITALS
DIASTOLIC BLOOD PRESSURE: 87 MMHG | HEART RATE: 100 BPM | BODY MASS INDEX: 51.91 KG/M2 | WEIGHT: 293 LBS | HEIGHT: 63 IN | SYSTOLIC BLOOD PRESSURE: 119 MMHG

## 2024-04-24 DIAGNOSIS — Z01.810 PREOPERATIVE CARDIOVASCULAR EXAMINATION: ICD-10-CM

## 2024-04-24 DIAGNOSIS — E78.2 MIXED HYPERLIPIDEMIA: ICD-10-CM

## 2024-04-24 DIAGNOSIS — R07.89 OTHER CHEST PAIN: ICD-10-CM

## 2024-04-24 DIAGNOSIS — R94.31 ABNORMAL EKG: ICD-10-CM

## 2024-04-24 DIAGNOSIS — I10 PRIMARY HYPERTENSION: ICD-10-CM

## 2024-04-24 DIAGNOSIS — R06.02 SOB (SHORTNESS OF BREATH) ON EXERTION: ICD-10-CM

## 2024-04-24 DIAGNOSIS — Z01.818 PREOP EXAMINATION: Primary | ICD-10-CM

## 2024-04-24 DIAGNOSIS — E66.01 MORBID OBESITY WITH BMI OF 45.0-49.9, ADULT: ICD-10-CM

## 2024-04-24 DIAGNOSIS — R94.31 QT PROLONGATION: ICD-10-CM

## 2024-04-24 DIAGNOSIS — M45.9 RHEUMATOID ARTHRITIS INVOLVING VERTEBRA, UNSPECIFIED WHETHER RHEUMATOID FACTOR PRESENT: ICD-10-CM

## 2024-04-24 PROCEDURE — 3074F SYST BP LT 130 MM HG: CPT | Mod: CPTII,S$GLB,, | Performed by: INTERNAL MEDICINE

## 2024-04-24 PROCEDURE — 1159F MED LIST DOCD IN RCRD: CPT | Mod: CPTII,S$GLB,, | Performed by: INTERNAL MEDICINE

## 2024-04-24 PROCEDURE — 3008F BODY MASS INDEX DOCD: CPT | Mod: CPTII,S$GLB,, | Performed by: INTERNAL MEDICINE

## 2024-04-24 PROCEDURE — 3079F DIAST BP 80-89 MM HG: CPT | Mod: CPTII,S$GLB,, | Performed by: INTERNAL MEDICINE

## 2024-04-24 PROCEDURE — 93000 ELECTROCARDIOGRAM COMPLETE: CPT | Mod: S$GLB,,, | Performed by: INTERNAL MEDICINE

## 2024-04-24 PROCEDURE — 99205 OFFICE O/P NEW HI 60 MIN: CPT | Mod: 25,S$GLB,, | Performed by: INTERNAL MEDICINE

## 2024-04-24 PROCEDURE — 99999 PR PBB SHADOW E&M-EST. PATIENT-LVL IV: CPT | Mod: PBBFAC,,, | Performed by: INTERNAL MEDICINE

## 2024-04-24 NOTE — PATIENT INSTRUCTIONS
Assessment/Plan:  Geetha Meyers is a 43 y.o. female with HTN, HLD, morbid obesity, RA, fibromyalgia, who presents for an initial appointment.    Abnormal EKG/preoperative cardiac risk stratification prior to hernia surgery/panniculectomy- Pt with chest pain and SOB as described.  Pt with risk factors for CAD, including morbid obesity.  Check PET stress test and echo to complete preoperative cardiac risk stratification. Previous QT prolongation likely due to pregabalin, which pt is no longer taking.     2. HTN- Contniue current medications.    3. HLD- Continue atorvastatin 20 mg daily.    4. Morbid Obesity- Continue management per Bariatric Surgery.    Will call pt with results of stress test and echo  Otherwise, follow up in 5 months

## 2024-04-24 NOTE — PROGRESS NOTES
[facility]      Chief Complaint   Patient presents with    Pre-op Exam    Abnormal ECG       Patient ID: Geetha Meyers is a 43 y.o. female with HTN, HLD, morbid obesity, RA, fibromyalgia, who presents for an initial appointment. Pertinent history/events are as follows:     -Pt kindly referred by Dr. Mike for abnormal EKG/preoperative cardiac risk stratification prior to hernia surgery/panniculectomy.    HPI:  Mrs. Meyers reports chest pain and SOB.  States chest pain started 10 years ago.  States SOB has been progressive since gaining weight.  Reports family history of MI in paternal grandparents.  EKG today shows normal sinus rhythm with possible left atrial enlargement. EKG from 2024 shows sinus tachycardia with nonspecific ST/T wave changes, and prolonged QT interval. Pt states she recently stopped taking pregabalin.      Past Medical History:   Diagnosis Date    Fibromyalgia     HTN (hypertension)     Hyperlipidemia     Lower leg edema     Menorrhagia     Obesity     Prediabetes     Rheumatoid arthritis      Past Surgical History:   Procedure Laterality Date     SECTION      x2    ENDOMETRIAL ABLATION      HYSTERECTOMY  2022    LEFT OOPHORECTOMY       Social History     Socioeconomic History    Marital status:     Number of children: 2   Occupational History    Occupation: Security and Staffing business owner   Tobacco Use    Smoking status: Never    Smokeless tobacco: Never   Substance and Sexual Activity    Alcohol use: Yes     Comment: socially    Drug use: Never    Sexual activity: Yes     Social Determinants of Health     Financial Resource Strain: Medium Risk (2024)    Overall Financial Resource Strain (CARDIA)     Difficulty of Paying Living Expenses: Somewhat hard   Food Insecurity: No Food Insecurity (2024)    Hunger Vital Sign     Worried About Running Out of Food in the Last Year: Never true     Ran Out of Food in the Last Year: Never true    Transportation Needs: No Transportation Needs (2/28/2024)    PRAPARE - Transportation     Lack of Transportation (Medical): No     Lack of Transportation (Non-Medical): No   Physical Activity: Sufficiently Active (2/28/2024)    Exercise Vital Sign     Days of Exercise per Week: 5 days     Minutes of Exercise per Session: 30 min   Stress: Stress Concern Present (2/28/2024)    [place] citizen [place] [employer] - Occupational Stress Questionnaire     Feeling of Stress : Very much   Social Connections: Unknown (2/28/2024)    Social Connection and Isolation Panel [NHANES]     Frequency of Communication with Friends and Family: Twice a week     Frequency of Social Gatherings with Friends and Family: Never     Active Member of Clubs or Organizations: No     Attends Club or Organization Meetings: Never     Marital Status:    Housing Stability: High Risk (2/28/2024)    Housing Stability Vital Sign     Unable to Pay for Housing in the Last Year: Yes     Number of Places Lived in the Last Year: 1     Unstable Housing in the Last Year: No     Family History   Problem Relation Name Age of Onset    Alcohol abuse Mother Mutiple Family Members Both Sides     Arthritis Mother Mutiple Family Members Both Sides     Asthma Mother Mutiple Family Members Both Sides     Cancer Mother Mutiple Family Members Both Sides     Depression Mother Mutiple Family Members Both Sides     Diabetes Mother Mutiple Family Members Both Sides     Early death Mother Mutiple Family Members Both Sides     Heart disease Mother Mutiple Family Members Both Sides     Hyperlipidemia Mother Mutiple Family Members Both Sides     Hypertension Mother Mutiple Family Members Both Sides     Mental illness Mother Mutiple Family Members Both Sides     Stroke Mother Mutiple Family Members Both Sides     Alcohol abuse Father Mutiple Family Members Both Sides     Arthritis Father Mutiple Family Members Both Sides     Asthma Father Mutiple Family Members Both  Sides     Cancer Father Mutiple Family Members Both Sides     COPD Father Mutiple Family Members Both Sides     Depression Father Mutiple Family Members Both Sides     Diabetes Father Mutiple Family Members Both Sides     Drug abuse Father Mutiple Family Members Both Sides     Heart disease Father Mutiple Family Members Both Sides     Hyperlipidemia Father Mutiple Family Members Both Sides     Hypertension Father Mutiple Family Members Both Sides     Kidney disease Father Mutiple Family Members Both Sides     Mental illness Father Mutiple Family Members Both Sides     Miscarriages / Stillbirths Father Mutiple Family Members Both Sides     Stroke Father Mutiple Family Members Both Sides     Vision loss Father Mutiple Family Members Both Sides        Review of patient's allergies indicates:   Allergen Reactions    Amitriptyline hcl Diarrhea, Hives, Itching, Nausea Only, Rash, Shortness Of Breath and Swelling    Bactrim [sulfamethoxazole-trimethoprim]      Aseptic meningitis    Hydrobenzthiazide Diarrhea, Nausea Only, Palpitations, Photosensitivity, Rash and Swelling    Mold Diarrhea, Hives, Itching, Nausea And Vomiting, Rash, Shortness Of Breath and Swelling    Mushroom Anaphylaxis, Diarrhea, Edema, Hives, Itching, Nausea And Vomiting, Rash and Swelling    Sulfasalazine      Orange skin,nausea,diarrhea,h/a,ringing in the ears    Sulfa (sulfonamide antibiotics)     House dust Hives, Itching and Rash    Shea butter Hives, Itching and Rash    Wool Hives, Itching, Nausea Only, Rash and Swelling       Medication List with Changes/Refills   Current Medications    ACETAMINOPHEN (TYLENOL) 500 MG TABLET    Take 1 tablet (500 mg total) by mouth every 6 (six) hours as needed.    ALBUTEROL (VENTOLIN HFA) 90 MCG/ACTUATION INHALER    Inhale 2 puffs into the lungs every 6 (six) hours as needed for Wheezing. Rescue    AMLODIPINE (NORVASC) 5 MG TABLET    Take 5 mg by mouth once daily.    ATORVASTATIN (LIPITOR) 20 MG TABLET    Take 1  "tablet by mouth once daily.    BENZONATATE (TESSALON PERLES) 100 MG CAPSULE    Take 1 capsule (100 mg total) by mouth every 6 (six) hours as needed for Cough.    CETIRIZINE (ZYRTEC) 10 MG TABLET    Take 10 mg by mouth once daily.    ERGOCALCIFEROL, VITAMIN D2, (VITAMIN D2 ORAL)    Take 1 tablet by mouth once daily.    ETODOLAC (LODINE) 400 MG TABLET    Take 1 tablet (400 mg total) by mouth 2 (two) times daily.    FLUTICASONE PROPIONATE (FLONASE) 50 MCG/ACTUATION NASAL SPRAY    2 sprays (100 mcg total) by Each Nostril route 2 (two) times daily as needed for Rhinitis.    GUAIFENESIN-CODEINE 100-10 MG/5 ML (TUSSI-ORGANIDIN NR)  MG/5 ML SYRUP    Take 5 mLs by mouth every 6 (six) hours as needed for Cough or Congestion. May cause sedation    LEVOCETIRIZINE (XYZAL) 5 MG TABLET    Take 5 mg by mouth daily as needed for Allergies.     LIDOCAINE (LIDODERM) 5 %    Place 1 patch onto the skin once daily. Remove & Discard patch within 12 hours or as directed by MD    ONDANSETRON (ZOFRAN) 4 MG TABLET    Take 4 mg by mouth every 4 (four) hours as needed.    THIAMINE MONONITRATE, VIT B1, (VITAMIN B-1, MONONITRATE,) 100 MG TAB    1 tablet Orally Once a day for 30 day(s)   Discontinued Medications    PREDNISONE (DELTASONE) 20 MG TABLET    2 po qd       Review of Systems  Constitution: Denies chills, fever, and sweats.  HENT: Denies headaches or blurry vision.  Cardiovascular: Positive for chest pain and SOB.   Respiratory: Denies cough or shortness of breath.  Gastrointestinal: Denies abdominal pain, nausea, or vomiting.  Musculoskeletal: Denies muscle cramps.  Neurological: Denies dizziness or focal weakness.  Psychiatric/Behavioral: Normal mental status.  Hematologic/Lymphatic: Denies bleeding problem or easy bruising/bleeding.  Skin: Denies rash or suspicious lesions    Physical Examination  /87   Pulse 100   Ht 5' 3" (1.6 m)   Wt 135 kg (297 lb 9.9 oz)   BMI 52.72 kg/m²     Constitutional: No acute distress, " "conversant  HEENT: Sclera anicteric, Pupils equal, round and reactive to light, extraocular motions intact, Oropharynx clear  Neck: No JVD, no carotid bruits  Cardiovascular: regular rate and rhythm, no murmur, rubs or gallops, normal S1/S2  Pulmonary: Clear to auscultation bilaterally  Abdominal: Abdomen soft, nontender, nondistended, positive bowel sounds  Extremities: No lower extremity edema,   Pulses:  Carotid pulses are 2+ on the right side, and 2+ on the left side.  Radial pulses are 2+ on the right side, and 2+ on the left side.   Femoral pulses are 2+ on the right side, and 2+ on the left side.  Popliteal pulses are 2+ on the right side, and 2+ on the left side.   Dorsalis pedis pulses are 2+ on the right side, and 2+ on the left side.   Posterior tibial pulses are 2+ on the right side, and 2+ on the left side.    Skin: No ecchymosis, erythema, or ulcers  Psych: Alert and oriented x 3, appropriate affect  Neuro: CNII-XII intact, no focal deficits    Labs:  Most Recent Data  CBC:   Lab Results   Component Value Date    WBC 8.07 02/29/2024    HGB 13.0 02/29/2024    HCT 40.9 02/29/2024     02/29/2024    MCV 92 02/29/2024    RDW 14.5 02/29/2024     BMP:   Lab Results   Component Value Date     02/29/2024    K 3.9 02/29/2024     02/29/2024    CO2 20 (L) 02/29/2024    BUN 9 02/29/2024    CREATININE 0.8 02/29/2024    GLU 89 02/29/2024    CALCIUM 8.9 02/29/2024     LFTS;   Lab Results   Component Value Date    PROT 7.7 02/29/2024    ALBUMIN 3.5 02/29/2024    BILITOT 0.6 02/29/2024    AST 13 02/29/2024    ALKPHOS 127 02/29/2024    ALT 9 (L) 02/29/2024     COAGS:   Lab Results   Component Value Date    INR 1.0 02/29/2024     FLP:   Lab Results   Component Value Date    CHOL 244 (H) 11/08/2023    HDL 43 11/08/2023    LDLCALC 171 (H) 11/08/2023    TRIG 162 (H) 11/08/2023    CHOLHDL 17.6 (L) 09/19/2023     CARDIAC: No results found for: "TROPONINI", "CKTOTAL", "CKMB", "BNP"    Imaging:    EKG " 4/24/2024:  Normal sinus rhythm with possible left atrial enlargement.     Assessment/Plan:  Geetha Meyers is a 43 y.o. female with HTN, HLD, morbid obesity, RA, fibromyalgia, who presents for an initial appointment.    Abnormal EKG/preoperative cardiac risk stratification prior to hernia surgery/panniculectomy- Pt with chest pain and SOB as described.  Pt with risk factors for CAD, including morbid obesity.  Check PET stress test and echo to complete preoperative cardiac risk stratification. Previous QT prolongation likely due to pregabalin, which pt is no longer taking.     2. HTN- Contniue current medications.    3. HLD- Continue atorvastatin 20 mg daily.    4. Morbid Obesity- Continue management per Bariatric Surgery.    Will call pt with results of stress test and echo  Otherwise, follow up in 5 months    Total duration of face to face visit time 45 minutes.  Total time spent counseling greater than fifty percent of total visit time.  Counseling included discussion regarding imaging findings, diagnosis, possibilities, treatment options, risks and benefits.  The patient had many questions regarding the options and long-term effects.    Hermilo Ortiz MD, PhD  Interventional Cardiology

## 2024-04-25 ENCOUNTER — PATIENT MESSAGE (OUTPATIENT)
Dept: RHEUMATOLOGY | Facility: CLINIC | Age: 44
End: 2024-04-25
Payer: COMMERCIAL

## 2024-04-25 LAB
OHS QRS DURATION: 72 MS
OHS QTC CALCULATION: 399 MS

## 2024-04-25 RX ORDER — METHOCARBAMOL 500 MG/1
TABLET, FILM COATED ORAL
Qty: 120 TABLET | Refills: 3 | Status: SHIPPED | OUTPATIENT
Start: 2024-04-25

## 2024-05-22 DIAGNOSIS — Z12.31 OTHER SCREENING MAMMOGRAM: ICD-10-CM

## 2024-05-29 ENCOUNTER — HOSPITAL ENCOUNTER (OUTPATIENT)
Dept: CARDIOLOGY | Facility: HOSPITAL | Age: 44
Discharge: HOME | End: 2024-05-29
Attending: INTERNAL MEDICINE
Payer: COMMERCIAL

## 2024-05-29 VITALS
BODY MASS INDEX: 51.91 KG/M2 | DIASTOLIC BLOOD PRESSURE: 100 MMHG | WEIGHT: 293 LBS | SYSTOLIC BLOOD PRESSURE: 140 MMHG | HEIGHT: 63 IN | HEART RATE: 82 BPM

## 2024-05-29 DIAGNOSIS — Z01.818 PREOP EXAMINATION: ICD-10-CM

## 2024-05-29 LAB
ASCENDING AORTA: 2.8 CM
AV INDEX (PROSTH): 0.85
AV MEAN GRADIENT: 5 MMHG
AV PEAK GRADIENT: 8 MMHG
AV VELOCITY RATIO: 0.89
BSA FOR ECHO PROCEDURE: 2.45 M2
CV ECHO LV RWT: 0.36 CM
DOP CALC AO PEAK VEL: 1.41 M/S
DOP CALC AO VTI: 28.2 CM
DOP CALC LVOT PEAK VEL: 1.25 M/S
DOP CALC RVOT PEAK VEL: 0.65 M/S
DOP CALC RVOT VTI: 13.65 CM
DOP CALCLVOT PEAK VEL VTI: 23.89 CM
E WAVE DECELERATION TIME: 171.86 MSEC
E/A RATIO: 1.53
E/E' RATIO: 6.82 M/S
ECHO LV POSTERIOR WALL: 0.94 CM (ref 0.6–1.1)
FRACTIONAL SHORTENING: 34 % (ref 28–44)
INTERVENTRICULAR SEPTUM: 0.87 CM (ref 0.6–1.1)
LA MAJOR: 5.59 CM
LA MINOR: 5.61 CM
LA WIDTH: 3.61 CM
LEFT ATRIUM SIZE: 3.39 CM
LEFT ATRIUM VOLUME INDEX MOD: 19.7 ML/M2
LEFT ATRIUM VOLUME INDEX: 25.4 ML/M2
LEFT ATRIUM VOLUME MOD: 45.06 CM3
LEFT ATRIUM VOLUME: 58.25 CM3
LEFT INTERNAL DIMENSION IN SYSTOLE: 3.48 CM (ref 2.1–4)
LEFT VENTRICLE DIASTOLIC VOLUME INDEX: 57.64 ML/M2
LEFT VENTRICLE DIASTOLIC VOLUME: 132 ML
LEFT VENTRICLE MASS INDEX: 75 G/M2
LEFT VENTRICLE SYSTOLIC VOLUME INDEX: 21.9 ML/M2
LEFT VENTRICLE SYSTOLIC VOLUME: 50.24 ML
LEFT VENTRICULAR INTERNAL DIMENSION IN DIASTOLE: 5.24 CM (ref 3.5–6)
LEFT VENTRICULAR MASS: 172.43 G
LV LATERAL E/E' RATIO: 6.82 M/S
LV SEPTAL E/E' RATIO: 6.82 M/S
MV A" WAVE DURATION": 9.32 MSEC
MV PEAK A VEL: 0.49 M/S
MV PEAK E VEL: 0.75 M/S
MV STENOSIS PRESSURE HALF TIME: 49.84 MS
MV VALVE AREA P 1/2 METHOD: 4.41 CM2
OHS CV RV/LV RATIO: 0.52 CM
PISA TR MAX VEL: 2 M/S
PULM VEIN S/D RATIO: 1.06
PV MEAN GRADIENT: 1 MMHG
PV PEAK D VEL: 0.53 M/S
PV PEAK GRADIENT: 4
PV PEAK S VEL: 0.56 M/S
PV PEAK VELOCITY: 0.96 M/S
RA MAJOR: 4.55 CM
RA PRESSURE ESTIMATED: 3 MMHG
RA WIDTH: 2.81 CM
RIGHT VENTRICULAR END-DIASTOLIC DIMENSION: 2.72 CM
RV TB RVSP: 5 MMHG
SINUS: 4.01 CM
STJ: 2.55 CM
TDI LATERAL: 0.11 M/S
TDI SEPTAL: 0.11 M/S
TDI: 0.11 M/S
TR MAX PG: 16 MMHG
TRICUSPID ANNULAR PLANE SYSTOLIC EXCURSION: 2.98 CM
TV REST PULMONARY ARTERY PRESSURE: 19 MMHG
Z-SCORE OF LEFT VENTRICULAR DIMENSION IN END DIASTOLE: -5.13
Z-SCORE OF LEFT VENTRICULAR DIMENSION IN END SYSTOLE: -3.3

## 2024-05-29 PROCEDURE — 93306 TTE W/DOPPLER COMPLETE: CPT | Mod: 26,,, | Performed by: INTERNAL MEDICINE

## 2024-05-29 PROCEDURE — 93306 TTE W/DOPPLER COMPLETE: CPT | Mod: PO

## 2024-06-03 ENCOUNTER — TELEPHONE (OUTPATIENT)
Dept: CARDIOLOGY | Facility: HOSPITAL | Age: 44
End: 2024-06-03
Payer: COMMERCIAL

## 2024-06-05 ENCOUNTER — HOSPITAL ENCOUNTER (OUTPATIENT)
Dept: CARDIOLOGY | Facility: HOSPITAL | Age: 44
Discharge: HOME | End: 2024-06-05
Attending: INTERNAL MEDICINE
Payer: COMMERCIAL

## 2024-06-05 VITALS
DIASTOLIC BLOOD PRESSURE: 62 MMHG | SYSTOLIC BLOOD PRESSURE: 141 MMHG | WEIGHT: 293 LBS | BODY MASS INDEX: 51.91 KG/M2 | RESPIRATION RATE: 16 BRPM | HEART RATE: 99 BPM | HEIGHT: 63 IN

## 2024-06-05 DIAGNOSIS — Z01.818 PREOP EXAMINATION: ICD-10-CM

## 2024-06-05 LAB
CFR FLOW - ANTERIOR: 2.36
CFR FLOW - INFERIOR: 2.12
CFR FLOW - LATERAL: 2.11
CFR FLOW - MAX: 2.74
CFR FLOW - MIN: 1.79
CFR FLOW - SEPTAL: 2.2
CFR FLOW - WHOLE HEART: 2.2
CV STRESS BASE HR: 89 BPM
DIASTOLIC BLOOD PRESSURE: 72 MMHG
EJECTION FRACTION- HIGH: 59 %
END DIASTOLIC INDEX-HIGH: 155 ML/M2
END DIASTOLIC INDEX-LOW: 91 ML/M2
END SYSTOLIC INDEX-HIGH: 78 ML/M2
END SYSTOLIC INDEX-LOW: 40 ML/M2
NUC REST DIASTOLIC VOLUME INDEX: 77
NUC REST EJECTION FRACTION: 75
NUC REST SYSTOLIC VOLUME INDEX: 19
NUC STRESS DIASTOLIC VOLUME INDEX: 84
NUC STRESS EJECTION FRACTION: 78 %
NUC STRESS SYSTOLIC VOLUME INDEX: 19
OHS CV CPX 1 MINUTE RECOVERY HEART RATE: 106 BPM
OHS CV CPX 85 PERCENT MAX PREDICTED HEART RATE MALE: 151
OHS CV CPX MAX PREDICTED HEART RATE: 178
OHS CV CPX PATIENT IS FEMALE: 1
OHS CV CPX PATIENT IS MALE: 0
OHS CV CPX PEAK DIASTOLIC BLOOD PRESSURE: 62 MMHG
OHS CV CPX PEAK HEAR RATE: 100 BPM
OHS CV CPX PEAK RATE PRESSURE PRODUCT: NORMAL
OHS CV CPX PEAK SYSTOLIC BLOOD PRESSURE: 141 MMHG
OHS CV CPX PERCENT MAX PREDICTED HEART RATE ACHIEVED: 59
OHS CV CPX RATE PRESSURE PRODUCT PRESENTING: NORMAL
REST FLOW - ANTERIOR: 0.87 CC/MIN/G
REST FLOW - INFERIOR: 1.07 CC/MIN/G
REST FLOW - LATERAL: 1.15 CC/MIN/G
REST FLOW - MAX: 1.41 CC/MIN/G
REST FLOW - MIN: 0.52 CC/MIN/G
REST FLOW - SEPTAL: 0.87 CC/MIN/G
REST FLOW - WHOLE HEART: 0.99 CC/MIN/G
RETIRED EF AND QEF - SEE NOTES: 47 %
STRESS FLOW - ANTERIOR: 2.03 CC/MIN/G
STRESS FLOW - INFERIOR: 2.27 CC/MIN/G
STRESS FLOW - LATERAL: 2.4 CC/MIN/G
STRESS FLOW - MAX: 2.87 CC/MIN/G
STRESS FLOW - MIN: 1.17 CC/MIN/G
STRESS FLOW - SEPTAL: 1.91 CC/MIN/G
STRESS FLOW - WHOLE HEART: 2.15 CC/MIN/G
SYSTOLIC BLOOD PRESSURE: 168 MMHG

## 2024-06-05 PROCEDURE — 63600175 PHARM REV CODE 636 W HCPCS: Performed by: INTERNAL MEDICINE

## 2024-06-05 PROCEDURE — 93016 CV STRESS TEST SUPVJ ONLY: CPT | Mod: ,,, | Performed by: INTERNAL MEDICINE

## 2024-06-05 PROCEDURE — 78431 MYOCRD IMG PET RST&STRS CT: CPT

## 2024-06-05 PROCEDURE — 78434 AQMBF PET REST & RX STRESS: CPT

## 2024-06-05 PROCEDURE — A9555 RB82 RUBIDIUM: HCPCS | Performed by: INTERNAL MEDICINE

## 2024-06-05 PROCEDURE — 93018 CV STRESS TEST I&R ONLY: CPT | Mod: ,,, | Performed by: INTERNAL MEDICINE

## 2024-06-05 PROCEDURE — 78431 MYOCRD IMG PET RST&STRS CT: CPT | Mod: 26,,, | Performed by: INTERNAL MEDICINE

## 2024-06-05 PROCEDURE — 93017 CV STRESS TEST TRACING ONLY: CPT

## 2024-06-05 PROCEDURE — 78434 AQMBF PET REST & RX STRESS: CPT | Mod: 26,,, | Performed by: INTERNAL MEDICINE

## 2024-06-05 RX ORDER — AMINOPHYLLINE 25 MG/ML
75 INJECTION, SOLUTION INTRAVENOUS
Status: COMPLETED | OUTPATIENT
Start: 2024-06-05 | End: 2024-06-05

## 2024-06-05 RX ORDER — REGADENOSON 0.08 MG/ML
0.4 INJECTION, SOLUTION INTRAVENOUS
Status: COMPLETED | OUTPATIENT
Start: 2024-06-05 | End: 2024-06-05

## 2024-06-05 RX ADMIN — RUBIDIUM CHLORIDE RB-82 41 MILLICURIE: 150 INJECTION, SOLUTION INTRAVENOUS at 07:06

## 2024-06-05 RX ADMIN — REGADENOSON 0.4 MG: 0.08 INJECTION, SOLUTION INTRAVENOUS at 07:06

## 2024-06-05 RX ADMIN — AMINOPHYLLINE 75 MG: 25 INJECTION, SOLUTION INTRAVENOUS at 07:06

## 2024-10-01 ENCOUNTER — PATIENT MESSAGE (OUTPATIENT)
Dept: INTERNAL MEDICINE | Facility: CLINIC | Age: 44
End: 2024-10-01
Payer: COMMERCIAL

## 2024-10-05 ENCOUNTER — OFFICE VISIT (OUTPATIENT)
Dept: URGENT CARE | Facility: CLINIC | Age: 44
End: 2024-10-05
Payer: COMMERCIAL

## 2024-10-05 VITALS
HEART RATE: 82 BPM | TEMPERATURE: 98 F | RESPIRATION RATE: 20 BRPM | HEIGHT: 63 IN | OXYGEN SATURATION: 98 % | SYSTOLIC BLOOD PRESSURE: 117 MMHG | WEIGHT: 293 LBS | BODY MASS INDEX: 51.91 KG/M2 | DIASTOLIC BLOOD PRESSURE: 80 MMHG

## 2024-10-05 DIAGNOSIS — R05.9 COUGH, UNSPECIFIED TYPE: ICD-10-CM

## 2024-10-05 DIAGNOSIS — J01.90 ACUTE BACTERIAL SINUSITIS: Primary | ICD-10-CM

## 2024-10-05 DIAGNOSIS — B96.89 ACUTE BACTERIAL SINUSITIS: Primary | ICD-10-CM

## 2024-10-05 DIAGNOSIS — B37.31 VAGINAL YEAST INFECTION: ICD-10-CM

## 2024-10-05 LAB
BILIRUBIN, UA POC OHS: ABNORMAL
BLOOD, UA POC OHS: NEGATIVE
CLARITY, UA POC OHS: CLEAR
COLOR, UA POC OHS: YELLOW
CTP QC/QA: YES
GLUCOSE, UA POC OHS: NEGATIVE
KETONES, UA POC OHS: NEGATIVE
LEUKOCYTES, UA POC OHS: NEGATIVE
NITRITE, UA POC OHS: NEGATIVE
PH, UA POC OHS: 7.5
PROTEIN, UA POC OHS: ABNORMAL
SARS-COV-2 AG RESP QL IA.RAPID: NEGATIVE
SPECIFIC GRAVITY, UA POC OHS: 1.01
UROBILINOGEN, UA POC OHS: 0.2

## 2024-10-05 PROCEDURE — 0352U VAGINOSIS SCREEN BY DNA PROBE: CPT

## 2024-10-05 PROCEDURE — 87811 SARS-COV-2 COVID19 W/OPTIC: CPT | Mod: QW,S$GLB,,

## 2024-10-05 PROCEDURE — 99214 OFFICE O/P EST MOD 30 MIN: CPT | Mod: S$GLB,,,

## 2024-10-05 PROCEDURE — 81003 URINALYSIS AUTO W/O SCOPE: CPT | Mod: QW,S$GLB,,

## 2024-10-05 RX ORDER — FLUCONAZOLE 150 MG/1
150 TABLET ORAL DAILY
Qty: 1 TABLET | Refills: 0 | Status: SHIPPED | OUTPATIENT
Start: 2024-10-05 | End: 2024-10-06

## 2024-10-05 RX ORDER — AMOXICILLIN AND CLAVULANATE POTASSIUM 875; 125 MG/1; MG/1
1 TABLET, FILM COATED ORAL EVERY 12 HOURS
Qty: 14 TABLET | Refills: 0 | Status: SHIPPED | OUTPATIENT
Start: 2024-10-05 | End: 2024-10-12

## 2024-10-05 NOTE — PATIENT INSTRUCTIONS
Labs have been sent to our outside laboratory. Results should be back in 2-5 days. We will call you once results come back. Check [facility] for results during after hours.       - Rest.    - Drink plenty of fluids. Increasing your fluid intake will help loosen up mucous.    - You have been given an antibiotic to treat your condition today.    - Make sure to take antibiotic with food.   - Please complete the antibiotic as directed on the bottle.   - you can take over-the-counter probiotics during and after antibiotic use to help preserve gut olrin and reduce gastrointestinal symptoms     - You can take over-the-counter claritin, zyrtec, allegra, OR xyzal as directed. These are antihistamines that can help with runny nose, nasal congestion, sneezing, and helps to dry up post-nasal drip, which usually causes sore throat and cough.    - You can take Delsym to help with cough.      - You can use Flonase (fluticasone) nasal spray as directed for sinus congestion and postnasal drip. This is a steroid nasal spray that works locally over time to decrease the inflammation in your nose/sinuses and help with allergic symptoms. This is not an quick- relief spray like afrin, but it works well if used daily.  Discontinue if you develop nose bleed  - Use nasal saline prior to Flonase.  - Use Ocean Spray Nasal Saline 1-3 puffs each nostril every 2-3 hours then blow out onto tissue. This is to irrigate the nasal passage way to clear the sinus openings. Use until sinus problem resolved.    - A Neti Pot with sterile saline can help break up nasal congestion and give relief.      - Chloraseptic throat spray can help numb the throat.     - Warm salt water gargles can help with sore throat.  - Warm tea with honey can help with sore throat and cough. Honey is a natural cough suppressant.      - Acetaminophen (tylenol) or Ibuprofen (advil,motrin) as directed as needed for fever/pain. Avoid tylenol if you have a history of liver  disease. Do not take ibuprofen if you have a history of GI bleeding, kidney disease, or if you take blood thinners.   - Ibuprofen dosing for adults: 400 mg by mouth every 4-6 hours as needed. Max: 2400 mg/day; Info: use lowest effective dose, shortest effective treatment duration; give w/ food if GI upset occurs.  - Tylenol dosing for adults: [By mouth route, immediate-release form] Dose: 325-1000 mg by mouth every 4-6h as needed; Max: 1 g/4h and 4 g/day from all sources. [By mouth route, extended-release form] Dose: 650-1300 mg Extended Release by mouth every 8h as needed; Max: 4 g/day from all sources.     - You must understand that you have received an Urgent Care treatment only and that you may be released before all of your medical problems are known or treated.   - You, the patient, will arrange for follow up care as instructed.   - If your condition worsens or fails to improve we recommend that you receive another evaluation at the ER immediately or contact your PCP to discuss your concerns or return here.   - Follow up with your PCP or specialty clinic as directed in the next 1-2 weeks if not improved or as needed.  You can call [phone] to schedule an appointment with the appropriate provider.    If your symptoms do not improve or worsen, go to the emergency room immediately.

## 2024-10-05 NOTE — PROGRESS NOTES
"Subjective:      Patient ID: Geetha Meyers is a 44 y.o. female.    Vitals:  height is 5' 3" (1.6 m) and weight is 134.7 kg (297 lb). Her oral temperature is 97.9 °F (36.6 °C). Her blood pressure is 117/80 and her pulse is 82. Her respiration is 20 and oxygen saturation is 98%.     Chief Complaint: Cough    Pt states that she is coming in for cough congestion body aches and a possible UTI. Pt syms started a month ago. Pt pain level is a 8. Pt self treated with OTC decongestant and nasal spray.  She is having vaginal discharge, unsure if this is her new normal. Recently had a hysterectomy.     UTI symptoms 1 month ago.   URI symptoms started 1 month go. Coming and going.     Cough  This is a new problem. The current episode started more than 1 month ago. The problem has been unchanged. The problem occurs constantly. The cough is Non-productive. Associated symptoms include ear pain, headaches, myalgias, nasal congestion and postnasal drip. Pertinent negatives include no fever. She has tried OTC cough suppressant for the symptoms. The treatment provided no relief.       Constitution: Positive for fatigue. Negative for fever.   HENT:  Positive for ear pain, congestion, postnasal drip and sinus pressure.    Respiratory:  Positive for cough. Negative for sputum production.    Genitourinary:  Positive for frequency, urgency, flank pain and vaginal discharge. Negative for dysuria and hematuria.   Musculoskeletal:  Positive for muscle ache.   Neurological:  Positive for headaches.      Objective:     Physical Exam   Constitutional: She is oriented to person, place, and time. She appears well-developed. She is cooperative.  Non-toxic appearance. She does not appear ill. No distress.      Comments:Patient sits comfortably in exam chair. Answers questions in complete sentences. Does not show any signs of distress or discoloration.        HENT:   Head: Normocephalic and atraumatic.   Ears:   Right Ear: Hearing, external ear and " ear canal normal. A middle ear effusion is present. no impacted cerumen  Left Ear: Hearing, tympanic membrane, external ear and ear canal normal. no impacted cerumen  Nose: Rhinorrhea, sinus tenderness and congestion present. No mucosal edema or nasal deformity. No epistaxis. Right sinus exhibits maxillary sinus tenderness. Right sinus exhibits no frontal sinus tenderness. Left sinus exhibits maxillary sinus tenderness. Left sinus exhibits no frontal sinus tenderness.   Mouth/Throat: Uvula is midline, oropharynx is clear and moist and mucous membranes are normal. No trismus in the jaw. Normal dentition. No uvula swelling. No oropharyngeal exudate, posterior oropharyngeal edema or posterior oropharyngeal erythema. No tonsillar exudate.   Eyes: Conjunctivae and lids are normal. No scleral icterus.   Neck: Trachea normal and phonation normal. Neck supple. No edema present. No erythema present. No neck rigidity present.   Cardiovascular: Normal rate, regular rhythm, normal heart sounds and normal pulses.   Pulmonary/Chest: Effort normal and breath sounds normal. No stridor. No respiratory distress. She has no decreased breath sounds. She has no wheezes. She has no rhonchi. She has no rales.   Abdominal: Normal appearance.   Musculoskeletal: Normal range of motion.         General: No deformity. Normal range of motion.   Neurological: She is alert and oriented to person, place, and time. She exhibits normal muscle tone. Coordination normal.   Skin: Skin is warm, dry, intact, not diaphoretic and not pale.   Psychiatric: Her speech is normal and behavior is normal. Judgment and thought content normal.   Nursing note and vitals reviewed.    Results for orders placed or performed in visit on 10/05/24   SARS Coronavirus 2 Antigen, POCT Manual Read    Collection Time: 10/05/24 10:58 AM   Result Value Ref Range    SARS Coronavirus 2 Antigen Negative Negative     Acceptable Yes    POCT Urinalysis(Instrument)     Collection Time: 10/05/24 11:04 AM   Result Value Ref Range    Color, POC UA Yellow Yellow, Straw, Colorless    Clarity, POC UA Clear Clear    Glucose, POC UA Negative Negative    Bilirubin, POC UA Small (A) Negative    Ketones, POC UA Negative Negative    Spec Grav POC UA 1.015 1.005 - 1.030    Blood, POC UA Negative Negative    pH, POC UA 7.5 5.0 - 8.0    Protein, POC UA Trace (A) Negative    Urobilinogen, POC UA 0.2 <=1.0    Nitrite, POC UA Negative Negative    WBC, POC UA Negative Negative       Assessment:     1. Acute bacterial sinusitis    2. Cough, unspecified type    3. Vaginal yeast infection        Plan:       Acute bacterial sinusitis  -     amoxicillin-clavulanate 875-125mg (AUGMENTIN) 875-125 mg per tablet; Take 1 tablet by mouth every 12 (twelve) hours. for 7 days  Dispense: 14 tablet; Refill: 0    Cough, unspecified type  -     SARS Coronavirus 2 Antigen, POCT Manual Read  -     POCT Urinalysis(Instrument)    Vaginal yeast infection  -     Vaginosis Screen by DNA Probe  -     fluconazole (DIFLUCAN) 150 MG Tab; Take 1 tablet (150 mg total) by mouth once daily. for 1 day  Dispense: 1 tablet; Refill: 0                Patient Instructions   Labs have been sent to our outside laboratory. Results should be back in 2-5 days. We will call you once results come back. Check [facility] for results during after hours.       - Rest.    - Drink plenty of fluids. Increasing your fluid intake will help loosen up mucous.    - You have been given an antibiotic to treat your condition today.    - Make sure to take antibiotic with food.   - Please complete the antibiotic as directed on the bottle.   - you can take over-the-counter probiotics during and after antibiotic use to help preserve gut lorin and reduce gastrointestinal symptoms     - You can take over-the-counter claritin, zyrtec, allegra, OR xyzal as directed. These are antihistamines that can help with runny nose, nasal congestion, sneezing, and  helps to dry up post-nasal drip, which usually causes sore throat and cough.    - You can take Delsym to help with cough.      - You can use Flonase (fluticasone) nasal spray as directed for sinus congestion and postnasal drip. This is a steroid nasal spray that works locally over time to decrease the inflammation in your nose/sinuses and help with allergic symptoms. This is not an quick- relief spray like afrin, but it works well if used daily.  Discontinue if you develop nose bleed  - Use nasal saline prior to Flonase.  - Use Ocean Spray Nasal Saline 1-3 puffs each nostril every 2-3 hours then blow out onto tissue. This is to irrigate the nasal passage way to clear the sinus openings. Use until sinus problem resolved.    - A Neti Pot with sterile saline can help break up nasal congestion and give relief.      - Chloraseptic throat spray can help numb the throat.     - Warm salt water gargles can help with sore throat.  - Warm tea with honey can help with sore throat and cough. Honey is a natural cough suppressant.      - Acetaminophen (tylenol) or Ibuprofen (advil,motrin) as directed as needed for fever/pain. Avoid tylenol if you have a history of liver disease. Do not take ibuprofen if you have a history of GI bleeding, kidney disease, or if you take blood thinners.   - Ibuprofen dosing for adults: 400 mg by mouth every 4-6 hours as needed. Max: 2400 mg/day; Info: use lowest effective dose, shortest effective treatment duration; give w/ food if GI upset occurs.  - Tylenol dosing for adults: [By mouth route, immediate-release form] Dose: 325-1000 mg by mouth every 4-6h as needed; Max: 1 g/4h and 4 g/day from all sources. [By mouth route, extended-release form] Dose: 650-1300 mg Extended Release by mouth every 8h as needed; Max: 4 g/day from all sources.     - You must understand that you have received an Urgent Care treatment only and that you may be released before all of your medical problems are known or  treated.   - You, the patient, will arrange for follow up care as instructed.   - If your condition worsens or fails to improve we recommend that you receive another evaluation at the ER immediately or contact your PCP to discuss your concerns or return here.   - Follow up with your PCP or specialty clinic as directed in the next 1-2 weeks if not improved or as needed.  You can call [phone] to schedule an appointment with the appropriate provider.    If your symptoms do not improve or worsen, go to the emergency room immediately.

## 2024-10-09 ENCOUNTER — LAB VISIT (OUTPATIENT)
Dept: LAB | Facility: HOSPITAL | Age: 44
End: 2024-10-09
Payer: COMMERCIAL

## 2024-10-09 ENCOUNTER — OFFICE VISIT (OUTPATIENT)
Dept: INTERNAL MEDICINE | Facility: CLINIC | Age: 44
End: 2024-10-09
Payer: COMMERCIAL

## 2024-10-09 VITALS
OXYGEN SATURATION: 98 % | BODY MASS INDEX: 47.93 KG/M2 | SYSTOLIC BLOOD PRESSURE: 124 MMHG | HEIGHT: 63 IN | DIASTOLIC BLOOD PRESSURE: 82 MMHG | WEIGHT: 270.5 LBS | HEART RATE: 95 BPM | TEMPERATURE: 98 F | RESPIRATION RATE: 18 BRPM

## 2024-10-09 DIAGNOSIS — F33.1 MODERATE EPISODE OF RECURRENT MAJOR DEPRESSIVE DISORDER: ICD-10-CM

## 2024-10-09 DIAGNOSIS — I10 PRIMARY HYPERTENSION: ICD-10-CM

## 2024-10-09 DIAGNOSIS — E66.01 MORBID OBESITY WITH BMI OF 45.0-49.9, ADULT: ICD-10-CM

## 2024-10-09 DIAGNOSIS — E55.9 VITAMIN D DEFICIENCY: ICD-10-CM

## 2024-10-09 DIAGNOSIS — M06.9 RHEUMATOID ARTHRITIS, INVOLVING UNSPECIFIED SITE, UNSPECIFIED WHETHER RHEUMATOID FACTOR PRESENT: ICD-10-CM

## 2024-10-09 DIAGNOSIS — R53.83 FATIGUE, UNSPECIFIED TYPE: ICD-10-CM

## 2024-10-09 DIAGNOSIS — R53.83 FATIGUE, UNSPECIFIED TYPE: Primary | ICD-10-CM

## 2024-10-09 DIAGNOSIS — M79.7 FIBROMYALGIA: ICD-10-CM

## 2024-10-09 DIAGNOSIS — D84.9 IMMUNOSUPPRESSED STATUS: ICD-10-CM

## 2024-10-09 LAB
25(OH)D3+25(OH)D2 SERPL-MCNC: 14 NG/ML (ref 30–96)
ALBUMIN SERPL BCP-MCNC: 3.5 G/DL (ref 3.5–5.2)
ALP SERPL-CCNC: 106 U/L (ref 55–135)
ALT SERPL W/O P-5'-P-CCNC: 7 U/L (ref 10–44)
ANION GAP SERPL CALC-SCNC: 7 MMOL/L (ref 8–16)
AST SERPL-CCNC: 11 U/L (ref 10–40)
BASOPHILS # BLD AUTO: 0.03 K/UL (ref 0–0.2)
BASOPHILS NFR BLD: 0.4 % (ref 0–1.9)
BILIRUB SERPL-MCNC: 0.4 MG/DL (ref 0.1–1)
BUN SERPL-MCNC: 12 MG/DL (ref 6–20)
CALCIUM SERPL-MCNC: 9.5 MG/DL (ref 8.7–10.5)
CHLORIDE SERPL-SCNC: 105 MMOL/L (ref 95–110)
CO2 SERPL-SCNC: 27 MMOL/L (ref 23–29)
CREAT SERPL-MCNC: 0.8 MG/DL (ref 0.5–1.4)
DIFFERENTIAL METHOD BLD: ABNORMAL
EOSINOPHIL # BLD AUTO: 0 K/UL (ref 0–0.5)
EOSINOPHIL NFR BLD: 0.6 % (ref 0–8)
ERYTHROCYTE [DISTWIDTH] IN BLOOD BY AUTOMATED COUNT: 14.6 % (ref 11.5–14.5)
EST. GFR  (NO RACE VARIABLE): >60 ML/MIN/1.73 M^2
GLUCOSE SERPL-MCNC: 92 MG/DL (ref 70–110)
HCT VFR BLD AUTO: 40.5 % (ref 37–48.5)
HGB BLD-MCNC: 13.1 G/DL (ref 12–16)
IMM GRANULOCYTES # BLD AUTO: 0.02 K/UL (ref 0–0.04)
IMM GRANULOCYTES NFR BLD AUTO: 0.3 % (ref 0–0.5)
IRON SERPL-MCNC: 65 UG/DL (ref 30–160)
LYMPHOCYTES # BLD AUTO: 2.7 K/UL (ref 1–4.8)
LYMPHOCYTES NFR BLD: 38.9 % (ref 18–48)
MCH RBC QN AUTO: 30.6 PG (ref 27–31)
MCHC RBC AUTO-ENTMCNC: 32.3 G/DL (ref 32–36)
MCV RBC AUTO: 95 FL (ref 82–98)
MONOCYTES # BLD AUTO: 0.5 K/UL (ref 0.3–1)
MONOCYTES NFR BLD: 7.8 % (ref 4–15)
NEUTROPHILS # BLD AUTO: 3.6 K/UL (ref 1.8–7.7)
NEUTROPHILS NFR BLD: 52 % (ref 38–73)
NRBC BLD-RTO: 0 /100 WBC
PLATELET # BLD AUTO: 366 K/UL (ref 150–450)
PMV BLD AUTO: 11 FL (ref 9.2–12.9)
POTASSIUM SERPL-SCNC: 4.6 MMOL/L (ref 3.5–5.1)
PROT SERPL-MCNC: 7.6 G/DL (ref 6–8.4)
RBC # BLD AUTO: 4.28 M/UL (ref 4–5.4)
SATURATED IRON: 18 % (ref 20–50)
SODIUM SERPL-SCNC: 139 MMOL/L (ref 136–145)
TOTAL IRON BINDING CAPACITY: 367 UG/DL (ref 250–450)
TRANSFERRIN SERPL-MCNC: 248 MG/DL (ref 200–375)
TSH SERPL DL<=0.005 MIU/L-ACNC: 0.95 UIU/ML (ref 0.4–4)
VIT B12 SERPL-MCNC: 342 PG/ML (ref 210–950)
WBC # BLD AUTO: 6.83 K/UL (ref 3.9–12.7)

## 2024-10-09 PROCEDURE — 82306 VITAMIN D 25 HYDROXY: CPT | Performed by: NURSE PRACTITIONER

## 2024-10-09 PROCEDURE — 82607 VITAMIN B-12: CPT | Performed by: NURSE PRACTITIONER

## 2024-10-09 PROCEDURE — 3074F SYST BP LT 130 MM HG: CPT | Mod: CPTII,S$GLB,, | Performed by: NURSE PRACTITIONER

## 2024-10-09 PROCEDURE — 3079F DIAST BP 80-89 MM HG: CPT | Mod: CPTII,S$GLB,, | Performed by: NURSE PRACTITIONER

## 2024-10-09 PROCEDURE — 84425 ASSAY OF VITAMIN B-1: CPT | Performed by: NURSE PRACTITIONER

## 2024-10-09 PROCEDURE — 99999 PR PBB SHADOW E&M-EST. PATIENT-LVL V: CPT | Mod: PBBFAC,,, | Performed by: NURSE PRACTITIONER

## 2024-10-09 PROCEDURE — 83540 ASSAY OF IRON: CPT | Performed by: NURSE PRACTITIONER

## 2024-10-09 PROCEDURE — 1160F RVW MEDS BY RX/DR IN RCRD: CPT | Mod: CPTII,S$GLB,, | Performed by: NURSE PRACTITIONER

## 2024-10-09 PROCEDURE — 84443 ASSAY THYROID STIM HORMONE: CPT | Performed by: NURSE PRACTITIONER

## 2024-10-09 PROCEDURE — 99214 OFFICE O/P EST MOD 30 MIN: CPT | Mod: S$GLB,,, | Performed by: NURSE PRACTITIONER

## 2024-10-09 PROCEDURE — 3008F BODY MASS INDEX DOCD: CPT | Mod: CPTII,S$GLB,, | Performed by: NURSE PRACTITIONER

## 2024-10-09 PROCEDURE — 85025 COMPLETE CBC W/AUTO DIFF WBC: CPT | Performed by: NURSE PRACTITIONER

## 2024-10-09 PROCEDURE — 1159F MED LIST DOCD IN RCRD: CPT | Mod: CPTII,S$GLB,, | Performed by: NURSE PRACTITIONER

## 2024-10-09 PROCEDURE — 80053 COMPREHEN METABOLIC PANEL: CPT | Performed by: NURSE PRACTITIONER

## 2024-10-09 NOTE — PROGRESS NOTES
Subjective:       Patient ID: Geetha Meyers is a 44 y.o. female.    Chief Complaint: Follow-up (Fatigue)    History of Present Illness    CHIEF COMPLAINT:  Ms. Meyers presents today for fatigue and general illness.    FATIGUE AND GENERAL ILLNESS:  She reports experiencing fatigue and general illness for several months. Symptoms initially improved but have since worsened. The fatigue is described as ongoing and persistent. She feels depressed, primarily attributing this to fatigue and reduced productivity. She expresses not feeling like herself and being unable to accomplish as much as usual.    SLEEP:  She has difficulty falling asleep. Occasionally, she sleeps deeply through the night when very tired. However, joint flare-ups cause tossing and turning due to discomfort. She has been diagnosed with sleep apnea but does not currently use a CPAP machine.     MEDICAL HISTORY:  She has a history of seronegative rheumatoid arthritis, fibromyalgia, sleep apnea, and low vitamin levels. She underwent a hysterectomy and attributes some of current symptoms to potential post-procedure hormonal changes.    RECENT HEALTH CONCERNS:  A recent urgent care visit for suspected bladder infection was negative for bladder infection, yeast infection, and bacterial vaginosis. Urinalysis results showed protein and bilirubin in her urine. She was prescribed antibiotics at that time for a sinus infection.    MEDICATIONS AND ALLERGIES:  She is currently taking antibiotics for a sinus infection and Xyzal for allergies. She has a history of poor reactions to various antidepressants, including Cymbalta, which were prescribed for both depression and fibromyalgia. She expresses concern about taking medications that may increase drowsiness.    WEIGHT AND BLOOD PRESSURE:  She reports recent weight loss of approximately 20 lbs, from 290 to 270 lbs. Her blood pressure, previously uncontrolled, has improved.    SOCIAL HISTORY:  She recently experienced  a three-month lapse in health insurance coverage due to issues with her business-provided insurance, resulting in delayed medical appointments. She is now resuming regular medical care.    Review of patient's allergies indicates:   Allergen Reactions    Amitriptyline hcl Diarrhea, Hives, Itching, Nausea Only, Rash, Shortness Of Breath and Swelling    Bactrim [sulfamethoxazole-trimethoprim]      Aseptic meningitis    Hydrobenzthiazide Diarrhea, Nausea Only, Palpitations, Photosensitivity, Rash and Swelling    Mold Diarrhea, Hives, Itching, Nausea And Vomiting, Rash, Shortness Of Breath and Swelling    Mushroom Anaphylaxis, Diarrhea, Edema, Hives, Itching, Nausea And Vomiting, Rash and Swelling    Sulfasalazine      Orange skin,nausea,diarrhea,h/a,ringing in the ears    Sulfa (sulfonamide antibiotics)     House dust Hives, Itching and Rash    Shea butter Hives, Itching and Rash    Wool Hives, Itching, Nausea Only, Rash and Swelling       Medication List with Changes/Refills   Current Medications    ACETAMINOPHEN (TYLENOL) 500 MG TABLET    Take 1 tablet (500 mg total) by mouth every 6 (six) hours as needed.    ALBUTEROL (VENTOLIN HFA) 90 MCG/ACTUATION INHALER    Inhale 2 puffs into the lungs every 6 (six) hours as needed for Wheezing. Rescue    AMLODIPINE (NORVASC) 5 MG TABLET    Take 5 mg by mouth once daily.    AMOXICILLIN-CLAVULANATE 875-125MG (AUGMENTIN) 875-125 MG PER TABLET    Take 1 tablet by mouth every 12 (twelve) hours. for 7 days    ATORVASTATIN (LIPITOR) 20 MG TABLET    Take 1 tablet by mouth once daily.    BENZONATATE (TESSALON PERLES) 100 MG CAPSULE    Take 1 capsule (100 mg total) by mouth every 6 (six) hours as needed for Cough.    CETIRIZINE (ZYRTEC) 10 MG TABLET    Take 10 mg by mouth once daily.    ERGOCALCIFEROL, VITAMIN D2, (VITAMIN D2 ORAL)    Take 1 tablet by mouth once daily.    ETODOLAC (LODINE) 400 MG TABLET    Take 1 tablet (400 mg total) by mouth 2 (two) times daily.    FLUTICASONE  "PROPIONATE (FLONASE) 50 MCG/ACTUATION NASAL SPRAY    2 sprays (100 mcg total) by Each Nostril route 2 (two) times daily as needed for Rhinitis.    GUAIFENESIN-CODEINE 100-10 MG/5 ML (TUSSI-ORGANIDIN NR)  MG/5 ML SYRUP    Take 5 mLs by mouth every 6 (six) hours as needed for Cough or Congestion. May cause sedation    LEVOCETIRIZINE (XYZAL) 5 MG TABLET    Take 5 mg by mouth daily as needed for Allergies.    LIDOCAINE (LIDODERM) 5 %    Place 1 patch onto the skin once daily. Remove & Discard patch within 12 hours or as directed by MD    METHOCARBAMOL (ROBAXIN) 500 MG TAB    Take 1 or 2 tablets up to 3 times daily as needed for muscle spasm    ONDANSETRON (ZOFRAN) 4 MG TABLET    Take 4 mg by mouth every 4 (four) hours as needed.    THIAMINE MONONITRATE, VIT B1, (VITAMIN B-1, MONONITRATE,) 100 MG TAB    1 tablet Orally Once a day for 30 day(s)       Review of Systems   Constitutional:  Positive for activity change and fatigue. Negative for unexpected weight change.   HENT:  Positive for rhinorrhea and trouble swallowing. Negative for hearing loss.    Eyes:  Positive for discharge and visual disturbance.   Respiratory:  Positive for chest tightness. Negative for wheezing.    Cardiovascular:  Positive for chest pain and palpitations.   Gastrointestinal:  Negative for blood in stool, constipation, diarrhea and vomiting.   Endocrine: Positive for polyuria. Negative for polydipsia.   Genitourinary:  Positive for difficulty urinating. Negative for dysuria, hematuria and menstrual problem.   Musculoskeletal:  Positive for arthralgias, joint swelling and neck pain.   Neurological:  Positive for weakness and headaches.   Psychiatric/Behavioral:  Positive for dysphoric mood and sleep disturbance. Negative for confusion.       Objective:   /82 (BP Location: Left arm, Patient Position: Sitting)   Pulse 95   Temp 97.6 °F (36.4 °C) (Temporal)   Resp 18   Ht 5' 3" (1.6 m)   Wt 122.7 kg (270 lb 8.1 oz)   SpO2 98%   " BMI 47.92 kg/m²     Physical Exam  Vitals reviewed.   Constitutional:       Appearance: Normal appearance. She is obese.   HENT:      Head: Normocephalic and atraumatic.      Right Ear: Tympanic membrane is injected and bulging.      Left Ear: Tympanic membrane is bulging.   Cardiovascular:      Rate and Rhythm: Normal rate and regular rhythm.      Heart sounds: Normal heart sounds. No murmur heard.  Pulmonary:      Effort: Pulmonary effort is normal.      Breath sounds: Normal breath sounds. No wheezing.   Skin:     General: Skin is warm and dry.   Neurological:      Mental Status: She is alert and oriented to person, place, and time.       I reviewed and independently interpreted the labs and imaging below:  Last Labs:  Glucose   Date Value Ref Range Status   02/29/2024 89 70 - 110 mg/dL Final   03/14/2023 104 70 - 110 mg/dL Final     BUN   Date Value Ref Range Status   02/29/2024 9 6 - 20 mg/dL Final   03/14/2023 8 6 - 20 mg/dL Final     Creatinine   Date Value Ref Range Status   02/29/2024 0.8 0.5 - 1.4 mg/dL Final   03/14/2023 0.9 0.5 - 1.4 mg/dL Final     Cholesterol   Date Value Ref Range Status   11/08/2023 244 (H) 100 - 199 mg/dL Final   09/19/2023 272 (H) 120 - 199 mg/dL Final     Comment:     The National Cholesterol Education Program (NCEP) has set the  following guidelines (reference ranges) for Cholesterol:  Optimal.....................<200 mg/dL  Borderline High.............200-239 mg/dL  High........................> or = 240 mg/dL     03/14/2023 162 120 - 199 mg/dL Final     Comment:     The National Cholesterol Education Program (NCEP) has set the  following guidelines (reference ranges) for Cholesterol:  Optimal.....................<200 mg/dL  Borderline High.............200-239 mg/dL  High........................> or = 240 mg/dL       Hemoglobin A1C   Date Value Ref Range Status   11/08/2023 6.0 (H) 4.8 - 5.6 % Final     Comment:              Prediabetes: 5.7 - 6.4           Diabetes: >6.4            Glycemic control for adults with diabetes: <7.0   09/19/2023 5.6 4.0 - 5.6 % Final     Comment:     ADA Screening Guidelines:  5.7-6.4%  Consistent with prediabetes  >or=6.5%  Consistent with diabetes    High levels of fetal hemoglobin interfere with the HbA1C  assay. Heterozygous hemoglobin variants (HbS, HgC, etc)do  not significantly interfere with this assay.   However, presence of multiple variants may affect accuracy.     03/14/2023 5.4 4.0 - 5.6 % Final     Comment:     ADA Screening Guidelines:  5.7-6.4%  Consistent with prediabetes  >or=6.5%  Consistent with diabetes    High levels of fetal hemoglobin interfere with the HbA1C  assay. Heterozygous hemoglobin variants (HbS, HgC, etc)do  not significantly interfere with this assay.   However, presence of multiple variants may affect accuracy.       Hemoglobin   Date Value Ref Range Status   02/29/2024 13.0 12.0 - 16.0 g/dL Final   11/08/2023 13.4 11.1 - 15.9 g/dL Final   03/14/2023 12.5 12.0 - 16.0 g/dL Final     POC Hematocrit   Date Value Ref Range Status   03/17/2021 36 36 - 54 %PCV Final     Hematocrit   Date Value Ref Range Status   02/29/2024 40.9 37.0 - 48.5 % Final   11/08/2023 41.0 34.0 - 46.6 % Final   03/14/2023 41.3 37.0 - 48.5 % Final       Assessment and Plan:     1. Fatigue, unspecified type (Primary)    Considered multiple factors contributing to patient's fatigue, including autoimmune disorders, hormonal changes post-hysterectomy, and current sinus infection  Planned to check vitamin levels, thyroid function, and screen for anemia to identify potential causes of fatigue  Recognized untreated sleep apnea as a possible contributor to fatigue    - CBC Auto Differential; Future  - Comprehensive Metabolic Panel; Future  - Iron and TIBC; Future  - TSH; Future  - Vitamin B12; Future  - VITAMIN B1; Future  - Vitamin D; Future  - Ambulatory referral/consult to Sleep Disorders; Future    2. Moderate episode of recurrent major depressive  disorder    Chronic, stable, not currently interested in medication     3. Vitamin D deficiency    Chronic, labs to assess stability     - Vitamin D; Future    4. Primary hypertension    Chronic, stable    - CBC Auto Differential; Future  - Comprehensive Metabolic Panel; Future  - TSH; Future    5. Rheumatoid arthritis, involving unspecified site, unspecified whether rheumatoid factor present  6. Immunosuppressed status  7. Fibromyalgia    Chronic, stable    8. Morbid obesity with BMI of 45.0-49.9, adult    Chronic, stable      SINUSITIS:  Consider adding Flonase to help drain fluid from sinuses.  Continued Xyzal for allergies.  Continued antibiotics for sinus infection, with 4 days remaining in course.    SLEEP DISORDER:  - Referred to sleep medicine for evaluation and potential CPAP management.          This note was generated with the assistance of ambient listening technology. Verbal consent was obtained by the patient and accompanying visitor(s) for the recording of patient appointment to facilitate this note. I attest to having reviewed and edited the generated note for accuracy, though some syntax or spelling errors may persist. Please contact the author of this note for any clarification.

## 2024-10-10 ENCOUNTER — TELEPHONE (OUTPATIENT)
Dept: INTERNAL MEDICINE | Facility: CLINIC | Age: 44
End: 2024-10-10
Payer: COMMERCIAL

## 2024-10-10 DIAGNOSIS — E55.9 VITAMIN D INSUFFICIENCY: Primary | ICD-10-CM

## 2024-10-10 LAB
BACTERIAL VAGINOSIS DNA: DETECTED
CANDIDA GLABRATA/KRUSEI: NOT DETECTED
CANDIDA RRNA VAG QL PROBE: NOT DETECTED
TRICHOMONAS VAGINALIS: NOT DETECTED

## 2024-10-10 RX ORDER — ERGOCALCIFEROL 1.25 MG/1
50000 CAPSULE ORAL
Qty: 8 CAPSULE | Refills: 0 | Status: SHIPPED | OUTPATIENT
Start: 2024-10-10

## 2024-10-10 NOTE — TELEPHONE ENCOUNTER
Low ALT and anion gap are not worrisome. ALT is a liver enzyme and only worrisome if elevated. Anion gap we use to look at acid base imbalances in the body but because she has no other abnormal values it is not a problem.   RDW was minimally elevated which tells us her red blood cells are just a bit large - again nothing alarming.

## 2024-10-10 NOTE — TELEPHONE ENCOUNTER
The patient still have questions regarding her lab results.  Wanting to know about the other low values and 1 high value.    Please advise

## 2024-10-17 ENCOUNTER — TELEPHONE (OUTPATIENT)
Dept: URGENT CARE | Facility: CLINIC | Age: 44
End: 2024-10-17
Payer: COMMERCIAL

## 2024-10-17 DIAGNOSIS — N76.0 BV (BACTERIAL VAGINOSIS): Primary | ICD-10-CM

## 2024-10-17 DIAGNOSIS — B96.89 BV (BACTERIAL VAGINOSIS): Primary | ICD-10-CM

## 2024-10-17 RX ORDER — METRONIDAZOLE 500 MG/1
500 TABLET ORAL EVERY 12 HOURS
Qty: 14 TABLET | Refills: 0 | Status: SHIPPED | OUTPATIENT
Start: 2024-10-17 | End: 2024-10-24

## 2024-10-17 NOTE — TELEPHONE ENCOUNTER
Pt noticed that result in epic was corrected for BV from not detected, to detected. Will treat for BV at this time. Rx sent to pharmacy.

## 2025-01-09 ENCOUNTER — TELEPHONE (OUTPATIENT)
Dept: SLEEP MEDICINE | Facility: CLINIC | Age: 45
End: 2025-01-09
Payer: COMMERCIAL

## 2025-01-09 NOTE — TELEPHONE ENCOUNTER
Staff contact pt to  reschedule upcoming appt with provider lindsey guzman , not sure how pt was schedule we are out of clinic on Friday's

## 2025-06-13 ENCOUNTER — OFFICE VISIT (OUTPATIENT)
Dept: INTERNAL MEDICINE | Facility: CLINIC | Age: 45
End: 2025-06-13
Payer: COMMERCIAL

## 2025-06-13 ENCOUNTER — LAB VISIT (OUTPATIENT)
Dept: LAB | Facility: HOSPITAL | Age: 45
End: 2025-06-13
Attending: HOSPITALIST
Payer: COMMERCIAL

## 2025-06-13 VITALS
BODY MASS INDEX: 46.8 KG/M2 | TEMPERATURE: 98 F | WEIGHT: 264.13 LBS | OXYGEN SATURATION: 97 % | SYSTOLIC BLOOD PRESSURE: 142 MMHG | HEART RATE: 93 BPM | RESPIRATION RATE: 17 BRPM | HEIGHT: 63 IN | DIASTOLIC BLOOD PRESSURE: 98 MMHG

## 2025-06-13 DIAGNOSIS — M79.7 FIBROMYALGIA: ICD-10-CM

## 2025-06-13 DIAGNOSIS — E66.01 MORBID OBESITY WITH BMI OF 45.0-49.9, ADULT: ICD-10-CM

## 2025-06-13 DIAGNOSIS — E78.5 HYPERLIPIDEMIA, UNSPECIFIED HYPERLIPIDEMIA TYPE: ICD-10-CM

## 2025-06-13 DIAGNOSIS — J30.9 ALLERGIC SINUSITIS: ICD-10-CM

## 2025-06-13 DIAGNOSIS — M25.50 ARTHRALGIA, UNSPECIFIED JOINT: ICD-10-CM

## 2025-06-13 DIAGNOSIS — Z12.31 ENCOUNTER FOR SCREENING MAMMOGRAM FOR BREAST CANCER: ICD-10-CM

## 2025-06-13 DIAGNOSIS — Z00.00 ANNUAL PHYSICAL EXAM: ICD-10-CM

## 2025-06-13 DIAGNOSIS — Z00.00 ANNUAL PHYSICAL EXAM: Primary | ICD-10-CM

## 2025-06-13 DIAGNOSIS — R73.03 PREDIABETES: ICD-10-CM

## 2025-06-13 DIAGNOSIS — I10 PRIMARY HYPERTENSION: ICD-10-CM

## 2025-06-13 LAB
ABSOLUTE EOSINOPHIL (OHS): 0.03 K/UL
ABSOLUTE MONOCYTE (OHS): 0.47 K/UL (ref 0.3–1)
ABSOLUTE NEUTROPHIL COUNT (OHS): 2.99 K/UL (ref 1.8–7.7)
ALBUMIN SERPL BCP-MCNC: 3.8 G/DL (ref 3.5–5.2)
ALP SERPL-CCNC: 120 UNIT/L (ref 40–150)
ALT SERPL W/O P-5'-P-CCNC: 10 UNIT/L (ref 10–44)
ANION GAP (OHS): 9 MMOL/L (ref 8–16)
AST SERPL-CCNC: 15 UNIT/L (ref 11–45)
BASOPHILS # BLD AUTO: 0.03 K/UL
BASOPHILS NFR BLD AUTO: 0.5 %
BILIRUB SERPL-MCNC: 0.5 MG/DL (ref 0.1–1)
BUN SERPL-MCNC: 10 MG/DL (ref 6–20)
CALCIUM SERPL-MCNC: 8.9 MG/DL (ref 8.7–10.5)
CHLORIDE SERPL-SCNC: 107 MMOL/L (ref 95–110)
CHOLEST SERPL-MCNC: 275 MG/DL (ref 120–199)
CHOLEST/HDLC SERPL: 5.3 {RATIO} (ref 2–5)
CO2 SERPL-SCNC: 26 MMOL/L (ref 23–29)
CREAT SERPL-MCNC: 0.8 MG/DL (ref 0.5–1.4)
EAG (OHS): 105 MG/DL (ref 68–131)
ERYTHROCYTE [DISTWIDTH] IN BLOOD BY AUTOMATED COUNT: 14.3 % (ref 11.5–14.5)
GFR SERPLBLD CREATININE-BSD FMLA CKD-EPI: >60 ML/MIN/1.73/M2
GLUCOSE SERPL-MCNC: 94 MG/DL (ref 70–110)
HBA1C MFR BLD: 5.3 % (ref 4–5.6)
HCT VFR BLD AUTO: 38.9 % (ref 37–48.5)
HDLC SERPL-MCNC: 52 MG/DL (ref 40–75)
HDLC SERPL: 18.9 % (ref 20–50)
HGB BLD-MCNC: 12.2 GM/DL (ref 12–16)
IMM GRANULOCYTES # BLD AUTO: 0.01 K/UL (ref 0–0.04)
IMM GRANULOCYTES NFR BLD AUTO: 0.2 % (ref 0–0.5)
LDLC SERPL CALC-MCNC: 196.6 MG/DL (ref 63–159)
LYMPHOCYTES # BLD AUTO: 2.57 K/UL (ref 1–4.8)
MCH RBC QN AUTO: 29.7 PG (ref 27–31)
MCHC RBC AUTO-ENTMCNC: 31.4 G/DL (ref 32–36)
MCV RBC AUTO: 95 FL (ref 82–98)
NONHDLC SERPL-MCNC: 223 MG/DL
NUCLEATED RBC (/100WBC) (OHS): 0 /100 WBC
PLATELET # BLD AUTO: 332 K/UL (ref 150–450)
PMV BLD AUTO: 10.8 FL (ref 9.2–12.9)
POTASSIUM SERPL-SCNC: 4.3 MMOL/L (ref 3.5–5.1)
PROT SERPL-MCNC: 7.7 GM/DL (ref 6–8.4)
RBC # BLD AUTO: 4.11 M/UL (ref 4–5.4)
RELATIVE EOSINOPHIL (OHS): 0.5 %
RELATIVE LYMPHOCYTE (OHS): 42.1 % (ref 18–48)
RELATIVE MONOCYTE (OHS): 7.7 % (ref 4–15)
RELATIVE NEUTROPHIL (OHS): 49 % (ref 38–73)
SODIUM SERPL-SCNC: 142 MMOL/L (ref 136–145)
TRIGL SERPL-MCNC: 132 MG/DL (ref 30–150)
TSH SERPL-ACNC: 1.15 UIU/ML (ref 0.4–4)
WBC # BLD AUTO: 6.1 K/UL (ref 3.9–12.7)

## 2025-06-13 PROCEDURE — 99999 PR PBB SHADOW E&M-EST. PATIENT-LVL IV: CPT | Mod: PBBFAC,,, | Performed by: HOSPITALIST

## 2025-06-13 PROCEDURE — 83036 HEMOGLOBIN GLYCOSYLATED A1C: CPT

## 2025-06-13 PROCEDURE — 3044F HG A1C LEVEL LT 7.0%: CPT | Mod: CPTII,S$GLB,, | Performed by: HOSPITALIST

## 2025-06-13 PROCEDURE — 99396 PREV VISIT EST AGE 40-64: CPT | Mod: S$GLB,,, | Performed by: HOSPITALIST

## 2025-06-13 PROCEDURE — 3077F SYST BP >= 140 MM HG: CPT | Mod: CPTII,S$GLB,, | Performed by: HOSPITALIST

## 2025-06-13 PROCEDURE — 36415 COLL VENOUS BLD VENIPUNCTURE: CPT | Mod: PO

## 2025-06-13 PROCEDURE — 85025 COMPLETE CBC W/AUTO DIFF WBC: CPT

## 2025-06-13 PROCEDURE — 3008F BODY MASS INDEX DOCD: CPT | Mod: CPTII,S$GLB,, | Performed by: HOSPITALIST

## 2025-06-13 PROCEDURE — 3079F DIAST BP 80-89 MM HG: CPT | Mod: CPTII,S$GLB,, | Performed by: HOSPITALIST

## 2025-06-13 PROCEDURE — 84443 ASSAY THYROID STIM HORMONE: CPT

## 2025-06-13 PROCEDURE — 82040 ASSAY OF SERUM ALBUMIN: CPT

## 2025-06-13 PROCEDURE — 80061 LIPID PANEL: CPT

## 2025-06-13 RX ORDER — AMLODIPINE BESYLATE 5 MG/1
5 TABLET ORAL DAILY
Qty: 90 TABLET | Refills: 3 | Status: SHIPPED | OUTPATIENT
Start: 2025-06-13

## 2025-06-13 RX ORDER — ATORVASTATIN CALCIUM 20 MG/1
20 TABLET, FILM COATED ORAL DAILY
Qty: 90 TABLET | Refills: 3 | Status: SHIPPED | OUTPATIENT
Start: 2025-06-13

## 2025-06-13 RX ORDER — METHYLPREDNISOLONE 4 MG/1
TABLET ORAL
Qty: 21 TABLET | Refills: 0 | Status: SHIPPED | OUTPATIENT
Start: 2025-06-13 | End: 2025-07-04

## 2025-06-13 RX ORDER — METHOCARBAMOL 500 MG/1
TABLET, FILM COATED ORAL
Qty: 120 TABLET | Refills: 3 | Status: SHIPPED | OUTPATIENT
Start: 2025-06-13

## 2025-06-13 NOTE — PROGRESS NOTES
"Subjective:     @Patient ID: Geetha Meyers is a 44 y.o. female.    Chief Complaint: Annual Exam    HPI    43 y.o. female with hypertension, hyperlipidemia, prediabetes, rheumatoid arthritis, obesity, fibromyalgia presents for annual      Lipid disorders/ASCVD risk (ages >/= 45 or >/= 20 if increased risk ): ordered   MMG: due      Vaccines:   Influenza (yearly):   Tetanus (every 10 yrs - 1st tdap) utd 2021  Covid19:  Due      Review of Systems   Psychiatric/Behavioral:  Negative for agitation and confusion.      Past medical history, surgical history, and family medical history reviewed and updated as appropriate.    Medications and allergies reviewed.     Objective:     Vitals:    06/13/25 0901 06/13/25 0932   BP: (!) 142/82 (!) 142/98   Pulse: 93    Resp: 17    Temp: 97.8 °F (36.6 °C)    SpO2: 97%    Weight: 119.8 kg (264 lb 1.8 oz)    Height: 5' 3" (1.6 m)      Body mass index is 51.58 kg/m².  Physical Exam  Vitals reviewed.   Constitutional:       General: She is not in acute distress.     Appearance: She is well-developed.   HENT:      Head: Normocephalic and atraumatic.      Right Ear: Tympanic membrane normal.      Left Ear: Tympanic membrane normal.      Mouth/Throat:      Mouth: Mucous membranes are moist.      Pharynx: No oropharyngeal exudate.   Eyes:      General:         Right eye: No discharge.         Left eye: No discharge.      Conjunctiva/sclera: Conjunctivae normal.   Cardiovascular:      Rate and Rhythm: Normal rate and regular rhythm.      Heart sounds: No murmur heard.     No friction rub.   Pulmonary:      Effort: Pulmonary effort is normal.      Breath sounds: Normal breath sounds.   Abdominal:      General: Bowel sounds are normal. There is no distension.      Palpations: Abdomen is soft.      Tenderness: There is no abdominal tenderness. There is no guarding.   Musculoskeletal:         General: Normal range of motion.      Cervical back: Normal range of motion and neck supple.      Right " lower leg: No edema.      Left lower leg: No edema.   Lymphadenopathy:      Cervical: No cervical adenopathy.   Skin:     General: Skin is warm and dry.   Neurological:      Mental Status: She is alert and oriented to person, place, and time.   Psychiatric:         Mood and Affect: Mood normal.         Behavior: Behavior normal.         Lab Results   Component Value Date    WBC 6.83 10/09/2024    HGB 13.1 10/09/2024    HCT 40.5 10/09/2024     10/09/2024    CHOL 244 (H) 11/08/2023    TRIG 162 (H) 11/08/2023    HDL 43 11/08/2023    ALT 7 (L) 10/09/2024    AST 11 10/09/2024     10/09/2024    K 4.6 10/09/2024     10/09/2024    CREATININE 0.8 10/09/2024    BUN 12 10/09/2024    CO2 27 10/09/2024    TSH 0.951 10/09/2024    INR 1.0 02/29/2024    HGBA1C 6.0 (H) 11/08/2023       Assessment:     1. Annual physical exam    2. Primary hypertension    3. Hyperlipidemia, unspecified hyperlipidemia type    4. Prediabetes    5. Morbid obesity with BMI of 45.0-49.9, adult    6. Encounter for screening mammogram for breast cancer    7. Allergic sinusitis    8. Fibromyalgia    9. Arthralgia, unspecified joint      Plan:   Geetha was seen today for annual exam.    Diagnoses and all orders for this visit:    Annual physical exam  -     Comprehensive Metabolic Panel; Future  -     CBC Auto Differential; Future  -     Lipid Panel; Future  -     TSH; Future  -     Hemoglobin A1C; Future    Primary hypertension  -     Comprehensive Metabolic Panel; Future  -     CBC Auto Differential; Future  -     Lipid Panel; Future  -     TSH; Future  -     Hemoglobin A1C; Future    Hyperlipidemia, unspecified hyperlipidemia type  -     Comprehensive Metabolic Panel; Future  -     CBC Auto Differential; Future  -     Lipid Panel; Future  -     TSH; Future  -     Hemoglobin A1C; Future    Prediabetes  -     Comprehensive Metabolic Panel; Future  -     CBC Auto Differential; Future  -     Lipid Panel; Future  -     TSH; Future  -      Hemoglobin A1C; Future    Morbid obesity with BMI of 45.0-49.9, adult  -     Comprehensive Metabolic Panel; Future  -     CBC Auto Differential; Future  -     Lipid Panel; Future  -     TSH; Future  -     Hemoglobin A1C; Future    Encounter for screening mammogram for breast cancer  -     Mammo Digital Screening Bilat w/ Rakesh (XPD); Future    Allergic sinusitis  -     methylPREDNISolone (MEDROL DOSEPACK) 4 mg tablet; use as directed    Fibromyalgia  -     Ambulatory referral/consult to Rheumatology; Future    Arthralgia, unspecified joint  -     Ambulatory referral/consult to Rheumatology; Future    Other orders  -     amLODIPine (NORVASC) 5 MG tablet; Take 1 tablet (5 mg total) by mouth once daily.  -     atorvastatin (LIPITOR) 20 MG tablet; Take 1 tablet (20 mg total) by mouth once daily.  -     methocarbamoL (ROBAXIN) 500 MG Tab; Take 1 or 2 tablets up to 3 times daily as needed for muscle spasm. May cause sedation        Lisa Mike MD  Internal Medicine    6/13/2025

## 2025-06-23 ENCOUNTER — PATIENT MESSAGE (OUTPATIENT)
Dept: ADMINISTRATIVE | Facility: HOSPITAL | Age: 45
End: 2025-06-23
Payer: COMMERCIAL

## 2025-07-08 ENCOUNTER — PATIENT OUTREACH (OUTPATIENT)
Dept: ADMINISTRATIVE | Facility: HOSPITAL | Age: 45
End: 2025-07-08
Payer: COMMERCIAL

## 2025-07-08 VITALS — SYSTOLIC BLOOD PRESSURE: 111 MMHG | DIASTOLIC BLOOD PRESSURE: 74 MMHG

## 2025-07-14 ENCOUNTER — OFFICE VISIT (OUTPATIENT)
Dept: INTERNAL MEDICINE | Facility: CLINIC | Age: 45
End: 2025-07-14
Payer: COMMERCIAL

## 2025-07-14 VITALS
TEMPERATURE: 98 F | HEIGHT: 63 IN | WEIGHT: 259.25 LBS | RESPIRATION RATE: 18 BRPM | HEART RATE: 87 BPM | BODY MASS INDEX: 45.93 KG/M2 | OXYGEN SATURATION: 98 % | SYSTOLIC BLOOD PRESSURE: 122 MMHG | DIASTOLIC BLOOD PRESSURE: 76 MMHG

## 2025-07-14 DIAGNOSIS — M79.7 FIBROMYALGIA: ICD-10-CM

## 2025-07-14 DIAGNOSIS — E66.01 MORBID OBESITY WITH BMI OF 45.0-49.9, ADULT: ICD-10-CM

## 2025-07-14 DIAGNOSIS — I10 PRIMARY HYPERTENSION: Primary | ICD-10-CM

## 2025-07-14 DIAGNOSIS — R06.83 SNORING: ICD-10-CM

## 2025-07-14 DIAGNOSIS — M06.9 RHEUMATOID ARTHRITIS, INVOLVING UNSPECIFIED SITE, UNSPECIFIED WHETHER RHEUMATOID FACTOR PRESENT: ICD-10-CM

## 2025-07-14 PROCEDURE — 99213 OFFICE O/P EST LOW 20 MIN: CPT | Mod: S$GLB,,, | Performed by: NURSE PRACTITIONER

## 2025-07-14 PROCEDURE — 1159F MED LIST DOCD IN RCRD: CPT | Mod: CPTII,S$GLB,, | Performed by: NURSE PRACTITIONER

## 2025-07-14 PROCEDURE — 3044F HG A1C LEVEL LT 7.0%: CPT | Mod: CPTII,S$GLB,, | Performed by: NURSE PRACTITIONER

## 2025-07-14 PROCEDURE — 3074F SYST BP LT 130 MM HG: CPT | Mod: CPTII,S$GLB,, | Performed by: NURSE PRACTITIONER

## 2025-07-14 PROCEDURE — 3078F DIAST BP <80 MM HG: CPT | Mod: CPTII,S$GLB,, | Performed by: NURSE PRACTITIONER

## 2025-07-14 PROCEDURE — 1160F RVW MEDS BY RX/DR IN RCRD: CPT | Mod: CPTII,S$GLB,, | Performed by: NURSE PRACTITIONER

## 2025-07-14 PROCEDURE — 3008F BODY MASS INDEX DOCD: CPT | Mod: CPTII,S$GLB,, | Performed by: NURSE PRACTITIONER

## 2025-07-14 PROCEDURE — 99999 PR PBB SHADOW E&M-EST. PATIENT-LVL IV: CPT | Mod: PBBFAC,,, | Performed by: NURSE PRACTITIONER

## 2025-07-14 NOTE — Clinical Note
FYI on BP - much improved - not happy with ankle swelling. Advised to continue for now (try taking at night) and f/u in 3 months with you to reassess and consider possible alternatives.

## 2025-07-14 NOTE — Clinical Note
Good Afternoon,  Referral was sent back in January for sleep study/evaluation. She was scheduled but incorrectly and she was never rescheduled. Can we work on getting patient in for evaluation.  DZ

## 2025-07-15 NOTE — PROGRESS NOTES
Subjective:       Patient ID: Geetha Meyers is a 45 y.o. female.    Chief Complaint: Follow-up    History of Present Illness    CHIEF COMPLAINT:  Ms. Meyers presents today for follow up on blood pressure    HYPERTENSION:  She reports improvement in blood pressure after resuming medication, with readings previously in the 150s/140s now down to 118. Blood pressure was 142/82 at visit with Dr. MURPHY in June and 111/74 at GYN visit on July 1st. She notes current blood pressure may be slightly elevated due to experiencing pain. She is currently taking Amlodipine 5 mg daily.    She reports several medication-related side effects from Amlodipine including ankle swelling and increased fatigue, noting she has been sleeping more than usual. She experienced initial chest heaviness which has since resolved and describes a sensation of moving in slow motion. She feels more lethargic and tired compared to her state prior to restarting the medication.    MEDICAL HISTORY:  She has a history of rheumatoid arthritis, fibromyalgia, and obstructive sleep apnea. She reports extensive history of steroid use for multiple conditions including rheumatoid arthritis and fibromyalgia. Previous antihypertensive medications include Atenolol 25 mg daily in 2019, metoprolol, and hydrochlorothiazide.    MUSCULOSKELETAL:  She is currently experiencing significant joint pain in shoulder and hip, describing the shoulder pain as severe and affecting both shoulder and hip simultaneously. She has previously received arthritic injections.    WEIGHT MANAGEMENT:  She reports significant weight gain from approximately 150 lbs to over 300 lbs, which she attributes to medication side effects.      ROS:  Constitutional: +fatigue, +excessive drowsiness  Respiratory: -shortness of breath  Musculoskeletal: +joint pain, +limb swelling, +muscle pain, +pain with movement         Review of patient's allergies indicates:   Allergen Reactions    Amitriptyline hcl Diarrhea,  "Hives, Itching, Nausea Only, Rash, Shortness Of Breath and Swelling    Bactrim [sulfamethoxazole-trimethoprim]      Aseptic meningitis    Hydrobenzthiazide Diarrhea, Nausea Only, Palpitations, Photosensitivity, Rash and Swelling    Mold Diarrhea, Hives, Itching, Nausea And Vomiting, Rash, Shortness Of Breath and Swelling    Mushroom Anaphylaxis, Diarrhea, Edema, Hives, Itching, Nausea And Vomiting, Rash and Swelling    Sulfasalazine      Orange skin,nausea,diarrhea,h/a,ringing in the ears    Sulfa (sulfonamide antibiotics)     House dust Hives, Itching and Rash    Shea butter Hives, Itching and Rash    Wool Hives, Itching, Nausea Only, Rash and Swelling       Medication List with Changes/Refills   Current Medications    ACETAMINOPHEN (TYLENOL) 500 MG TABLET    Take 1 tablet (500 mg total) by mouth every 6 (six) hours as needed.    ALBUTEROL (VENTOLIN HFA) 90 MCG/ACTUATION INHALER    Inhale 2 puffs into the lungs every 6 (six) hours as needed for Wheezing. Rescue    AMLODIPINE (NORVASC) 5 MG TABLET    Take 1 tablet (5 mg total) by mouth once daily.    ATORVASTATIN (LIPITOR) 20 MG TABLET    Take 1 tablet (20 mg total) by mouth once daily.    METHOCARBAMOL (ROBAXIN) 500 MG TAB    Take 1 or 2 tablets up to 3 times daily as needed for muscle spasm. May cause sedation     Objective:   /76 (BP Location: Right arm, Patient Position: Sitting)   Pulse 87   Temp 98 °F (36.7 °C) (Temporal)   Resp 18   Ht 5' 3" (1.6 m)   Wt 117.6 kg (259 lb 4.2 oz)   SpO2 98%   BMI 45.93 kg/m²     Physical Exam  Vitals reviewed.   Constitutional:       Appearance: Normal appearance.   HENT:      Head: Normocephalic and atraumatic.   Pulmonary:      Effort: Pulmonary effort is normal.   Skin:     General: Skin is warm and dry.   Neurological:      Mental Status: She is alert and oriented to person, place, and time.       Assessment and Plan:     Assessment & Plan    Assessed BP control on amlodipine 5 mg daily, noting improvement " from 140s to 120s.  Evaluated appropriateness of current medication regimen, considering history with beta blockers and diuretics.  Continued amlodipine despite side effects, considering its effectiveness   Rheumatology to address other health issues.    1. Primary hypertension (Primary)    Chronic, improved.   Current blood pressure at 122/76 is considered optimal.  Continued amlodipine 5 mg daily.  - Discussed that some fatigue and feeling of slow motion may be due to body acclimating to lower BP.  - Advised taking medication at night to potentially reduce daytime ankle swelling.    2. Snoring    Chronic, needs repeat sleep study. Does not have CPAP at home.     3. Morbid obesity with BMI of 45.0-49.9, adult    Chronic, improved - down 5 lbs since June    4. Fibromyalgia  5. Rheumatoid arthritis, involving unspecified site, unspecified whether rheumatoid factor present    Chronic, stable, follow up with Rheumatology         FOLLOW-UP:  - Scheduled follow up in 3 months for reassessment of BP control, medication efficacy, and side effects.         This note was generated with the assistance of ambient listening technology. Verbal consent was obtained by the patient and accompanying visitor(s) for the recording of patient appointment to facilitate this note. I attest to having reviewed and edited the generated note for accuracy, though some syntax or spelling errors may persist. Please contact the author of this note for any clarification.

## 2025-07-28 ENCOUNTER — OFFICE VISIT (OUTPATIENT)
Dept: RHEUMATOLOGY | Facility: CLINIC | Age: 45
End: 2025-07-28
Payer: COMMERCIAL

## 2025-07-28 ENCOUNTER — HOSPITAL ENCOUNTER (OUTPATIENT)
Dept: RADIOLOGY | Facility: HOSPITAL | Age: 45
Discharge: HOME | End: 2025-07-28
Payer: COMMERCIAL

## 2025-07-28 VITALS
WEIGHT: 266.56 LBS | SYSTOLIC BLOOD PRESSURE: 128 MMHG | HEART RATE: 67 BPM | HEIGHT: 63 IN | DIASTOLIC BLOOD PRESSURE: 89 MMHG | BODY MASS INDEX: 47.23 KG/M2

## 2025-07-28 DIAGNOSIS — M79.7 FIBROMYALGIA: ICD-10-CM

## 2025-07-28 DIAGNOSIS — M25.50 ARTHRALGIA, UNSPECIFIED JOINT: ICD-10-CM

## 2025-07-28 DIAGNOSIS — D84.821 IMMUNOSUPPRESSION DUE TO DRUG THERAPY: ICD-10-CM

## 2025-07-28 DIAGNOSIS — Z79.899 IMMUNOSUPPRESSION DUE TO DRUG THERAPY: ICD-10-CM

## 2025-07-28 DIAGNOSIS — M06.00 SERONEGATIVE RHEUMATOID ARTHRITIS: ICD-10-CM

## 2025-07-28 DIAGNOSIS — M06.00 SERONEGATIVE RHEUMATOID ARTHRITIS: Primary | ICD-10-CM

## 2025-07-28 PROCEDURE — 3074F SYST BP LT 130 MM HG: CPT | Mod: CPTII,S$GLB,,

## 2025-07-28 PROCEDURE — 3079F DIAST BP 80-89 MM HG: CPT | Mod: CPTII,S$GLB,,

## 2025-07-28 PROCEDURE — 1160F RVW MEDS BY RX/DR IN RCRD: CPT | Mod: CPTII,S$GLB,,

## 2025-07-28 PROCEDURE — 3008F BODY MASS INDEX DOCD: CPT | Mod: CPTII,S$GLB,,

## 2025-07-28 PROCEDURE — 99214 OFFICE O/P EST MOD 30 MIN: CPT | Mod: 25,S$GLB,,

## 2025-07-28 PROCEDURE — 3044F HG A1C LEVEL LT 7.0%: CPT | Mod: CPTII,S$GLB,,

## 2025-07-28 PROCEDURE — 77077 JOINT SURVEY SINGLE VIEW: CPT | Mod: TC

## 2025-07-28 PROCEDURE — 96372 THER/PROPH/DIAG INJ SC/IM: CPT | Mod: S$GLB,,,

## 2025-07-28 PROCEDURE — 1159F MED LIST DOCD IN RCRD: CPT | Mod: CPTII,S$GLB,,

## 2025-07-28 PROCEDURE — 77077 JOINT SURVEY SINGLE VIEW: CPT | Mod: 26,,, | Performed by: RADIOLOGY

## 2025-07-28 PROCEDURE — 99999 PR PBB SHADOW E&M-EST. PATIENT-LVL V: CPT | Mod: PBBFAC,,,

## 2025-07-28 RX ORDER — TRIAMCINOLONE ACETONIDE 40 MG/ML
80 INJECTION, SUSPENSION INTRA-ARTICULAR; INTRAMUSCULAR
Status: COMPLETED | OUTPATIENT
Start: 2025-07-28 | End: 2025-07-28

## 2025-07-28 RX ORDER — INDOMETHACIN 75 MG/1
75 CAPSULE, EXTENDED RELEASE ORAL DAILY PRN
Qty: 30 CAPSULE | Refills: 2 | Status: SHIPPED | OUTPATIENT
Start: 2025-07-28

## 2025-07-28 RX ORDER — ETANERCEPT 50 MG/ML
50 SOLUTION SUBCUTANEOUS WEEKLY
Qty: 4 ML | Refills: 5 | Status: ACTIVE | OUTPATIENT
Start: 2025-07-28 | End: 2026-01-12

## 2025-07-28 RX ADMIN — TRIAMCINOLONE ACETONIDE 80 MG: 40 INJECTION, SUSPENSION INTRA-ARTICULAR; INTRAMUSCULAR at 11:07

## 2025-07-28 NOTE — PROGRESS NOTES
"Subjective:      Patient ID: Geetha Meyers is a 45 y.o. female.    Chief Complaint: Follow up on seronegative rheumatoid arthritis and fibromyalgia    HPI  Geetha Meyers is a 44yo female with a h/o HTN, HLD, and hysterectomy who presents for follow up on seronegative rheumatoid arthritis and fibromyalgia.  Previously followed at [facility] Dr. Etienne, then Dr. Hernandez.    -RF, -CCP  +RNP 1.7  Normal CK, aldolase  Elevated inflammation markers    Family h/o paternal grandmother with RA, paternal cousins with SLE, sister with psoriasis    Hysterectomy 2022 (left one ovary)  With 2 children (ages 18 and 25)    Previously:  Plaquenil  Sulfasalazine (sulfa allergy)  Methotrexate   Humira (helped, but she was getting frequent infections)  Cymbalta  Gabapentin  Amitryiptyline  Flexeril  Lyrica  Etolodac (GI issues)    Currently:  Robaxin (helps her sleep)    Today pt notes all over joint and muscle pain. AM joint pain and swelling, lasts a few hours.  She lost 60lbs in the past year. Joint pain has been worsening since she's lost weight.    Steroid pack temporarily helped her joint pain.    In the past she was seeing a private practice rheumatologist (who moved) and she notes feeling relief with Humira, however she was developing frequent infections.   She was having issues with insurance, so has been unable to follow up. Now dealing with the pain on her own, but it's become unbearable.     Review of Systems   Constitutional:  Positive for fatigue. Negative for fever.   Respiratory:  Negative for cough and shortness of breath.    Cardiovascular:  Negative for chest pain.   Musculoskeletal:  Positive for arthralgias, joint swelling and myalgias.   Psychiatric/Behavioral:  Negative for behavioral problems and confusion.         Objective:   /89   Pulse 67   Ht 5' 3" (1.6 m)   Wt 120.9 kg (266 lb 8.6 oz)   BMI 47.21 kg/m²   Physical Exam   Constitutional: She is oriented to person, place, and time. normal appearance. "   Cardiovascular: Normal rate and regular rhythm.   Pulmonary/Chest: Effort normal and breath sounds normal.   Musculoskeletal:         General: Tenderness present. No swelling.      Comments: Left hand  strength 4/5  Right hand  strength 5/5    Tender points of chest, BUE, BLE, neck, back   Neurological: She is alert and oriented to person, place, and time.   Skin: Skin is warm and dry.   Psychiatric: Her behavior is normal. Mood normal.        Assessment:     1. Seronegative rheumatoid arthritis    2. Fibromyalgia    3. Arthralgia, unspecified joint    4. Immunosuppression due to drug therapy          Plan:     Problem List Items Addressed This Visit       Fibromyalgia     Other Visit Diagnoses         Seronegative rheumatoid arthritis    -  Primary    Relevant Medications    triamcinolone acetonide injection 80 mg (Completed)    indomethacin (INDOCIN SR) 75 mg CpSR CR capsule    Other Relevant Orders    XR Arthritis Survey    Ambulatory referral/consult to Infectious Disease    CBC Auto Differential    Comprehensive Metabolic Panel    C-Reactive Protein    Sedimentation rate      Arthralgia, unspecified joint        Relevant Medications    indomethacin (INDOCIN SR) 75 mg CpSR CR capsule      Immunosuppression due to drug therapy        Relevant Orders    Ambulatory referral/consult to Infectious Disease    Hepatitis B surface antigen    HBcAB    Hepatitis B surface antibody    Hepatitis C antibody    Quantiferon Gold TB        Geetha Meyers with uncontrolled fibromyalgia, but she also reports AM joint pain, swelling and stiffness. No synovitis was noted on exam today. However since she states feeling relief on Humira in the past, I will start a trial on Enbrel 50mg weekly, for seronegative arthritis.  Elevated CDAI today, with high disease activity.  Start indomethacin 75mg daily PRN for pain.  Kenalog 80mg IM now for flare.  Check 4 labs +DMARD labs now.  Refer now to Infectious Disease for biologic  clearance.    Next visit, if she continues to c/o muscle tenderness, will treat fibromyalgia.    RTO 3mos

## 2025-07-31 ENCOUNTER — HOSPITAL ENCOUNTER (OUTPATIENT)
Dept: RADIOLOGY | Facility: HOSPITAL | Age: 45
Discharge: HOME | End: 2025-07-31
Attending: HOSPITALIST
Payer: COMMERCIAL

## 2025-07-31 VITALS — BODY MASS INDEX: 47.13 KG/M2 | WEIGHT: 266 LBS | HEIGHT: 63 IN

## 2025-07-31 DIAGNOSIS — Z12.31 ENCOUNTER FOR SCREENING MAMMOGRAM FOR BREAST CANCER: ICD-10-CM

## 2025-07-31 PROCEDURE — 77067 SCR MAMMO BI INCL CAD: CPT | Mod: TC

## 2025-08-11 ENCOUNTER — RESULTS FOLLOW-UP (OUTPATIENT)
Dept: INTERNAL MEDICINE | Facility: CLINIC | Age: 45
End: 2025-08-11
Payer: COMMERCIAL